# Patient Record
Sex: FEMALE | Race: WHITE | Employment: OTHER | URBAN - METROPOLITAN AREA
[De-identification: names, ages, dates, MRNs, and addresses within clinical notes are randomized per-mention and may not be internally consistent; named-entity substitution may affect disease eponyms.]

---

## 2017-01-04 ENCOUNTER — TELEPHONE (OUTPATIENT)
Dept: INTERNAL MEDICINE CLINIC | Age: 62
End: 2017-01-04

## 2017-01-04 DIAGNOSIS — E78.5 DYSLIPIDEMIA: Primary | ICD-10-CM

## 2017-01-04 DIAGNOSIS — E03.9 HYPOTHYROIDISM, UNSPECIFIED TYPE: ICD-10-CM

## 2017-01-05 NOTE — TELEPHONE ENCOUNTER
Per kenyatta kumar np labs reordered due to being  I have attempted to contact this patient by phone with the following results: left message to return my call on answering machine.

## 2017-02-12 DIAGNOSIS — J30.9 ALLERGIC RHINITIS: ICD-10-CM

## 2017-02-13 RX ORDER — FLUTICASONE PROPIONATE 50 MCG
SPRAY, SUSPENSION (ML) NASAL
Qty: 1 BOTTLE | Refills: 1 | Status: SHIPPED | OUTPATIENT
Start: 2017-02-13 | End: 2017-09-19 | Stop reason: SDUPTHER

## 2017-02-13 NOTE — TELEPHONE ENCOUNTER
2/13/2017  9:25 AM    Chief Complaint   Patient presents with    Medication Refill       Noted refill request for Flonase. Patient of Sharona Steward NP- forwarded at this time.

## 2017-02-17 ENCOUNTER — HOSPITAL ENCOUNTER (OUTPATIENT)
Dept: LAB | Age: 62
Discharge: HOME | End: 2017-02-17
Payer: COMMERCIAL

## 2017-02-17 DIAGNOSIS — E03.9 HYPOTHYROIDISM, UNSPECIFIED TYPE: ICD-10-CM

## 2017-02-17 DIAGNOSIS — E78.5 DYSLIPIDEMIA: ICD-10-CM

## 2017-02-17 LAB
ALBUMIN SERPL BCP-MCNC: 4 G/DL (ref 3.4–5)
ALBUMIN/GLOB SERPL: 1.3 {RATIO} (ref 0.8–1.7)
ALP SERPL-CCNC: 67 U/L (ref 45–117)
ALT SERPL-CCNC: 18 U/L (ref 13–56)
ANION GAP BLD CALC-SCNC: 5 MMOL/L (ref 3–18)
AST SERPL W P-5'-P-CCNC: 6 U/L (ref 15–37)
BASOPHILS # BLD AUTO: 0.1 K/UL (ref 0–0.1)
BASOPHILS # BLD: 1 % (ref 0–2)
BILIRUB SERPL-MCNC: 0.4 MG/DL (ref 0.2–1)
BUN SERPL-MCNC: 15 MG/DL (ref 7–18)
BUN/CREAT SERPL: 21 (ref 12–20)
CALCIUM SERPL-MCNC: 9.5 MG/DL (ref 8.5–10.1)
CHLORIDE SERPL-SCNC: 107 MMOL/L (ref 100–108)
CHOLEST SERPL-MCNC: 287 MG/DL
CO2 SERPL-SCNC: 30 MMOL/L (ref 21–32)
CREAT SERPL-MCNC: 0.71 MG/DL (ref 0.6–1.3)
DIFFERENTIAL METHOD BLD: NORMAL
EOSINOPHIL # BLD: 0.1 K/UL (ref 0–0.4)
EOSINOPHIL NFR BLD: 2 % (ref 0–5)
ERYTHROCYTE [DISTWIDTH] IN BLOOD BY AUTOMATED COUNT: 12.7 % (ref 11.6–14.5)
GLOBULIN SER CALC-MCNC: 3 G/DL (ref 2–4)
GLUCOSE SERPL-MCNC: 98 MG/DL (ref 74–99)
HCT VFR BLD AUTO: 44.7 % (ref 35–45)
HDLC SERPL-MCNC: 54 MG/DL (ref 40–60)
HDLC SERPL: 5.3 {RATIO} (ref 0–5)
HGB BLD-MCNC: 14.9 G/DL (ref 12–16)
LDLC SERPL CALC-MCNC: 197 MG/DL (ref 0–100)
LIPID PROFILE,FLP: ABNORMAL
LYMPHOCYTES # BLD AUTO: 36 % (ref 21–52)
LYMPHOCYTES # BLD: 2.5 K/UL (ref 0.9–3.6)
MCH RBC QN AUTO: 32.2 PG (ref 24–34)
MCHC RBC AUTO-ENTMCNC: 33.3 G/DL (ref 31–37)
MCV RBC AUTO: 96.5 FL (ref 74–97)
MONOCYTES # BLD: 0.4 K/UL (ref 0.05–1.2)
MONOCYTES NFR BLD AUTO: 6 % (ref 3–10)
NEUTS SEG # BLD: 3.7 K/UL (ref 1.8–8)
NEUTS SEG NFR BLD AUTO: 55 % (ref 40–73)
PLATELET # BLD AUTO: 257 K/UL (ref 135–420)
PMV BLD AUTO: 9.2 FL (ref 9.2–11.8)
POTASSIUM SERPL-SCNC: 4.1 MMOL/L (ref 3.5–5.5)
PROT SERPL-MCNC: 7 G/DL (ref 6.4–8.2)
RBC # BLD AUTO: 4.63 M/UL (ref 4.2–5.3)
SODIUM SERPL-SCNC: 142 MMOL/L (ref 136–145)
TRIGL SERPL-MCNC: 180 MG/DL (ref ?–150)
TSH SERPL DL<=0.05 MIU/L-ACNC: 25.2 UIU/ML (ref 0.36–3.74)
VLDLC SERPL CALC-MCNC: 36 MG/DL
WBC # BLD AUTO: 6.8 K/UL (ref 4.6–13.2)

## 2017-02-17 PROCEDURE — 84443 ASSAY THYROID STIM HORMONE: CPT | Performed by: NURSE PRACTITIONER

## 2017-02-17 PROCEDURE — 36415 COLL VENOUS BLD VENIPUNCTURE: CPT | Performed by: NURSE PRACTITIONER

## 2017-02-17 PROCEDURE — 80053 COMPREHEN METABOLIC PANEL: CPT | Performed by: NURSE PRACTITIONER

## 2017-02-17 PROCEDURE — 80061 LIPID PANEL: CPT | Performed by: NURSE PRACTITIONER

## 2017-02-17 PROCEDURE — 85025 COMPLETE CBC W/AUTO DIFF WBC: CPT | Performed by: NURSE PRACTITIONER

## 2017-02-20 DIAGNOSIS — E03.9 HYPOTHYROIDISM, UNSPECIFIED TYPE: Primary | ICD-10-CM

## 2017-02-20 DIAGNOSIS — E78.5 DYSLIPIDEMIA: ICD-10-CM

## 2017-02-20 RX ORDER — ATORVASTATIN CALCIUM 40 MG/1
40 TABLET, FILM COATED ORAL DAILY
Qty: 30 TAB | Refills: 1 | Status: SHIPPED | OUTPATIENT
Start: 2017-02-20 | End: 2017-04-17 | Stop reason: SDUPTHER

## 2017-02-21 ENCOUNTER — HOSPITAL ENCOUNTER (OUTPATIENT)
Dept: LAB | Age: 62
Discharge: HOME | End: 2017-02-21
Payer: COMMERCIAL

## 2017-02-21 DIAGNOSIS — E03.9 HYPOTHYROIDISM, UNSPECIFIED TYPE: ICD-10-CM

## 2017-02-21 DIAGNOSIS — E03.9 HYPOTHYROIDISM, UNSPECIFIED TYPE: Primary | ICD-10-CM

## 2017-02-21 LAB — TSH SERPL DL<=0.05 MIU/L-ACNC: 11.5 UIU/ML (ref 0.36–3.74)

## 2017-02-21 PROCEDURE — 84443 ASSAY THYROID STIM HORMONE: CPT | Performed by: NURSE PRACTITIONER

## 2017-02-21 PROCEDURE — 36415 COLL VENOUS BLD VENIPUNCTURE: CPT | Performed by: NURSE PRACTITIONER

## 2017-02-21 RX ORDER — LEVOTHYROXINE SODIUM 88 UG/1
88 TABLET ORAL
Qty: 30 TAB | Refills: 1 | Status: SHIPPED | OUTPATIENT
Start: 2017-02-21 | End: 2017-09-19 | Stop reason: DRUGHIGH

## 2017-03-22 ENCOUNTER — OFFICE VISIT (OUTPATIENT)
Dept: FAMILY MEDICINE CLINIC | Age: 62
End: 2017-03-22

## 2017-03-22 VITALS
HEIGHT: 65 IN | SYSTOLIC BLOOD PRESSURE: 110 MMHG | OXYGEN SATURATION: 96 % | WEIGHT: 198 LBS | BODY MASS INDEX: 32.99 KG/M2 | TEMPERATURE: 96.9 F | HEART RATE: 79 BPM | DIASTOLIC BLOOD PRESSURE: 53 MMHG | RESPIRATION RATE: 18 BRPM

## 2017-03-22 DIAGNOSIS — B34.9 VIRAL SYNDROME: Primary | ICD-10-CM

## 2017-03-22 NOTE — LETTER
NOTIFICATION RETURN TO WORK / SCHOOL 
 
3/22/2017 1:45 PM 
 
Ms. Brianna Marie 
[address] [facility] [address] To Whom It May Concern: 
 
Brianna Marie is currently under the care of [facility]. She will return to work/school on: 3/24/17 If there are questions or concerns please have the patient contact our office.  
 
 
 
Sincerely, 
 
 
MESSI Mahoney

## 2017-03-22 NOTE — PROGRESS NOTES
Patient: Pollo Gunderson MRN: [id number]  SSN: [id number]    YOB: 1955  Age: 65 y.o. Sex: female      Date of Service: 3/22/2017   Provider: MESSI Vang   Office Location:   [address] [facility], [address] [place] [address], [address]  Office Phone: [phone]  Office Fax: [phone]        REASON FOR VISIT:   Chief Complaint   Patient presents with    Sore Throat     pt states \" that the sore throat started yesterday pt states \" that pt throat feels like it is burning \"     Vomiting     pt states \" that pt had vomiting once today        VITALS:   Visit Vitals    /53    Pulse 79    Temp 96.9 °F (36.1 °C) (Oral)    Resp 18    Ht 5' 5\" (1.651 m)    Wt 198 lb (89.8 kg)    SpO2 96%    BMI 32.95 kg/m2       MEDICATIONS:   Current Outpatient Prescriptions   Medication Sig Dispense Refill    levothyroxine (SYNTHROID) 88 mcg tablet Take 1 Tab by mouth Daily (before breakfast). 30 Tab 1    atorvastatin (LIPITOR) 40 mg tablet Take 1 Tab by mouth daily. Indications: mixed hyperlipidemia 30 Tab 1    fluticasone (FLONASE) 50 mcg/actuation nasal spray USE 2 SPRAYS INTO EACH NOSTRIL EVERY DAY 1 Bottle 1    fluticasone (FLONASE) 50 mcg/actuation nasal spray 2 Sprays by Both Nostrils route daily. 1 Bottle 3    ergocalciferol (ERGOCALCIFEROL) 50,000 unit capsule Take 50,000 Units by mouth.  albuterol (PROVENTIL HFA, VENTOLIN HFA, PROAIR HFA) 90 mcg/actuation inhaler Take 2 Puffs by inhalation every four (4) hours as needed for Wheezing. 1 Inhaler 2    acetaminophen (TYLENOL) 325 mg tablet Take 500 mg by mouth every four (4) hours as needed for Pain.  valACYclovir (VALTREX) 1 gram tablet Take 1 Tab by mouth two (2) times daily as needed.  azithromycin (ZITHROMAX) 250 mg tablet Take 2 tablets today, then take 1 tablet daily 6 Tab 0    Cetirizine (ZYRTEC) 10 mg cap Take  by mouth daily.       guaiFENesin-codeine (ROBITUSSIN AC) 100-10 mg/5 mL solution Take 5 mL by mouth three (3) times daily as needed for Cough. Max Daily Amount: 15 mL. Do not drive if taking this medication 240 mL 0    montelukast (SINGULAIR) 10 mg tablet TAKE 1 TABLET BY MOUTH DAILY 30 Tab 0    ibuprofen (MOTRIN) 800 mg tablet Take 1 Tab by mouth every six (6) hours as needed for Pain. 30 Tab 1        ALLERGIES:   Allergies   Allergen Reactions    Sulfa (Sulfonamide Antibiotics) Hives        ACTIVE MEDICAL PROBLEMS:  Patient Active Problem List   Diagnosis Code    Chest pain R07.9    Dyslipidemia E78.5    Tobacco use disorder F17.200    Hypothyroidism E03.9    Asthma J45.909    Thyroid disease E07.9    Restless leg syndrome G25.81    Endometriosis N80.9    Sigmoid diverticulitis K57.32        MEDICAL/SURGICAL HISTORY:  Past Medical History:   Diagnosis Date    Asthma     childhood diagnosis; occasional inhaler use    Chest pain     Diverticulitis     Dyslipidemia     \"borderline\" per pt.  Endometriosis     GERD (gastroesophageal reflux disease)     Hypothyroidism     Normal nuclear stress test 09/14/2011    No ischemia or infarct. EF 75%. No WMA. Neg max EST w/nondiagnostic EKG changes and atypical chest discomfort at peak exercise.     Restless leg syndrome     Thyroid disease     Tobacco use disorder     ongoing      Past Surgical History:   Procedure Laterality Date    HX HYSTERECTOMY      HX HYSTEROSCOPY      HX OVARIAN CYST REMOVAL      teenager    HX TUBAL LIGATION      30 years ago        FAMILY HISTORY:  Family History   Problem Relation Age of Onset    Lung Disease Mother     Cancer Father     Cancer Sister 52     breast cancer    Asthma Sister     Asthma Brother         SOCIAL HISTORY:  Social History   Substance Use Topics    Smoking status: Current Every Day Smoker     Packs/day: 0.25     Years: 25.00    Smokeless tobacco: Never Used    Alcohol use Yes            HISTORY OF PRESENT ILLNESS:   Brianna Marie is a 65 y.o. female who presents to the office complaining of sore throat x 2 days, and vomiting x 1 day. Patient reports sudden onset fatigue and malaise yesterday, which worsened throughout the course of the day. She also developed progressively worsening sore throat, described as \"burning. \" Today, these symptoms have persisted, and she has also noted some nasal congestion and post-nasal drip. Had one episode of nausea followed by vomiting small amount of mucus earlier today while at work. She has been exposed to numerous co-workers who have been sick with similar symptoms. Patient did have a flu shot earlier this season. REVIEW OF SYSTEMS:  Review of Systems   Constitutional: Positive for chills and malaise/fatigue. Negative for fever. HENT: Positive for congestion and sore throat. Eyes: Positive for redness ( right eye). Negative for discharge. Respiratory: Positive for cough. Negative for sputum production, shortness of breath and wheezing. Cardiovascular: Negative for chest pain and palpitations. Gastrointestinal: Positive for nausea and vomiting. Negative for abdominal pain and diarrhea. Neurological: Negative for headaches. PHYSICAL EXAMINATION:  Physical Exam   Constitutional: She is well-developed, well-nourished, and in no distress. HENT:   Head: Normocephalic and atraumatic. Right Ear: External ear normal.   Left Ear: External ear normal.   Nose: Mucosal edema and rhinorrhea present. Mouth/Throat: Uvula is midline. Posterior oropharyngeal erythema present. No oropharyngeal exudate or posterior oropharyngeal edema. Eyes: Conjunctivae are normal.   Neck: Neck supple. Cardiovascular: Normal rate, regular rhythm and normal heart sounds. Exam reveals no gallop and no friction rub. No murmur heard. Pulmonary/Chest: Effort normal and breath sounds normal. She has no wheezes. She has no rales. Abdominal: Soft. Bowel sounds are normal. There is no tenderness. There is no rebound and no guarding. Lymphadenopathy:     She has cervical adenopathy. RESULTS:  No results found for this visit on 03/22/17. ASSESSMENT/PLAN:  Bharati Stout was seen today for sore throat and vomiting. Diagnoses and all orders for this visit:    Viral syndrome  - Suspect viral illness  - Counseled on supportive care with fluids, rest, OTC meds as needed  - Out of work note provided    Follow up PRN new/worsening symptoms    Patient expresses understanding and is agreeable with the above plan.       PATIENT CARE TEAM:   Patient Care Team:  Roby Vieira NP as PCP - General (Nurse Practitioner)  Mg Bonilla RN as Nurse [place], [place]   March 22, 2017    3:29 PM

## 2017-04-03 ENCOUNTER — OFFICE VISIT (OUTPATIENT)
Dept: ORTHOPEDIC SURGERY | Age: 62
End: 2017-04-03

## 2017-04-03 VITALS
TEMPERATURE: 95.5 F | DIASTOLIC BLOOD PRESSURE: 71 MMHG | SYSTOLIC BLOOD PRESSURE: 135 MMHG | HEART RATE: 76 BPM | BODY MASS INDEX: 32.96 KG/M2 | HEIGHT: 65 IN | WEIGHT: 197.8 LBS

## 2017-04-03 DIAGNOSIS — M79.672 BILATERAL FOOT PAIN: ICD-10-CM

## 2017-04-03 DIAGNOSIS — L60.0 INGROWN TOENAIL: Primary | ICD-10-CM

## 2017-04-03 DIAGNOSIS — M79.671 BILATERAL FOOT PAIN: ICD-10-CM

## 2017-04-03 NOTE — PROGRESS NOTES
AMBULATORY PROGRESS NOTE      Patient: Tiffanie Rivero             MRN: [id number]     SSN: [id number] Body mass index is 32.92 kg/(m^2). YOB: 1955     AGE: 65 y.o. EX: female    PCP: Vannessa Trivedi NP    IMPRESSION/DIAGNOSIS AND TREATMENT PLAN     DIAGNOSES  1. Ingrown toenail    2. Bilateral foot pain        Orders Placed This Encounter    [97576] Foot Min 3V    [79813] Foot Min 3V      Brianna Marie understands her diagnoses and the proposed plan. My plan for her is going to be a right great toenail partial excision, medial and lateral aspects of her right great toe:  Nail plate removal, partial, medial and lateral aspects with nail matrix excision. The goal of surgery is to prevent the nail plate from regrowing on the corners of the right great toe. Time of Surgery:  I anticipate 45 minutes. Lana 1 outpatient. She will call Corinne Burns to get things scheduled. Plan:    1) Medical release forms from prior procedure  2) Referral to Corinne Burns for surgical scheduling    RTO - Pre-op appointment    HPI AND EXAMINATION     Brianna Marie IS A 65 y.o. female who presents to my outpatient office complaining of: bilateral foot and great toe pain. Patient states that her pain is localized on the lateral aspect of her right great toe. Specifically the lateral nail fold. Patient notes a previous procedure where her right great toe ingrown toenail was \"dug out\". She associates her current problems with that procedure. The Zadik procedure was discussed with the patient. She would like to move forward with the procedure bilaterally. 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 Brianna Marie is alert/oriented (name, location, time) and follows commands well. she  is in no acute distress and her affect and mood are appropriate.       Bilateral ANKLE and FOOT       Gait: normal  Tenderness: moderate tenderness to the lateral aspect of the right first toe  Cutaneous: No rashes, skin patches, wounds, or abrasions to the lower legs           Warm and Normal color. No regions of expressible drainage. Medial Border of Tibia Region: absent           Skin color, texture, turgor normal. Normal.    Right foot: Hypertrophic first toe nail plate with fungal infection. Joint Motion: ROM Ankle: Normal , Hindfoot: (ST,TN,CC) Normal, Forefoot toes: Normal  Neurologic Exam: Neuro: Motor: normal 5/5 strength in all tested muscle groups and Sensory: no sensory deficits noted. Contractures: Gastrocnemius or Achilles Contractures absent  Joint / Tendon Stability: No Ankle or Subtalar instability or joint laxity. No peroneal sublux ability or dislocation  Alignment:  Normal Foot Alignment and  Flexible  Vascular: Normal Pulses/ NL Capillary refill, No evidence of DVT seen on physical exam.   No calf swelling, no tenderness to calf muscles. Lymphatic:  No Evidence of Lymphedema. CHART REVIEW     Past Medical History:   Diagnosis Date    Asthma     childhood diagnosis; occasional inhaler use    Chest pain     Diverticulitis     Dyslipidemia     \"borderline\" per pt.  Endometriosis     GERD (gastroesophageal reflux disease)     Hypothyroidism     Normal nuclear stress test 09/14/2011    No ischemia or infarct. EF 75%. No WMA. Neg max EST w/nondiagnostic EKG changes and atypical chest discomfort at peak exercise.  Restless leg syndrome     Thyroid disease     Tobacco use disorder     ongoing     Current Outpatient Prescriptions   Medication Sig    levothyroxine (SYNTHROID) 88 mcg tablet Take 1 Tab by mouth Daily (before breakfast).  atorvastatin (LIPITOR) 40 mg tablet Take 1 Tab by mouth daily. Indications: mixed hyperlipidemia    fluticasone (FLONASE) 50 mcg/actuation nasal spray 2 Sprays by Both Nostrils route daily.  Cetirizine (ZYRTEC) 10 mg cap Take  by mouth daily.     ergocalciferol (ERGOCALCIFEROL) 50,000 unit capsule Take 50,000 Units by mouth.  montelukast (SINGULAIR) 10 mg tablet TAKE 1 TABLET BY MOUTH DAILY    acetaminophen (TYLENOL) 325 mg tablet Take 500 mg by mouth every four (4) hours as needed for Pain.  ibuprofen (MOTRIN) 800 mg tablet Take 1 Tab by mouth every six (6) hours as needed for Pain.  fluticasone (FLONASE) 50 mcg/actuation nasal spray USE 2 SPRAYS INTO EACH NOSTRIL EVERY DAY    valACYclovir (VALTREX) 1 gram tablet Take 1 Tab by mouth two (2) times daily as needed.  azithromycin (ZITHROMAX) 250 mg tablet Take 2 tablets today, then take 1 tablet daily    guaiFENesin-codeine (ROBITUSSIN AC) 100-10 mg/5 mL solution Take 5 mL by mouth three (3) times daily as needed for Cough. Max Daily Amount: 15 mL. Do not drive if taking this medication    albuterol (PROVENTIL HFA, VENTOLIN HFA, PROAIR HFA) 90 mcg/actuation inhaler Take 2 Puffs by inhalation every four (4) hours as needed for Wheezing. No current facility-administered medications for this visit. Allergies   Allergen Reactions    Sulfa (Sulfonamide Antibiotics) Hives     Past Surgical History:   Procedure Laterality Date    HX HYSTERECTOMY      HX HYSTEROSCOPY      HX OVARIAN CYST REMOVAL      teenager    HX TUBAL LIGATION      30 years ago     Social History     Occupational History    Not on file. Social History Main Topics    Smoking status: Current Every Day Smoker     Packs/day: 0.25     Years: 25.00    Smokeless tobacco: Never Used    Alcohol use Yes    Drug use: No    Sexual activity: Yes     Partners: Male     Birth control/ protection: None     Family History   Problem Relation Age of Onset    Lung Disease Mother     Cancer Father     Cancer Sister 52     breast cancer    Asthma Sister     Asthma Brother        REVIEW OF SYSTEMS : 4/3/2017  ALL BELOW ARE Negative except : SEE HPI       Constitutional: Negative for fever, chills and weight loss.  Neg Weigh Loss  Cardiovascular: Negative for chest pain, claudication and leg swelling. SOB, TSAI   Gastrointestinal: Negative for  pain, N/V/D/C, Blood in stool or urine,dysuria, hematuria,        Incontinence, pelvic pain  Musculoskeletal: see HPI. Neurological: Negative for dizziness and weakness. Negative for headaches,Visual Changes, Confusion, Seizures,   Psychiatric/Behavioral: Negative for depression, memory loss and substance abuse. Extremities:  Negative for  hair changes, rash or skin lesion changes. Hematologic: Negative for Bleeding problems, bruising, pallor or swollen lymph nodes. Peripheral Vascular: No calf pain, vascular vein tenderness to calf pain              No calf throbbing, posterior knee throbbing pain    DIAGNOSTIC IMAGING     FOOT X RAYS 3 VIEWS Bilateral   4/3/2017    NON WEIGHTBEARING X-RAYS     X RAYS AT [address] [place] [address]  4/3/2017      Bones: No fractures or dislocations. No focal osteolytic or osteoblastic process     Bone Spurs: No significant bone spurs  Alignment foot: WNL   Joint Condition: No Significant OA  Soft Tissues: Normal, No radiopaque foreign body and No abnormal calcific densities to soft tissues   No ankle joint effusion in lateral projection. Mineralization: Suggests  no Osteopenia    I have personally reviewed the results of the above study. The interpretation of this study is my professional opinion    Written by Kallie Rivas, as dictated by Mary Anderson MD. I, DrLeticia, Mary Anderson MD, confirm that all documentation is accurate.

## 2017-04-03 NOTE — MR AVS SNAPSHOT
Visit Information Date & Time Provider Department Dept. Phone Encounter #  
 4/3/2017  8:30 AM Pete Ramsayr, [address] [facility] [address] [facility] [phone] [id number] Upcoming Health Maintenance Date Due Hepatitis C Screening 1955 Pneumococcal 19-64 Medium Risk (1 of 1 - PPSV23) 2/5/1975 DTaP/Tdap/Td series (1 - Tdap) 2/5/1977 PAP AKA CERVICAL CYTOLOGY 2/5/1977 ZOSTER VACCINE AGE 60> 2/5/2016 BREAST CANCER SCRN MAMMOGRAM 12/28/2017 COLONOSCOPY 11/28/2021 Allergies as of 4/3/2017  Review Complete On: 4/3/2017 By: Bridgett Alston Severity Noted Reaction Type Reactions Sulfa (Sulfonamide Antibiotics)  09/21/2011    Hives Current Immunizations  Reviewed on 10/31/2016 Name Date Influenza Vaccine 9/1/2016 Not reviewed this visit You Were Diagnosed With   
  
 Codes Comments Bilateral foot pain    -  Primary ICD-10-CM: M79.671, S70.798 ICD-9-CM: 729.5 Vitals BP Pulse Temp Height(growth percentile) Weight(growth percentile) BMI  
 135/71 76 95.5 °F (35.3 °C) (Oral) 5' 5\" (1.651 m) 197 lb 12.8 oz (89.7 kg) 32.92 kg/m2 OB Status Smoking Status Hysterectomy Current Every Day Smoker BMI and BSA Data Body Mass Index Body Surface Area  
 32.92 kg/m 2 2.03 m 2 Preferred Pharmacy Pharmacy Name Phone [address], [address] [place] [address] [facility] [phone] Your Updated Medication List  
  
   
This list is accurate as of: 4/3/17  9:20 AM.  Always use your most recent med list.  
  
  
  
  
 albuterol 90 mcg/actuation inhaler Commonly known as:  PROVENTIL HFA, VENTOLIN HFA, PROAIR HFA Take 2 Puffs by inhalation every four (4) hours as needed for Wheezing. atorvastatin 40 mg tablet Commonly known as:  LIPITOR Take 1 Tab by mouth daily. Indications: mixed hyperlipidemia  
  
 azithromycin 250 mg tablet Commonly known as:  Nola Reina Take 2 tablets today, then take 1 tablet daily  
  
 ergocalciferol 50,000 unit capsule Commonly known as:  ERGOCALCIFEROL Take 50,000 Units by mouth. * fluticasone 50 mcg/actuation nasal spray Commonly known as:  Jose Noss 2 Sprays by Both Nostrils route daily. * fluticasone 50 mcg/actuation nasal spray Commonly known as:  FLONASE  
USE 2 SPRAYS INTO EACH NOSTRIL EVERY DAY  
  
 guaiFENesin-codeine 100-10 mg/5 mL solution Commonly known as:  ROBITUSSIN AC Take 5 mL by mouth three (3) times daily as needed for Cough. Max Daily Amount: 15 mL. Do not drive if taking this medication  
  
 ibuprofen 800 mg tablet Commonly known as:  MOTRIN Take 1 Tab by mouth every six (6) hours as needed for Pain.  
  
 levothyroxine 88 mcg tablet Commonly known as:  SYNTHROID Take 1 Tab by mouth Daily (before breakfast). montelukast 10 mg tablet Commonly known as:  SINGULAIR  
TAKE 1 TABLET BY MOUTH DAILY  
  
 TYLENOL 325 mg tablet Generic drug:  acetaminophen Take 500 mg by mouth every four (4) hours as needed for Pain. valACYclovir 1 gram tablet Commonly known as:  VALTREX Take 1 Tab by mouth two (2) times daily as needed. ZyrTEC 10 mg Cap Generic drug:  Cetirizine Take  by mouth daily. * Notice: This list has 2 medication(s) that are the same as other medications prescribed for you. Read the directions carefully, and ask your doctor or other care provider to review them with you. We Performed the Following AMB POC XRAY, FOOT; COMPLETE, 3+ VIEW [28659 CPT(R)] AMB POC XRAY, FOOT; COMPLETE, 3+ VIEW [81721 CPT(R)] Patient Instructions Please follow up with Otoniel Caruso for surgical scheduling. You are advised to contact us if your condition worsens. Ingrown Toenail: Care Instructions Your Care Instructions An ingrown toenail often occurs because a nail is not trimmed correctly or because shoes are too tight. An ingrown nail can cause an infection. If your toe is infected, your doctor may prescribe antibiotics. Most ingrown toenails can be treated at home. You should trim toenails straight across, so the ends of the nail grow over the skin and not into it. Good nail care can prevent ingrown toenails. Follow-up care is a key part of your treatment and safety. Be sure to make and go to all appointments, and call your doctor if you are having problems. It's also a good idea to know your test results and keep a list of the medicines you take. How can you care for yourself at home? · Trim the nails straight across. Leave the corners a little longer so they do not cut into the skin. To do this when you have an ingrown nail: 
¨ Soak your foot in warm water for about 15 minutes to soften the nail. ¨ Wedge a small piece of wet cotton under the corner of the nail to cushion the nail and lift it slightly. This keeps it from cutting the skin. ¨ Repeat daily until the nail has grown out and can be trimmed. · Do not use manicure scissors to dig under the ingrown nail. You might stab your toe, which could get infected. · Do not trim your toenails too short. · Check with your doctor before trimming your own toenails if you have been diagnosed with diabetes or peripheral arterial disease. These conditions increase the risk of an infection, because you may have decreased sensation in your toes and cut yourself without knowing it. · Wear roomy, comfortable shoes. · If your doctor prescribed antibiotics, take them as directed. Do not stop taking them just because you feel better. You need to take the full course of antibiotics. When should you call for help? Call your doctor now or seek immediate medical care if: 
· You have signs of infection, such as: 
¨ Increased pain, swelling, warmth, or redness. ¨ Red streaks leading from the toe. ¨ Pus draining from the toe. ¨ A fever. Watch closely for changes in your health, and be sure to contact your doctor if: 
· You have problems trimming your nails. · You do not get better as expected. Where can you learn more? Go to http://michael-jessi.info/. Enter R135 in the search box to learn more about \"Ingrown Toenail: Care Instructions. \" Current as of: October 13, 2016 Content Version: 11.2 © 2093-0828 [employer]. Care instructions adapted under license by [employer] (which disclaims liability or warranty for this information). If you have questions about a medical condition or this instruction, always ask your healthcare professional. [address] any warranty or liability for your use of this information. Introducing [facility]! Jo Ann Greer introduces [employer] patient portal. Now you can access parts of your medical record, email your doctor's office, and request medication refills online. 1. In your internet browser, go to [email or web address] 2. Click on the First Time User? Click Here link in the Sign In box. You will see the New Member Sign Up page. 3. Enter your [employer] Access Code exactly as it appears below. You will not need to use this code after youve completed the sign-up process. If you do not sign up before the expiration date, you must request a new code. · [employer] Access Code: [email or web address] Expires: 5/18/2017  7:55 AM 
 
4. Enter the last four digits of your Social Security Number (xxxx) and Date of Birth (mm/dd/yyyy) as indicated and click Submit. You will be taken to the next sign-up page. 5. Create a [employer] ID. This will be your [employer] login ID and cannot be changed, so think of one that is secure and easy to remember. 6. Create a [employer] password. You can change your password at any time. 7. Enter your Password Reset Question and Answer. This can be used at a later time if you forget your password. 8. Enter your e-mail address. You will receive e-mail notification when new information is available in [address] 9. Click Sign Up. You can now view and download portions of your medical record. 10. Click the Download Summary menu link to download a portable copy of your medical information. If you have questions, please visit the Frequently Asked Questions section of the [employer] website. Remember, [employer] is NOT to be used for urgent needs. For medical emergencies, dial 911. Now available from your iPhone and Android! Please provide this summary of care documentation to your next provider. Your primary care clinician is listed as ALTHEA HURTADO. If you have any questions after today's visit, please call [phone].

## 2017-04-03 NOTE — PROGRESS NOTES
My surgical plan for her would be a left great toenail plate partial excision, medial and lateral aspects. The goal is to remove the nail plate on the medial and lateral aspects to help prevent regrowing. This will be a Zadik procedure, partial removal of nail plate of the left great toe, medial and lateral aspects with partial matrix excision lateral and medial aspects of the left great toe. We will get things scheduled for her at her convenience. She will call Abigail Moore. Once we get a date, we will start our standard preop on her. I anticipate [facility], outpatient. Time of surgery:  45 minutes.

## 2017-04-03 NOTE — PATIENT INSTRUCTIONS
Please follow up with Lizzette Dumont for surgical scheduling. You are advised to contact us if your condition worsens. Ingrown Toenail: Care Instructions  Your Care Instructions    An ingrown toenail often occurs because a nail is not trimmed correctly or because shoes are too tight. An ingrown nail can cause an infection. If your toe is infected, your doctor may prescribe antibiotics. Most ingrown toenails can be treated at home. You should trim toenails straight across, so the ends of the nail grow over the skin and not into it. Good nail care can prevent ingrown toenails. Follow-up care is a key part of your treatment and safety. Be sure to make and go to all appointments, and call your doctor if you are having problems. It's also a good idea to know your test results and keep a list of the medicines you take. How can you care for yourself at home? · Trim the nails straight across. Leave the corners a little longer so they do not cut into the skin. To do this when you have an ingrown nail:  ¨ Soak your foot in warm water for about 15 minutes to soften the nail. ¨ Wedge a small piece of wet cotton under the corner of the nail to cushion the nail and lift it slightly. This keeps it from cutting the skin. ¨ Repeat daily until the nail has grown out and can be trimmed. · Do not use manicure scissors to dig under the ingrown nail. You might stab your toe, which could get infected. · Do not trim your toenails too short. · Check with your doctor before trimming your own toenails if you have been diagnosed with diabetes or peripheral arterial disease. These conditions increase the risk of an infection, because you may have decreased sensation in your toes and cut yourself without knowing it. · Wear roomy, comfortable shoes. · If your doctor prescribed antibiotics, take them as directed. Do not stop taking them just because you feel better. You need to take the full course of antibiotics.   When should you call for help? Call your doctor now or seek immediate medical care if:  · You have signs of infection, such as:  ¨ Increased pain, swelling, warmth, or redness. ¨ Red streaks leading from the toe. ¨ Pus draining from the toe. ¨ A fever. Watch closely for changes in your health, and be sure to contact your doctor if:  · You have problems trimming your nails. · You do not get better as expected. Where can you learn more? Go to http://michael-jessi.info/. Enter R135 in the search box to learn more about \"Ingrown Toenail: Care Instructions. \"  Current as of: October 13, 2016  Content Version: 11.2  © 2751-2638 [employer]. Care instructions adapted under license by [employer] (which disclaims liability or warranty for this information). If you have questions about a medical condition or this instruction, always ask your healthcare professional. [address] any warranty or liability for your use of this information.

## 2017-04-03 NOTE — PROCEDURES
FOOT X RAYS 3 VIEWS Bilateral   4/3/2017    NON WEIGHTBEARING X-RAYS     X RAYS AT [address] [place] [address]  4/3/2017      Bones: No fractures or dislocations. No focal osteolytic or osteoblastic process     Bone Spurs: No significant bone spurs  Alignment foot: WNL   Joint Condition: No Significant OA  Soft Tissues: Normal, No radiopaque foreign body and No abnormal calcific densities to soft tissues   No ankle joint effusion in lateral projection. Mineralization: Suggests  no Osteopenia    I have personally reviewed the results of the above study.  The interpretation of this study is my professional opinion

## 2017-04-10 ENCOUNTER — HOSPITAL ENCOUNTER (OUTPATIENT)
Dept: LAB | Age: 62
Discharge: HOME | End: 2017-04-10
Payer: COMMERCIAL

## 2017-04-10 LAB
25(OH)D3 SERPL-MCNC: 39.2 NG/ML (ref 30–100)
T4 FREE SERPL-MCNC: 1.3 NG/DL (ref 0.7–1.5)
TSH SERPL DL<=0.05 MIU/L-ACNC: 1.22 UIU/ML (ref 0.36–3.74)

## 2017-04-10 PROCEDURE — 36415 COLL VENOUS BLD VENIPUNCTURE: CPT | Performed by: INTERNAL MEDICINE

## 2017-04-10 PROCEDURE — 84439 ASSAY OF FREE THYROXINE: CPT | Performed by: INTERNAL MEDICINE

## 2017-04-10 PROCEDURE — 82306 VITAMIN D 25 HYDROXY: CPT | Performed by: INTERNAL MEDICINE

## 2017-04-10 PROCEDURE — 84443 ASSAY THYROID STIM HORMONE: CPT | Performed by: INTERNAL MEDICINE

## 2017-04-18 RX ORDER — ATORVASTATIN CALCIUM 40 MG/1
TABLET, FILM COATED ORAL
Qty: 30 TAB | Refills: 0 | Status: SHIPPED | OUTPATIENT
Start: 2017-04-18 | End: 2017-05-16 | Stop reason: SDUPTHER

## 2017-05-17 RX ORDER — ATORVASTATIN CALCIUM 40 MG/1
TABLET, FILM COATED ORAL
Qty: 30 TAB | Refills: 0 | Status: SHIPPED | OUTPATIENT
Start: 2017-05-17 | End: 2017-07-24 | Stop reason: SDUPTHER

## 2017-05-23 ENCOUNTER — OFFICE VISIT (OUTPATIENT)
Dept: ORTHOPEDIC SURGERY | Age: 62
End: 2017-05-23

## 2017-05-23 VITALS
HEART RATE: 73 BPM | WEIGHT: 201 LBS | SYSTOLIC BLOOD PRESSURE: 118 MMHG | BODY MASS INDEX: 33.49 KG/M2 | HEIGHT: 65 IN | DIASTOLIC BLOOD PRESSURE: 75 MMHG | TEMPERATURE: 98.2 F

## 2017-05-23 DIAGNOSIS — Z01.818 PRE-OPERATIVE EXAMINATION: ICD-10-CM

## 2017-05-23 DIAGNOSIS — L60.0 INGROWN TOENAIL: ICD-10-CM

## 2017-05-23 DIAGNOSIS — M79.671 BILATERAL FOOT PAIN: Primary | ICD-10-CM

## 2017-05-23 DIAGNOSIS — M79.672 BILATERAL FOOT PAIN: Primary | ICD-10-CM

## 2017-05-23 RX ORDER — POLYETHYLENE GLYCOL 3350 17 G/17G
17 POWDER, FOR SOLUTION ORAL DAILY
Qty: 10 PACKET | Refills: 1 | Status: SHIPPED | OUTPATIENT
Start: 2017-05-23 | End: 2017-09-19 | Stop reason: ALTCHOICE

## 2017-05-23 RX ORDER — ACETAMINOPHEN AND CODEINE PHOSPHATE 300; 30 MG/1; MG/1
1 TABLET ORAL
Qty: 40 TAB | Refills: 0 | Status: SHIPPED | OUTPATIENT
Start: 2017-05-23 | End: 2017-09-19

## 2017-05-23 RX ORDER — PROMETHAZINE HYDROCHLORIDE 25 MG/1
25 TABLET ORAL
Qty: 30 TAB | Refills: 0 | Status: SHIPPED | OUTPATIENT
Start: 2017-05-23 | End: 2017-09-19 | Stop reason: ALTCHOICE

## 2017-05-23 NOTE — MR AVS SNAPSHOT
Visit Information Date & Time Provider Department Dept. Phone Encounter #  
 5/23/2017  3:30 PM Davis Roca, [address] [facility] [id number] Follow-up Instructions Return in about 8 days (around 5/31/2017) for post surgical evaluation. Your Appointments 6/14/2017  1:30 PM  
POST OP with Davis Roca PA-C  
[address] [facility], [address] [facility] ([facility]) Appt Note: Excision nail plate Rt & Lt great toe [address], [address] [facility]  
[phone] [address] [facility], [address] Upcoming Health Maintenance Date Due Hepatitis C Screening 1955 Pneumococcal 19-64 Medium Risk (1 of 1 - PPSV23) 2/5/1975 DTaP/Tdap/Td series (1 - Tdap) 2/5/1977 PAP AKA CERVICAL CYTOLOGY 2/5/1977 ZOSTER VACCINE AGE 60> 2/5/2016 INFLUENZA AGE 9 TO ADULT 8/1/2017 BREAST CANCER SCRN MAMMOGRAM 12/28/2017 COLONOSCOPY 11/28/2021 Allergies as of 5/23/2017  Review Complete On: 5/23/2017 By: Keenan Engle Severity Noted Reaction Type Reactions Sulfa (Sulfonamide Antibiotics)  09/21/2011    Hives Current Immunizations  Reviewed on 10/31/2016 Name Date Influenza Vaccine 9/1/2016 Not reviewed this visit You Were Diagnosed With   
  
 Codes Comments Bilateral foot pain    -  Primary ICD-10-CM: M79.671, N75.326 ICD-9-CM: 729.5 Ingrown toenail     ICD-10-CM: L60.0 ICD-9-CM: 703.0 Pre-operative examination     ICD-10-CM: S59.226 ICD-9-CM: V72.84 Vitals BP Pulse Temp Height(growth percentile) Weight(growth percentile) BMI  
 118/75 73 98.2 °F (36.8 °C) (Oral) 5' 5\" (1.651 m) 201 lb (91.2 kg) 33.45 kg/m2 OB Status Smoking Status Hysterectomy Current Every Day Smoker BMI and BSA Data  Body Mass Index Body Surface Area  
 33.45 kg/m 2 2.05 m 2  
  
  
 Preferred Pharmacy Pharmacy Name Phone [address], [address] [facility] [phone] Your Updated Medication List  
  
   
This list is accurate as of: 5/23/17  4:24 PM.  Always use your most recent med list.  
  
  
  
  
 acetaminophen-codeine 300-30 mg per tablet Commonly known as:  TYLENOL-CODEINE #3 Take 1 Tab by mouth every four (4) hours as needed for Pain. albuterol 90 mcg/actuation inhaler Commonly known as:  PROVENTIL HFA, VENTOLIN HFA, PROAIR HFA Take 2 Puffs by inhalation every four (4) hours as needed for Wheezing. atorvastatin 40 mg tablet Commonly known as:  LIPITOR  
TAKE 1 TABLET BY MOUTH EVERY DAY  
  
 azithromycin 250 mg tablet Commonly known as:  Nieves Clayton Take 2 tablets today, then take 1 tablet daily  
  
 ergocalciferol 50,000 unit capsule Commonly known as:  ERGOCALCIFEROL Take 50,000 Units by mouth. * fluticasone 50 mcg/actuation nasal spray Commonly known as:  Sherrie Carr 2 Sprays by Both Nostrils route daily. * fluticasone 50 mcg/actuation nasal spray Commonly known as:  FLONASE  
USE 2 SPRAYS INTO EACH NOSTRIL EVERY DAY  
  
 guaiFENesin-codeine 100-10 mg/5 mL solution Commonly known as:  ROBITUSSIN AC Take 5 mL by mouth three (3) times daily as needed for Cough. Max Daily Amount: 15 mL. Do not drive if taking this medication  
  
 ibuprofen 800 mg tablet Commonly known as:  MOTRIN Take 1 Tab by mouth every six (6) hours as needed for Pain.  
  
 levothyroxine 88 mcg tablet Commonly known as:  SYNTHROID Take 1 Tab by mouth Daily (before breakfast). montelukast 10 mg tablet Commonly known as:  SINGULAIR  
TAKE 1 TABLET BY MOUTH DAILY polyethylene glycol 17 gram packet Commonly known as:  Carol Limb Take 1 Packet by mouth daily. promethazine 25 mg tablet Commonly known as:  PHENERGAN  
 Take 1 Tab by mouth every six (6) hours as needed for Nausea. TYLENOL 325 mg tablet Generic drug:  acetaminophen Take 500 mg by mouth every four (4) hours as needed for Pain. valACYclovir 1 gram tablet Commonly known as:  VALTREX Take 1 Tab by mouth two (2) times daily as needed. ZyrTEC 10 mg Cap Generic drug:  Cetirizine Take  by mouth daily. * Notice: This list has 2 medication(s) that are the same as other medications prescribed for you. Read the directions carefully, and ask your doctor or other care provider to review them with you. Prescriptions Printed Refills  
 acetaminophen-codeine (TYLENOL-CODEINE #3) 300-30 mg per tablet 0 Sig: Take 1 Tab by mouth every four (4) hours as needed for Pain. Class: Print Route: Oral  
 promethazine (PHENERGAN) 25 mg tablet 0 Sig: Take 1 Tab by mouth every six (6) hours as needed for Nausea. Class: Print Route: Oral  
 polyethylene glycol (MIRALAX) 17 gram packet 1 Sig: Take 1 Packet by mouth daily. Class: Print Route: Oral  
  
We Performed the Following AMB SUPPLY ORDER [[id number] Custom] Comments:  
 2017 
4:22 PM 
 
Charles Chow Post op shoe Follow-up Instructions Return in about 8 days (around 2017) for post surgical evaluation. Patient Instructions Dr. Peg An Pre-operative Instructions: 
 
 
Patient: Vince Leger :  1955 I understand I am to stop taking all Aspirin, Aspirin containing medications, Non-Steroidal Anti-Inflammatory medications (such as Advil, Aleve, Motrin, Ibuprofen) and or Blood thinner medication such as Coumadin, Plavix, Heparin or others 5 days prior to surgery. I understand I am to STOP taking these Medications 5 days prior to surgery: 
I am to get instructions from my prescribing physician. 1. __As listed above_______________________ 2. _____________________________________ 3. _____________________________________ 4. _____________________________________ I understand that if I am taking daily medications for high blood pressure, I can take them the morning of surgery with a small sip of water. I will consult my prescribing physician or call [facility] with specific questions. I also understand that: ? I am to report important observations or changes that may occur prior to surgery. If I have any changes in my physical condition, such as a rash, a fever, sore throat, abscess, ulcers, nausea, vomiting, or diarrhea. I am to call the office and I am to consult my primary care physician to assess and treat the problem. ? I am not to eat or drink anything after midnight the night before my surgery. ? I am not to drink alcoholic beverages 24 hours prior to surgery. ? I am not to do any illegal drugs prior to surgery. ? I am not to smoke at least 24 hours prior to surgery. ? I am able and will shower or bathe before surgery. I will use the Hibiclens solution on my surgical site only. The hibiclens directions are one packet a day starting two days before surgery. ? I will remove any nail polish, make-up or jewelry prior to arriving for my surgery. ? If I wear glasses, contact lenses or dentures they must be removed prior to going to the operating room. ? All body piercing and artifical eye-lashes must be removed prior to surgery ? I will not wear any aerosol sprays, perfumes or skin creams. ? I am to make arrangements for a family member or friend to accompany me to surgery and take me home after my surgery as I will not be allowed to leave the hospital alone. A cab or bus will not be acceptable. Please make arrange for someone to stay with you for 24 hours after surgery. ? Patient has expressed understanding of the diagnosis, treatment and planned surgery Surgery: What to Expect at [facility] Your Recovery This care sheet gives you a general idea about how long it will take for you to recover from your surgery. But each person recovers at a different pace. How can you care for yourself at home? Activity · Allow your body to heal. Don't move quickly or lift anything heavy until you are feeling better. · Rest when you feel tired. · Your doctor may give you specific instructions on when you can do your normal activities again, such as driving and going back to work. · Be active. Walking is a good choice. Diet · You can eat your normal diet when you feel well. If your stomach is upset, try bland, low-fat foods like plain rice, broiled chicken, toast, and yogurt. · If your bowel movements are not regular right after surgery, try to avoid constipation and straining. Drink plenty of water. Your doctor may suggest fiber, a stool softener, or a mild laxative. Medicines · Your doctor will tell you if and when you can restart your medicines. He or she will also give you instructions about taking any new medicines. · If you take blood thinners, such as warfarin (Coumadin), clopidogrel (Plavix), or aspirin, be sure to talk to your doctor. He or she will tell you if and when to start taking those medicines again. Make sure that you understand exactly what your doctor wants you to do. · Be safe with medicines. Read and follow all instructions on the label. ¨ If the doctor gave you a prescription medicine for pain, take it as prescribed. ¨ If you are not taking a prescription pain medicine, ask your doctor if you can take an over-the-counter medicine. Incision care · You will have a dressing over the cut (incision). A dressing helps the incision heal and protects it. Your doctor will tell you how to take care of this. · If you have strips of tape on the cut the doctor made, leave the tape on for a week or until it falls off. · If you had stitches, your doctor will tell you when to come back to have them removed. · If you have skin adhesive on the cut, leave it on until it falls off. Skin adhesive is also called liquid stitches. · Change the bandage every day. · Wash the area daily with warm, soapy water, and pat it dry. Don't use hydrogen peroxide or alcohol. They can slow healing. · You may cover the area with a gauze bandage if it oozes fluid or rubs against clothing. · You may shower 24 to 48 hours after surgery. Pat the incision dry. Don't swim or take a bath for the first 2 weeks, or until your doctor tells you it is okay. Follow-up care is a key part of your treatment and safety. Be sure to make and go to all appointments, and call your doctor if you are having problems. It's also a good idea to know your test results and keep a list of the medicines you take. When should you call for help? Call 911 anytime you think you may need emergency care. For example, call if: 
· You passed out (lost consciousness). · You have severe trouble breathing. · You have sudden chest pain and shortness of breath, or you cough up blood. Call your doctor now or seek immediate medical care if: 
· You have pain that does not get better after you take pain medicine. · You have loose stitches, or your incision comes open. · You are bleeding through your dressing. A small amount of blood is normal. 
· You have signs of infection, such as: 
¨ Increased pain, swelling, warmth, or redness. ¨ Red streaks leading from the incision. ¨ Pus draining from the incision. ¨ A fever. · You have symptoms of a blood clot in your arm or leg (called a deep vein thrombosis). These may include: 
¨ Pain in your calf, back of the knee, thigh, or groin. ¨ Redness and swelling in the arm, leg, or groin. Watch closely for any changes in your health, and be sure to contact your doctor if: 
· You do not have a bowel movement after taking a laxative. Where can you learn more? Go to http://michael-jessi.info/ Enter W539 in the search box to learn more about \"Surgery: What to Expect at Home. \" 
© 9586-8779 [employer]. Care instructions adapted under license by [employer] (which disclaims liability or warranty for this information). This care instruction is for use with your licensed healthcare professional. If you have questions about a medical condition or this instruction, always ask your healthcare professional. [address] any warranty or liability for your use of this information. Content Version: 77.0.535211; Current as of: November 20, 2015 Introducing [facility]! Priti Stovall introduces [employer] patient portal. Now you can access parts of your medical record, email your doctor's office, and request medication refills online. 1. In your internet browser, go to [email or web address] 2. Click on the First Time User? Click Here link in the Sign In box. You will see the New Member Sign Up page. 3. Enter your [employer] Access Code exactly as it appears below. You will not need to use this code after youve completed the sign-up process. If you do not sign up before the expiration date, you must request a new code. · [employer] Access Code: [email or web address] Expires: 8/21/2017  3:26 PM 
 
4. Enter the last four digits of your Social Security Number (xxxx) and Date of Birth (mm/dd/yyyy) as indicated and click Submit. You will be taken to the next sign-up page. 5. Create a [employer] ID. This will be your [employer] login ID and cannot be changed, so think of one that is secure and easy to remember. 6. Create a [employer] password. You can change your password at any time. 7. Enter your Password Reset Question and Answer. This can be used at a later time if you forget your password. 8. Enter your e-mail address. You will receive e-mail notification when new information is available in [address] 9. Click Sign Up. You can now view and download portions of your medical record. 10. Click the Download Summary menu link to download a portable copy of your medical information. If you have questions, please visit the Frequently Asked Questions section of the [employer] website. Remember, [employer] is NOT to be used for urgent needs. For medical emergencies, dial 911. Now available from your iPhone and Android! Please provide this summary of care documentation to your next provider. Your primary care clinician is listed as ALTHEA HURTADO. If you have any questions after today's visit, please call [phone].

## 2017-05-23 NOTE — H&P
FOOT AND ANKLE HISTORY AND PHYSICAL      Patient: Mamadou Colvin                   MRN: [id number]         SSN: [id number]  YOB: 1955               AGE: 65 y.o. SEX: female    Patient scheduled for:  Right great toenail matrix excision  Date of surgery: 6/8/17  Surgical Time: Anticipate 45 minutes. Location of Surgery: [address] [place] [address] at [facility]  Surgeon: Arely Espitia. MD Jorge Luis  ANESTHESIA TYPE:  General            PRESCRIPTIONS AND/OR ORDERS PROVIDED DURING H&P:    Orders Placed This Encounter    Generic Supply Order - Post op shoe, walker, application for DMV handicap placard    CULTURE, URINE    URINALYSIS W/ RFLX MICROSCOPIC    CBC WITH AUTOMATED DIFF    METABOLIC PANEL, COMPREHENSIVE    PROTHROMBIN TIME + INR    EKG, 12 LEAD, INITIAL    acetaminophen-codeine (TYLENOL-CODEINE #3) 300-30 mg per tablet    promethazine (PHENERGAN) 25 mg tablet    polyethylene glycol (MIRALAX) 17 gram packet           HISTORY:     The patient was seen in the office today for a preoperative history and physical for an upcoming above listed surgery. The patient is a pleasant 65 y.o. female who has a history of bilateral foot and great(R>L) toe pain. Patient states that her pain is localized on the lateral aspect of her right great toe. Specifically the lateral nail fold. Patient notes a previous procedure where her right great toe ingrown toenail was \"dug out\". She associates her current problems with that procedure. The Zadik procedure was discussed with the patient. She would like to move forward with the procedure bilaterally. 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 Due to the current findings, affected activity of daily living and continued pain and discomfort, surgical intervention is indicated.  The alternatives, risks, and complications, including but not limited to infection, blood loss, need for blood transfusion, neurovascular damage, ceasar-incisional numbness, subcutaneous hematoma, bone fracture, anesthetic complications, DVT, PE, death, RSD, postoperative stiffness and pain, possible surgical scar, delayed healing and nonhealing, reflexive sympathetic dystrophy, damage to blood vessels and nerves, need for more surgery, MI, and stroke have been discussed. The patient understands and wishes to proceed with surgery. PAST MEDICAL HISTORY:     Past Medical History:   Diagnosis Date    Asthma     childhood diagnosis; occasional inhaler use    Chest pain     Diverticulitis     Dyslipidemia     \"borderline\" per pt.  Endometriosis     GERD (gastroesophageal reflux disease)     Hypothyroidism     Normal nuclear stress test 09/14/2011    No ischemia or infarct. EF 75%. No WMA. Neg max EST w/nondiagnostic EKG changes and atypical chest discomfort at peak exercise.  Restless leg syndrome     Thyroid disease     Tobacco use disorder     ongoing       CURRENT MEDICATIONS:     Current Outpatient Prescriptions   Medication Sig Dispense Refill    acetaminophen-codeine (TYLENOL-CODEINE #3) 300-30 mg per tablet Take 1 Tab by mouth every four (4) hours as needed for Pain. 40 Tab 0    promethazine (PHENERGAN) 25 mg tablet Take 1 Tab by mouth every six (6) hours as needed for Nausea. 30 Tab 0    polyethylene glycol (MIRALAX) 17 gram packet Take 1 Packet by mouth daily. 10 Packet 1    atorvastatin (LIPITOR) 40 mg tablet TAKE 1 TABLET BY MOUTH EVERY DAY 30 Tab 0    levothyroxine (SYNTHROID) 88 mcg tablet Take 1 Tab by mouth Daily (before breakfast). 30 Tab 1    fluticasone (FLONASE) 50 mcg/actuation nasal spray USE 2 SPRAYS INTO EACH NOSTRIL EVERY DAY 1 Bottle 1    fluticasone (FLONASE) 50 mcg/actuation nasal spray 2 Sprays by Both Nostrils route daily. 1 Bottle 3    Cetirizine (ZYRTEC) 10 mg cap Take  by mouth daily.       albuterol (PROVENTIL HFA, VENTOLIN HFA, PROAIR HFA) 90 mcg/actuation inhaler Take 2 Puffs by inhalation every four (4) hours as needed for Wheezing. 1 Inhaler 2    valACYclovir (VALTREX) 1 gram tablet Take 1 Tab by mouth two (2) times daily as needed.  azithromycin (ZITHROMAX) 250 mg tablet Take 2 tablets today, then take 1 tablet daily 6 Tab 0    guaiFENesin-codeine (ROBITUSSIN AC) 100-10 mg/5 mL solution Take 5 mL by mouth three (3) times daily as needed for Cough. Max Daily Amount: 15 mL. Do not drive if taking this medication 240 mL 0    ergocalciferol (ERGOCALCIFEROL) 50,000 unit capsule Take 50,000 Units by mouth.  montelukast (SINGULAIR) 10 mg tablet TAKE 1 TABLET BY MOUTH DAILY 30 Tab 0    acetaminophen (TYLENOL) 325 mg tablet Take 500 mg by mouth every four (4) hours as needed for Pain.  ibuprofen (MOTRIN) 800 mg tablet Take 1 Tab by mouth every six (6) hours as needed for Pain.  30 Tab 1       ALLERGIES:     Allergies   Allergen Reactions    Sulfa (Sulfonamide Antibiotics) Hives         SURGICAL HISTORY:     Past Surgical History:   Procedure Laterality Date    HX HYSTERECTOMY      HX HYSTEROSCOPY      HX OVARIAN CYST REMOVAL      teenager    HX TUBAL LIGATION      30 years ago       SOCIAL HISTORY:     Social History     Social History    Marital status:      Spouse name: N/A    Number of children: N/A    Years of education: N/A     Social History Main Topics    Smoking status: Current Every Day Smoker     Packs/day: 0.25     Years: 25.00    Smokeless tobacco: Never Used    Alcohol use Yes      Comment: social    Drug use: No    Sexual activity: Yes     Partners: Male     Birth control/ protection: None     Other Topics Concern    None     Social History Narrative       FAMILY HISTORY:     Family History   Problem Relation Age of Onset    Lung Disease Mother     Cancer Father     Cancer Sister 52     breast cancer    Asthma Sister     Stroke Sister     Asthma Brother     Heart Disease Brother     Hypertension Brother        REVIEW OF SYSTEMS:     Negative for fevers, chills, chest pain, shortness of breath, weight loss, recent illness     General: Negative for fever and chills. No unexpected change in weight. Denies fatigue. No change in appetite. Skin: Negative for rash or itching. HEENT: Negative for congestion, sore throat, neck pain and neck stiffness. No change in vision or hearing. Hasn't noted any enlarged lymph nodes in the neck. Cardiovascular:  Negative for chest pain and palpitations. Has not noted pedal edema. Respiratory: Negative for cough, colds, sinus, hemoptysis, shortness of breath and wheezing. Gastrointestinal: Negative for nausea and vomiting, rectal bleeding, coffee ground emesis, abdominal pain, diarrhea and constipation. Genitourinary: Negative for dysuria, frequency urgency, or burning on micturition. No flank pain, no foul smelling urine, no difficulty with initiating urination. Hematological: Negative for bleeding or easy bruising. Musculoskeletal: Negative  for arthralgias, back pain or neck pain. Neurological: Negative for dizziness, seizures or syncopal episodes. Denies headaches. Endocrine: Denies excessive thirst.  No heat/cold intolerance. Psychiatric: Negative for depression or insomnia. PHYSICAL EXAMINATION:     VITALS:   Visit Vitals    /75    Pulse 73    Temp 98.2 °F (36.8 °C) (Oral)    Ht 5' 5\" (1.651 m)    Wt 201 lb (91.2 kg)    BMI 33.45 kg/m2       GEN:  Well developed, well nourished 65 y.o. female in no acute distress. PSYCH: Alert an oriented to person, place and time. Mood, memory, affect, behavior and judgment normal  HEENT: Normocephalic and atraumatic. Eyes: Conjunctivae and EOM are normal.Pupils are equal, round, and reactive to light. External ear normal appearance, external nose normal appearing. Mouth/Throat: Oropharynx is clear and moist, able to handle oral secretions w/out difficulty, airway patent  NECK: Supple. Normal ROM, No lymphadenopathy. Trachea is midline.  No bruising, swelling or deformity  RESP: Clear to auscultation bilaterally. No wheezes, rales, rhonchi. Normal effort and breath sounds. No respiratory distress  CARDIO: Normal rate, regular rhythm and normal heart sounds. No MGR. ABDOMEN: Soft, non-tender, non-distended, normoactive bowel sounds in all four quadrants. There is no tenderness. There is no rebound and no guarding. BACK: No CVA or spinal tenderness  BREAST:  Deferred  PELVIC:    Deferred   RECTAL:  Deferred   :           Deferred  EXTREMITIES: EXAMINATION OF: Bilateral ANKLE and FOOT      Gait: normal  Tenderness: moderate tenderness to the lateral aspect of the right first toe  Cutaneous: No rashes, skin patches, wounds, or abrasions to the lower legs  Warm and Normal color. No regions of expressible drainage. Medial Border of Tibia Region: absent  Skin color, texture, turgor normal. Normal.  Right foot: Hypertrophic first toe nail plate with fungal infection. Joint Motion: ROM Ankle: Normal , Hindfoot: (ST,TN,CC) Normal, Forefoot toes: Normal  Neurologic Exam: Neuro: Motor: normal 5/5 strength in all tested muscle groups and Sensory: no sensory deficits noted. Contractures: Gastrocnemius or Achilles Contractures absent  Joint / Tendon Stability: No Ankle or Subtalar instability or joint laxity. No peroneal sublux ability or dislocation  Alignment:  Normal Foot Alignment and  Flexible  Vascular: Normal Pulses/ NL Capillary refill, No evidence of DVT seen on physical exam.  No calf swelling, no tenderness to calf muscles. Lymphatic: No Evidence of Lymphedema. RADIOGRAPHS & DIAGNOSTIC STUDIES:     FOOT X RAYS 3 VIEWS Bilateral   4/3/2017     NON WEIGHTBEARING X-RAYS      X RAYS AT [address] [place] [address]  4/3/2017        Bones: No fractures or dislocations.  No focal osteolytic or osteoblastic process  Bone Spurs: No significant bone spurs  Alignment foot: WNL   Joint Condition: No Significant OA  Soft Tissues: Normal, No radiopaque foreign body and No abnormal calcific densities to soft tissues  No ankle joint effusion in lateral projection. Mineralization: Suggests no Osteopen      LABS:     PENDING    ASSESSMENT:       Encounter Diagnoses   Name Primary?  Bilateral foot pain Yes    Ingrown toenail     Pre-operative examination        PLAN:     Again, the alternatives, risks, and complications, as well as expected outcome were discussed. The patient understands and agrees to proceed with the above listed surgery pending completion of preoperative labs and diagnostic studies. Patient has been given Hibiclens wash with instructions and prescriptions and or orders listed above.     Para PEDRITO Little  5/23/2017  3:26 PM

## 2017-05-23 NOTE — LETTER
NOTIFICATION RETURN TO WORK / SCHOOL 
 
5/23/2017 4:23 PM 
 
Ms. Brianna Marie 
[address] [facility] [address] To Whom It May Concern: 
 
Brianna Marie is currently under the care of [address] [place] [address] Oren Gooden. She will be out of work for 4-6 weeks after her foot surgery. If there are questions or concerns please have the patient contact our office. Sincerely, Davis Roca PA-C

## 2017-05-23 NOTE — PATIENT INSTRUCTIONS
Dr. Cecil Ma Pre-operative Instructions:      Patient: Ramona Marie   :  1955     I understand I am to stop taking all Aspirin, Aspirin containing medications, Non-Steroidal Anti-Inflammatory medications (such as Advil, Aleve, Motrin, Ibuprofen) and or Blood thinner medication such as Coumadin, Plavix, Heparin or others 5 days prior to surgery. I understand I am to STOP taking these Medications 5 days prior to surgery:  I am to get instructions from my prescribing physician. 1. __As listed above_______________________  2. _____________________________________  3. _____________________________________  4. _____________________________________    I understand that if I am taking daily medications for high blood pressure, I can take them the morning of surgery with a small sip of water. I will consult my prescribing physician or call [facility] with specific questions. I also understand that:     I am to report important observations or changes that may occur prior to surgery. If I have any changes in my physical condition, such as a rash, a fever, sore throat, abscess, ulcers, nausea, vomiting, or diarrhea. I am to call the office and I am to consult my primary care physician to assess and treat the problem.  I am not to eat or drink anything after midnight the night before my surgery.  I am not to drink alcoholic beverages 24 hours prior to surgery.  I am not to do any illegal drugs prior to surgery.  I am not to smoke at least 24 hours prior to surgery.  I am able and will shower or bathe before surgery. I will use the Hibiclens solution on my surgical site only. The hibiclens directions are one packet a day starting two days before surgery.  I will remove any nail polish, make-up or jewelry prior to arriving for my surgery.  If I wear glasses, contact lenses or dentures they must be removed prior to going to the operating room.      All body piercing and artifical eye-lashes must be removed prior to surgery     I will not wear any aerosol sprays, perfumes or skin creams.  I am to make arrangements for a family member or friend to accompany me to surgery and take me home after my surgery as I will not be allowed to leave the hospital alone. A cab or bus will not be acceptable. Please make arrange for someone to stay with you for 24 hours after surgery.  Patient has expressed understanding of the diagnosis, treatment and planned surgery        Surgery: What to Expect at [address] [place] [address]  This care sheet gives you a general idea about how long it will take for you to recover from your surgery. But each person recovers at a different pace. How can you care for yourself at home? Activity  · Allow your body to heal. Don't move quickly or lift anything heavy until you are feeling better. · Rest when you feel tired. · Your doctor may give you specific instructions on when you can do your normal activities again, such as driving and going back to work. · Be active. Walking is a good choice. Diet  · You can eat your normal diet when you feel well. If your stomach is upset, try bland, low-fat foods like plain rice, broiled chicken, toast, and yogurt. · If your bowel movements are not regular right after surgery, try to avoid constipation and straining. Drink plenty of water. Your doctor may suggest fiber, a stool softener, or a mild laxative. Medicines  · Your doctor will tell you if and when you can restart your medicines. He or she will also give you instructions about taking any new medicines. · If you take blood thinners, such as warfarin (Coumadin), clopidogrel (Plavix), or aspirin, be sure to talk to your doctor. He or she will tell you if and when to start taking those medicines again. Make sure that you understand exactly what your doctor wants you to do. · Be safe with medicines. Read and follow all instructions on the label.   ¨ If the doctor gave you a prescription medicine for pain, take it as prescribed. ¨ If you are not taking a prescription pain medicine, ask your doctor if you can take an over-the-counter medicine. Incision care  · You will have a dressing over the cut (incision). A dressing helps the incision heal and protects it. Your doctor will tell you how to take care of this. · If you have strips of tape on the cut the doctor made, leave the tape on for a week or until it falls off. · If you had stitches, your doctor will tell you when to come back to have them removed. · If you have skin adhesive on the cut, leave it on until it falls off. Skin adhesive is also called liquid stitches. · Change the bandage every day. · Wash the area daily with warm, soapy water, and pat it dry. Don't use hydrogen peroxide or alcohol. They can slow healing. · You may cover the area with a gauze bandage if it oozes fluid or rubs against clothing. · You may shower 24 to 48 hours after surgery. Pat the incision dry. Don't swim or take a bath for the first 2 weeks, or until your doctor tells you it is okay. Follow-up care is a key part of your treatment and safety. Be sure to make and go to all appointments, and call your doctor if you are having problems. It's also a good idea to know your test results and keep a list of the medicines you take. When should you call for help? Call 911 anytime you think you may need emergency care. For example, call if:  · You passed out (lost consciousness). · You have severe trouble breathing. · You have sudden chest pain and shortness of breath, or you cough up blood. Call your doctor now or seek immediate medical care if:  · You have pain that does not get better after you take pain medicine. · You have loose stitches, or your incision comes open. · You are bleeding through your dressing.  A small amount of blood is normal.  · You have signs of infection, such as:  ¨ Increased pain, swelling, warmth, or redness. ¨ Red streaks leading from the incision. ¨ Pus draining from the incision. ¨ A fever. · You have symptoms of a blood clot in your arm or leg (called a deep vein thrombosis). These may include:  ¨ Pain in your calf, back of the knee, thigh, or groin. ¨ Redness and swelling in the arm, leg, or groin. Watch closely for any changes in your health, and be sure to contact your doctor if:  · You do not have a bowel movement after taking a laxative. Where can you learn more? Go to http://michael-jessi.info/  Enter N026 in the search box to learn more about \"Surgery: What to Expect at Home. \"  © 2754-4091 [employer]. Care instructions adapted under license by [employer] (which disclaims liability or warranty for this information). This care instruction is for use with your licensed healthcare professional. If you have questions about a medical condition or this instruction, always ask your healthcare professional. [place] [address] any warranty or liability for your use of this information.   Content Version: 91.3.980772; Current as of: November 20, 2015

## 2017-05-23 NOTE — PROGRESS NOTES
FOOT AND ANKLE HISTORY AND PHYSICAL      Patient: Patience Garcia                   MRN: [id number]         SSN: [id number]  YOB: 1955               AGE: 65 y.o. SEX: female    Patient scheduled for:  Right great toenail matrix excision  Date of surgery: 6/8/17  Surgical Time: Anticipate 45 minutes. Location of Surgery: [address] [place] [address] at [facility]  Surgeon: Latanya Kang MD  ANESTHESIA TYPE:  General          PRESCRIPTIONS AND/OR ORDERS PROVIDED DURING H&P:    Orders Placed This Encounter    Generic Supply Order - Post op shoe, walker, application for DMV handicap placard    CULTURE, URINE    URINALYSIS W/ RFLX MICROSCOPIC    CBC WITH AUTOMATED DIFF    METABOLIC PANEL, COMPREHENSIVE    PROTHROMBIN TIME + INR    EKG, 12 LEAD, INITIAL    acetaminophen-codeine (TYLENOL-CODEINE #3) 300-30 mg per tablet    promethazine (PHENERGAN) 25 mg tablet    polyethylene glycol (MIRALAX) 17 gram packet              HISTORY:     The patient was seen in the office today for a preoperative history and physical for an upcoming above listed surgery. The patient is a pleasant 65 y.o. female who has a history of bilateral foot and great(R>L) toe pain. Patient states that her pain is localized on the lateral aspect of her right great toe. Specifically the lateral nail fold. Patient notes a previous procedure where her right great toe ingrown toenail was \"dug out\". She associates her current problems with that procedure. The Zadik procedure was discussed with the patient. She would like to move forward with the procedure bilaterally. 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 Due to the current findings, affected activity of daily living and continued pain and discomfort, surgical intervention is indicated.  The alternatives, risks, and complications, including but not limited to infection, blood loss, need for blood transfusion, neurovascular damage, ceasar-incisional numbness, subcutaneous hematoma, bone fracture, anesthetic complications, DVT, PE, death, RSD, postoperative stiffness and pain, possible surgical scar, delayed healing and nonhealing, reflexive sympathetic dystrophy, damage to blood vessels and nerves, need for more surgery, MI, and stroke have been discussed. The patient understands and wishes to proceed with surgery. PAST MEDICAL HISTORY:     Past Medical History:   Diagnosis Date    Asthma     childhood diagnosis; occasional inhaler use    Chest pain     Diverticulitis     Dyslipidemia     \"borderline\" per pt.  Endometriosis     GERD (gastroesophageal reflux disease)     Hypothyroidism     Normal nuclear stress test 09/14/2011    No ischemia or infarct. EF 75%. No WMA. Neg max EST w/nondiagnostic EKG changes and atypical chest discomfort at peak exercise.  Restless leg syndrome     Thyroid disease     Tobacco use disorder     ongoing       CURRENT MEDICATIONS:     Current Outpatient Prescriptions   Medication Sig Dispense Refill    acetaminophen-codeine (TYLENOL-CODEINE #3) 300-30 mg per tablet Take 1 Tab by mouth every four (4) hours as needed for Pain. 40 Tab 0    promethazine (PHENERGAN) 25 mg tablet Take 1 Tab by mouth every six (6) hours as needed for Nausea. 30 Tab 0    polyethylene glycol (MIRALAX) 17 gram packet Take 1 Packet by mouth daily. 10 Packet 1    atorvastatin (LIPITOR) 40 mg tablet TAKE 1 TABLET BY MOUTH EVERY DAY 30 Tab 0    levothyroxine (SYNTHROID) 88 mcg tablet Take 1 Tab by mouth Daily (before breakfast). 30 Tab 1    fluticasone (FLONASE) 50 mcg/actuation nasal spray USE 2 SPRAYS INTO EACH NOSTRIL EVERY DAY 1 Bottle 1    fluticasone (FLONASE) 50 mcg/actuation nasal spray 2 Sprays by Both Nostrils route daily. 1 Bottle 3    Cetirizine (ZYRTEC) 10 mg cap Take  by mouth daily.       albuterol (PROVENTIL HFA, VENTOLIN HFA, PROAIR HFA) 90 mcg/actuation inhaler Take 2 Puffs by inhalation every four (4) hours as needed for Wheezing. 1 Inhaler 2    valACYclovir (VALTREX) 1 gram tablet Take 1 Tab by mouth two (2) times daily as needed.  azithromycin (ZITHROMAX) 250 mg tablet Take 2 tablets today, then take 1 tablet daily 6 Tab 0    guaiFENesin-codeine (ROBITUSSIN AC) 100-10 mg/5 mL solution Take 5 mL by mouth three (3) times daily as needed for Cough. Max Daily Amount: 15 mL. Do not drive if taking this medication 240 mL 0    ergocalciferol (ERGOCALCIFEROL) 50,000 unit capsule Take 50,000 Units by mouth.  montelukast (SINGULAIR) 10 mg tablet TAKE 1 TABLET BY MOUTH DAILY 30 Tab 0    acetaminophen (TYLENOL) 325 mg tablet Take 500 mg by mouth every four (4) hours as needed for Pain.  ibuprofen (MOTRIN) 800 mg tablet Take 1 Tab by mouth every six (6) hours as needed for Pain.  30 Tab 1       ALLERGIES:     Allergies   Allergen Reactions    Sulfa (Sulfonamide Antibiotics) Hives         SURGICAL HISTORY:     Past Surgical History:   Procedure Laterality Date    HX HYSTERECTOMY      HX HYSTEROSCOPY      HX OVARIAN CYST REMOVAL      teenager    HX TUBAL LIGATION      30 years ago       SOCIAL HISTORY:     Social History     Social History    Marital status:      Spouse name: N/A    Number of children: N/A    Years of education: N/A     Social History Main Topics    Smoking status: Current Every Day Smoker     Packs/day: 0.25     Years: 25.00    Smokeless tobacco: Never Used    Alcohol use Yes      Comment: social    Drug use: No    Sexual activity: Yes     Partners: Male     Birth control/ protection: None     Other Topics Concern    None     Social History Narrative       FAMILY HISTORY:     Family History   Problem Relation Age of Onset    Lung Disease Mother     Cancer Father     Cancer Sister 52     breast cancer    Asthma Sister     Stroke Sister     Asthma Brother     Heart Disease Brother     Hypertension Brother        REVIEW OF SYSTEMS:     Negative for fevers, chills, chest pain, shortness of breath, weight loss, recent illness     General: Negative for fever and chills. No unexpected change in weight. Denies fatigue. No change in appetite. Skin: Negative for rash or itching. HEENT: Negative for congestion, sore throat, neck pain and neck stiffness. No change in vision or hearing. Hasn't noted any enlarged lymph nodes in the neck. Cardiovascular:  Negative for chest pain and palpitations. Has not noted pedal edema. Respiratory: Negative for cough, colds, sinus, hemoptysis, shortness of breath and wheezing. Gastrointestinal: Negative for nausea and vomiting, rectal bleeding, coffee ground emesis, abdominal pain, diarrhea and constipation. Genitourinary: Negative for dysuria, frequency urgency, or burning on micturition. No flank pain, no foul smelling urine, no difficulty with initiating urination. Hematological: Negative for bleeding or easy bruising. Musculoskeletal: Negative  for arthralgias, back pain or neck pain. Neurological: Negative for dizziness, seizures or syncopal episodes. Denies headaches. Endocrine: Denies excessive thirst.  No heat/cold intolerance. Psychiatric: Negative for depression or insomnia. PHYSICAL EXAMINATION:     VITALS:   Visit Vitals    /75    Pulse 73    Temp 98.2 °F (36.8 °C) (Oral)    Ht 5' 5\" (1.651 m)    Wt 201 lb (91.2 kg)    BMI 33.45 kg/m2       GEN:  Well developed, well nourished 65 y.o. female in no acute distress. PSYCH: Alert an oriented to person, place and time. Mood, memory, affect, behavior and judgment normal  HEENT: Normocephalic and atraumatic. Eyes: Conjunctivae and EOM are normal.Pupils are equal, round, and reactive to light. External ear normal appearance, external nose normal appearing. Mouth/Throat: Oropharynx is clear and moist, able to handle oral secretions w/out difficulty, airway patent  NECK: Supple. Normal ROM, No lymphadenopathy. Trachea is midline.  No bruising, swelling or deformity  RESP: Clear to auscultation bilaterally. No wheezes, rales, rhonchi. Normal effort and breath sounds. No respiratory distress  CARDIO: Normal rate, regular rhythm and normal heart sounds. No MGR. ABDOMEN: Soft, non-tender, non-distended, normoactive bowel sounds in all four quadrants. There is no tenderness. There is no rebound and no guarding. BACK: No CVA or spinal tenderness  BREAST:  Deferred  PELVIC:    Deferred   RECTAL:  Deferred   :           Deferred  EXTREMITIES: EXAMINATION OF: Bilateral ANKLE and FOOT      Gait: normal  Tenderness: moderate tenderness to the lateral aspect of the right first toe  Cutaneous: No rashes, skin patches, wounds, or abrasions to the lower legs  Warm and Normal color. No regions of expressible drainage. Medial Border of Tibia Region: absent  Skin color, texture, turgor normal. Normal.  Right foot: Hypertrophic first toe nail plate with fungal infection. Joint Motion: ROM Ankle: Normal , Hindfoot: (ST,TN,CC) Normal, Forefoot toes: Normal  Neurologic Exam: Neuro: Motor: normal 5/5 strength in all tested muscle groups and Sensory: no sensory deficits noted. Contractures: Gastrocnemius or Achilles Contractures absent  Joint / Tendon Stability: No Ankle or Subtalar instability or joint laxity. No peroneal sublux ability or dislocation  Alignment:  Normal Foot Alignment and  Flexible  Vascular: Normal Pulses/ NL Capillary refill, No evidence of DVT seen on physical exam.  No calf swelling, no tenderness to calf muscles. Lymphatic: No Evidence of Lymphedema. RADIOGRAPHS & DIAGNOSTIC STUDIES:     FOOT X RAYS 3 VIEWS Bilateral   4/3/2017     NON WEIGHTBEARING X-RAYS      X RAYS AT [address] [place] [address]  4/3/2017        Bones: No fractures or dislocations.  No focal osteolytic or osteoblastic process  Bone Spurs: No significant bone spurs  Alignment foot: WNL   Joint Condition: No Significant OA  Soft Tissues: Normal, No radiopaque foreign body and No abnormal calcific densities to soft tissues  No ankle joint effusion in lateral projection. Mineralization: Suggests no Osteopen      LABS:     PENDING    ASSESSMENT:       Encounter Diagnoses   Name Primary?  Bilateral foot pain Yes    Ingrown toenail     Pre-operative examination        PLAN:     Again, the alternatives, risks, and complications, as well as expected outcome were discussed. The patient understands and agrees to proceed with the above listed surgery pending completion of preoperative labs and diagnostic studies. Patient has been given Hibiclens wash with instructions and prescriptions and or orders listed above.     Leigh Nick PA-C  5/23/2017  3:26 PM

## 2017-06-01 ENCOUNTER — HOSPITAL ENCOUNTER (OUTPATIENT)
Dept: PREADMISSION TESTING | Age: 62
Discharge: HOME | End: 2017-06-01
Payer: COMMERCIAL

## 2017-06-01 DIAGNOSIS — Z01.818 PRE-OPERATIVE EXAMINATION: ICD-10-CM

## 2017-06-01 LAB
ALBUMIN SERPL BCP-MCNC: 4.1 G/DL (ref 3.4–5)
ALBUMIN/GLOB SERPL: 1.3 {RATIO} (ref 0.8–1.7)
ALP SERPL-CCNC: 75 U/L (ref 45–117)
ALT SERPL-CCNC: 21 U/L (ref 13–56)
ANION GAP BLD CALC-SCNC: 7 MMOL/L (ref 3–18)
APPEARANCE UR: CLEAR
AST SERPL W P-5'-P-CCNC: 9 U/L (ref 15–37)
ATRIAL RATE: 77 BPM
BACTERIA URNS QL MICRO: NEGATIVE /HPF
BASOPHILS # BLD AUTO: 0.1 K/UL (ref 0–0.1)
BASOPHILS # BLD: 1 % (ref 0–2)
BILIRUB SERPL-MCNC: 0.4 MG/DL (ref 0.2–1)
BILIRUB UR QL: NEGATIVE
BUN SERPL-MCNC: 15 MG/DL (ref 7–18)
BUN/CREAT SERPL: 21 (ref 12–20)
CALCIUM SERPL-MCNC: 9.3 MG/DL (ref 8.5–10.1)
CALCULATED P AXIS, ECG09: 70 DEGREES
CALCULATED T AXIS, ECG11: 67 DEGREES
CHLORIDE SERPL-SCNC: 107 MMOL/L (ref 100–108)
CO2 SERPL-SCNC: 25 MMOL/L (ref 21–32)
COLOR UR: YELLOW
CREAT SERPL-MCNC: 0.71 MG/DL (ref 0.6–1.3)
DIAGNOSIS, 93000: NORMAL
DIFFERENTIAL METHOD BLD: NORMAL
EOSINOPHIL # BLD: 0.1 K/UL (ref 0–0.4)
EOSINOPHIL NFR BLD: 2 % (ref 0–5)
EPITH CASTS URNS QL MICRO: NORMAL /LPF (ref 0–5)
ERYTHROCYTE [DISTWIDTH] IN BLOOD BY AUTOMATED COUNT: 12 % (ref 11.6–14.5)
GLOBULIN SER CALC-MCNC: 3.1 G/DL (ref 2–4)
GLUCOSE SERPL-MCNC: 89 MG/DL (ref 74–99)
GLUCOSE UR STRIP.AUTO-MCNC: NEGATIVE MG/DL
HCT VFR BLD AUTO: 43 % (ref 35–45)
HGB BLD-MCNC: 14.7 G/DL (ref 12–16)
HGB UR QL STRIP: ABNORMAL
INR PPP: 1 (ref 0.8–1.2)
KETONES UR QL STRIP.AUTO: NEGATIVE MG/DL
LEUKOCYTE ESTERASE UR QL STRIP.AUTO: NEGATIVE
LYMPHOCYTES # BLD AUTO: 37 % (ref 21–52)
LYMPHOCYTES # BLD: 2.9 K/UL (ref 0.9–3.6)
MCH RBC QN AUTO: 32 PG (ref 24–34)
MCHC RBC AUTO-ENTMCNC: 34.2 G/DL (ref 31–37)
MCV RBC AUTO: 93.7 FL (ref 74–97)
MONOCYTES # BLD: 0.3 K/UL (ref 0.05–1.2)
MONOCYTES NFR BLD AUTO: 4 % (ref 3–10)
NEUTS SEG # BLD: 4.4 K/UL (ref 1.8–8)
NEUTS SEG NFR BLD AUTO: 56 % (ref 40–73)
NITRITE UR QL STRIP.AUTO: NEGATIVE
P-R INTERVAL, ECG05: 166 MS
PH UR STRIP: 5 [PH] (ref 5–8)
PLATELET # BLD AUTO: 270 K/UL (ref 135–420)
PMV BLD AUTO: 9.4 FL (ref 9.2–11.8)
POTASSIUM SERPL-SCNC: 4.2 MMOL/L (ref 3.5–5.5)
PROT SERPL-MCNC: 7.2 G/DL (ref 6.4–8.2)
PROT UR STRIP-MCNC: NEGATIVE MG/DL
PROTHROMBIN TIME: 13 SEC (ref 11.5–15.2)
Q-T INTERVAL, ECG07: 388 MS
QRS DURATION, ECG06: 100 MS
QTC CALCULATION (BEZET), ECG08: 439 MS
RBC # BLD AUTO: 4.59 M/UL (ref 4.2–5.3)
RBC #/AREA URNS HPF: NORMAL /HPF (ref 0–5)
SODIUM SERPL-SCNC: 139 MMOL/L (ref 136–145)
SP GR UR REFRACTOMETRY: 1.02 (ref 1–1.03)
UROBILINOGEN UR QL STRIP.AUTO: 1 EU/DL (ref 0.2–1)
VENTRICULAR RATE, ECG03: 77 BPM
WBC # BLD AUTO: 7.8 K/UL (ref 4.6–13.2)
WBC URNS QL MICRO: NEGATIVE /HPF (ref 0–4)

## 2017-06-01 PROCEDURE — 87086 URINE CULTURE/COLONY COUNT: CPT | Performed by: PHYSICIAN ASSISTANT

## 2017-06-01 PROCEDURE — 80053 COMPREHEN METABOLIC PANEL: CPT | Performed by: PHYSICIAN ASSISTANT

## 2017-06-01 PROCEDURE — 81001 URINALYSIS AUTO W/SCOPE: CPT | Performed by: PHYSICIAN ASSISTANT

## 2017-06-01 PROCEDURE — 85025 COMPLETE CBC W/AUTO DIFF WBC: CPT | Performed by: PHYSICIAN ASSISTANT

## 2017-06-01 PROCEDURE — 36415 COLL VENOUS BLD VENIPUNCTURE: CPT | Performed by: PHYSICIAN ASSISTANT

## 2017-06-01 PROCEDURE — 93005 ELECTROCARDIOGRAM TRACING: CPT

## 2017-06-01 PROCEDURE — 85610 PROTHROMBIN TIME: CPT | Performed by: PHYSICIAN ASSISTANT

## 2017-06-02 LAB
BACTERIA SPEC CULT: NORMAL
SERVICE CMNT-IMP: NORMAL

## 2017-06-14 ENCOUNTER — OFFICE VISIT (OUTPATIENT)
Dept: ORTHOPEDIC SURGERY | Age: 62
End: 2017-06-14

## 2017-06-14 VITALS — SYSTOLIC BLOOD PRESSURE: 123 MMHG | TEMPERATURE: 98 F | HEART RATE: 70 BPM | DIASTOLIC BLOOD PRESSURE: 73 MMHG

## 2017-06-14 DIAGNOSIS — M79.671 BILATERAL FOOT PAIN: Primary | ICD-10-CM

## 2017-06-14 DIAGNOSIS — L60.0 INGROWN TOENAIL: ICD-10-CM

## 2017-06-14 DIAGNOSIS — M79.672 BILATERAL FOOT PAIN: Primary | ICD-10-CM

## 2017-06-14 DIAGNOSIS — Z98.890 POST-OPERATIVE STATE: ICD-10-CM

## 2017-06-14 NOTE — PATIENT INSTRUCTIONS
· Dressing: changed in the office today. Patient placed in post op shoe    · Keep the current dressings on and in place. You need to keep this incision clean and dry. If you have a splint or cast on please keep this clean and dry as well. · You should expect swelling and bruising in the area over the next several days. You may elevate the right extremity on 1 pillow. Place the pillow horizontal so that no pressure is on the back of your heel. You may apply an icepack on top of the dressing to help minimize the swelling. · Keep all pets away from  any wound present in order to prevent infection. · Continue Activity modification    · Weight bearing status:  partial weight bearing to the right lower extremity    · No lifting, twisting, squatting, deep bending, driving or strenuous activity. PLEASE SEEK IMMEDIATE ASSESSMENT BY ER PHYSICIAN IF ANY OF THE FOLLOWING EXIST:     Excessive pain, swelling, redness or odor of or around the surgical area   Temperature over 100.5   Nausea and vomiting lasting longer than 4 hours or if unable to take medications   Any signs of decreased circulation or nerve impairment to extremity: change in color, persistent numbness, tingling, coldness or increase pain   If any calf pain, calf tightness, shortness of breath, chest pain   Any difficulty breathing at rest or with ambulation, any chest tightness/soreness  Severe intractable pain, persistent swelling or drainage, development of a wound, incisional redness, finger/toe swelling or color changes, or CALF PAIN    · Perform ankle pumps with non-surgical/non-injured extremity to help reduce the risk of blood clots    · Please follow up in 2 weeks for suture removal           Ingrown Toenail: Care Instructions  Your Care Instructions    An ingrown toenail often occurs because a nail is not trimmed correctly or because shoes are too tight. An ingrown nail can cause an infection.   If your toe is infected, your doctor may prescribe antibiotics. Most ingrown toenails can be treated at home. You should trim toenails straight across, so the ends of the nail grow over the skin and not into it. Good nail care can prevent ingrown toenails. Follow-up care is a key part of your treatment and safety. Be sure to make and go to all appointments, and call your doctor if you are having problems. It's also a good idea to know your test results and keep a list of the medicines you take. How can you care for yourself at home? · Trim the nails straight across. Leave the corners a little longer so they do not cut into the skin. To do this when you have an ingrown nail:  ¨ Soak your foot in warm water for about 15 minutes to soften the nail. ¨ Wedge a small piece of wet cotton under the corner of the nail to cushion the nail and lift it slightly. This keeps it from cutting the skin. ¨ Repeat daily until the nail has grown out and can be trimmed. · Do not use manicure scissors to dig under the ingrown nail. You might stab your toe, which could get infected. · Do not trim your toenails too short. · Check with your doctor before trimming your own toenails if you have been diagnosed with diabetes or peripheral arterial disease. These conditions increase the risk of an infection, because you may have decreased sensation in your toes and cut yourself without knowing it. · Wear roomy, comfortable shoes. · If your doctor prescribed antibiotics, take them as directed. Do not stop taking them just because you feel better. You need to take the full course of antibiotics. When should you call for help? Call your doctor now or seek immediate medical care if:  · You have signs of infection, such as:  ¨ Increased pain, swelling, warmth, or redness. ¨ Red streaks leading from the toe. ¨ Pus draining from the toe. ¨ A fever.   Watch closely for changes in your health, and be sure to contact your doctor if:  · You have problems trimming your nails.  · You do not get better as expected. Where can you learn more? Go to http://michael-jessi.info/. Enter R135 in the search box to learn more about \"Ingrown Toenail: Care Instructions. \"  Current as of: October 13, 2016  Content Version: 11.2  © 8163-9189 [employer]. Care instructions adapted under license by [employer] (which disclaims liability or warranty for this information). If you have questions about a medical condition or this instruction, always ask your healthcare professional. [address] any warranty or liability for your use of this information.

## 2017-06-14 NOTE — MR AVS SNAPSHOT
Visit Information Date & Time Provider Department Dept. Phone Encounter #  
 6/14/2017  1:30  Joel Terrazas, Jaspreet S Chemo [facility] [phone] [id number] Follow-up Instructions Return in about 2 weeks (around 6/28/2017) for follow up evaluation, suture removal. Upcoming Health Maintenance Date Due Hepatitis C Screening 1955 Pneumococcal 19-64 Medium Risk (1 of 1 - PPSV23) 2/5/1975 DTaP/Tdap/Td series (1 - Tdap) 2/5/1977 PAP AKA CERVICAL CYTOLOGY 2/5/1977 ZOSTER VACCINE AGE 60> 2/5/2016 INFLUENZA AGE 9 TO ADULT 8/1/2017 BREAST CANCER SCRN MAMMOGRAM 12/28/2017 COLONOSCOPY 11/28/2021 Allergies as of 6/14/2017  Review Complete On: 6/14/2017 By: Joaquina Terrazas PA-C Severity Noted Reaction Type Reactions Sulfa (Sulfonamide Antibiotics)  09/21/2011    Hives Current Immunizations  Reviewed on 10/31/2016 Name Date Influenza Vaccine 9/1/2016 Not reviewed this visit You Were Diagnosed With   
  
 Codes Comments Bilateral foot pain    -  Primary ICD-10-CM: M79.671, A31.284 ICD-9-CM: 729.5 Ingrown toenail     ICD-10-CM: L60.0 ICD-9-CM: 703.0 Post-operative state     ICD-10-CM: S65.619 ICD-9-CM: V45.89 Vitals BP Pulse Temp OB Status Smoking Status 123/73 (BP 1 Location: Left arm, BP Patient Position: Sitting) 70 98 °F (36.7 °C) (Oral) Hysterectomy Current Every Day Smoker Preferred Pharmacy Pharmacy Name Phone [address], [address] [facility] [address] [facility] [phone] Your Updated Medication List  
  
   
This list is accurate as of: 6/14/17  1:47 PM.  Always use your most recent med list.  
  
  
  
  
 acetaminophen-codeine 300-30 mg per tablet Commonly known as:  TYLENOL-CODEINE #3 Take 1 Tab by mouth every four (4) hours as needed for Pain. albuterol 90 mcg/actuation inhaler Commonly known as:  PROVENTIL HFA, VENTOLIN HFA, PROAIR HFA Take 2 Puffs by inhalation every four (4) hours as needed for Wheezing. atorvastatin 40 mg tablet Commonly known as:  LIPITOR  
TAKE 1 TABLET BY MOUTH EVERY DAY  
  
 azithromycin 250 mg tablet Commonly known as:  Graemesindi  Take 2 tablets today, then take 1 tablet daily  
  
 ergocalciferol 50,000 unit capsule Commonly known as:  ERGOCALCIFEROL Take 50,000 Units by mouth. * fluticasone 50 mcg/actuation nasal spray Commonly known as:  Tania Brooke 2 Sprays by Both Nostrils route daily. * fluticasone 50 mcg/actuation nasal spray Commonly known as:  FLONASE  
USE 2 SPRAYS INTO EACH NOSTRIL EVERY DAY  
  
 guaiFENesin-codeine 100-10 mg/5 mL solution Commonly known as:  ROBITUSSIN AC Take 5 mL by mouth three (3) times daily as needed for Cough. Max Daily Amount: 15 mL. Do not drive if taking this medication  
  
 ibuprofen 800 mg tablet Commonly known as:  MOTRIN Take 1 Tab by mouth every six (6) hours as needed for Pain.  
  
 levothyroxine 88 mcg tablet Commonly known as:  SYNTHROID Take 1 Tab by mouth Daily (before breakfast). montelukast 10 mg tablet Commonly known as:  SINGULAIR  
TAKE 1 TABLET BY MOUTH DAILY polyethylene glycol 17 gram packet Commonly known as:  Angeli Edmond Take 1 Packet by mouth daily. promethazine 25 mg tablet Commonly known as:  PHENERGAN Take 1 Tab by mouth every six (6) hours as needed for Nausea. TYLENOL 325 mg tablet Generic drug:  acetaminophen Take 500 mg by mouth every four (4) hours as needed for Pain. valACYclovir 1 gram tablet Commonly known as:  VALTREX Take 1 Tab by mouth two (2) times daily as needed. ZyrTEC 10 mg Cap Generic drug:  Cetirizine Take  by mouth daily. * Notice: This list has 2 medication(s) that are the same as other medications prescribed for you.  Read the directions carefully, and ask your doctor or other care provider to review them with you. Follow-up Instructions Return in about 2 weeks (around 6/28/2017) for follow up evaluation, suture removal.  
  
  
Patient Instructions · Dressing: changed in the office today. Patient placed in post op shoe · Keep the current dressings on and in place. You need to keep this incision clean and dry. If you have a splint or cast on please keep this clean and dry as well. · You should expect swelling and bruising in the area over the next several days. You may elevate the right extremity on 1 pillow. Place the pillow horizontal so that no pressure is on the back of your heel. You may apply an icepack on top of the dressing to help minimize the swelling. · Keep all pets away from  any wound present in order to prevent infection. · Continue Activity modification · Weight bearing status:  partial weight bearing to the right lower extremity · No lifting, twisting, squatting, deep bending, driving or strenuous activity. PLEASE SEEK IMMEDIATE ASSESSMENT BY ER PHYSICIAN IF ANY OF THE FOLLOWING EXIST:  
 
Excessive pain, swelling, redness or odor of or around the surgical area Temperature over 100.5 Nausea and vomiting lasting longer than 4 hours or if unable to take medications Any signs of decreased circulation or nerve impairment to extremity: change in color, persistent numbness, tingling, coldness or increase pain If any calf pain, calf tightness, shortness of breath, chest pain Any difficulty breathing at rest or with ambulation, any chest tightness/soreness Severe intractable pain, persistent swelling or drainage, development of a wound, incisional redness, finger/toe swelling or color changes, or CALF PAIN 
 
· Perform ankle pumps with non-surgical/non-injured extremity to help reduce the risk of blood clots · Please follow up in 2 weeks for suture removal 
 
 
 
  
 Ingrown Toenail: Care Instructions Your Care Instructions An ingrown toenail often occurs because a nail is not trimmed correctly or because shoes are too tight. An ingrown nail can cause an infection. If your toe is infected, your doctor may prescribe antibiotics. Most ingrown toenails can be treated at home. You should trim toenails straight across, so the ends of the nail grow over the skin and not into it. Good nail care can prevent ingrown toenails. Follow-up care is a key part of your treatment and safety. Be sure to make and go to all appointments, and call your doctor if you are having problems. It's also a good idea to know your test results and keep a list of the medicines you take. How can you care for yourself at home? · Trim the nails straight across. Leave the corners a little longer so they do not cut into the skin. To do this when you have an ingrown nail: 
¨ Soak your foot in warm water for about 15 minutes to soften the nail. ¨ Wedge a small piece of wet cotton under the corner of the nail to cushion the nail and lift it slightly. This keeps it from cutting the skin. ¨ Repeat daily until the nail has grown out and can be trimmed. · Do not use manicure scissors to dig under the ingrown nail. You might stab your toe, which could get infected. · Do not trim your toenails too short. · Check with your doctor before trimming your own toenails if you have been diagnosed with diabetes or peripheral arterial disease. These conditions increase the risk of an infection, because you may have decreased sensation in your toes and cut yourself without knowing it. · Wear roomy, comfortable shoes. · If your doctor prescribed antibiotics, take them as directed. Do not stop taking them just because you feel better. You need to take the full course of antibiotics. When should you call for help? Call your doctor now or seek immediate medical care if: 
· You have signs of infection, such as: ¨ Increased pain, swelling, warmth, or redness. ¨ Red streaks leading from the toe. ¨ Pus draining from the toe. ¨ A fever. Watch closely for changes in your health, and be sure to contact your doctor if: 
· You have problems trimming your nails. · You do not get better as expected. Where can you learn more? Go to http://michael-jessi.info/. Enter R135 in the search box to learn more about \"Ingrown Toenail: Care Instructions. \" Current as of: October 13, 2016 Content Version: 11.2 © 7290-7799 [employer]. Care instructions adapted under license by [employer] (which disclaims liability or warranty for this information). If you have questions about a medical condition or this instruction, always ask your healthcare professional. [address] any warranty or liability for your use of this information. Introducing [facility]! Jenny Fried introduces Jivox patient portal. Now you can access parts of your medical record, email your doctor's office, and request medication refills online. 1. In your internet browser, go to [email or web address] 2. Click on the First Time User? Click Here link in the Sign In box. You will see the New Member Sign Up page. 3. Enter your Jivox Access Code exactly as it appears below. You will not need to use this code after youve completed the sign-up process. If you do not sign up before the expiration date, you must request a new code. · Jivox Access Code: [email or web address] Expires: 8/21/2017  3:26 PM 
 
4. Enter the last four digits of your Social Security Number (xxxx) and Date of Birth (mm/dd/yyyy) as indicated and click Submit. You will be taken to the next sign-up page. 5. Create a Jivox ID. This will be your Jivox login ID and cannot be changed, so think of one that is secure and easy to remember. 6. Create a WorkFlowy password. You can change your password at any time. 7. Enter your Password Reset Question and Answer. This can be used at a later time if you forget your password. 8. Enter your e-mail address. You will receive e-mail notification when new information is available in [address] 9. Click Sign Up. You can now view and download portions of your medical record. 10. Click the Download Summary menu link to download a portable copy of your medical information. If you have questions, please visit the Frequently Asked Questions section of the [employer] website. Remember, WorkFlowy is NOT to be used for urgent needs. For medical emergencies, dial 911. Now available from your iPhone and Android! Please provide this summary of care documentation to your next provider. Your primary care clinician is listed as ALTHEA HURTADO. If you have any questions after today's visit, please call [phone].

## 2017-06-14 NOTE — PROGRESS NOTES
Patient: Joel Thomas                MRN: [id number]       SSN: [id number]  YOB: 1955           AGE: 65 y.o. SEX: female    PCP:Lea Mckeon NP    POST OP OFFICE NOTE  DOS: 6/8/17    Chief Complaint:   Chief Complaint   Patient presents with    Foot Pain     Left       HPI:     The patient is a 65 y.o. female who presents today for follow up 6 days s/p Right great toenail matrix excision. Patient has been PWB to the right lower extremity. Patient reports doing well other than post op pain that is controlled. Patient denies any fever, chills, chest pain, shortness of breath or calf pain. There are no new illness or injuries to report since last seen in the office. PHYSICAL EXAM:     Visit Vitals    /73 (BP 1 Location: Left arm, BP Patient Position: Sitting)    Pulse 70    Temp 98 °F (36.7 °C) (Oral)       GEN:  Alert, well developed, well nourished, well appearing 65 y.o. female in no acute distress. PSYCH:  Normal affect, mood, and conduct. alert, oriented x 3 alert, oriented x 3, no dementia  M/S EXAMINATION OF: right foot  DRESSINGS: CDI  DRAINAGE: none  INCISION: Incision looks good, skin well approximated, no dehiscence, nylon sutures in place without disruption. SKIN: mild edema , no erythema, no  ecchymosis, no warmth    TENDERNESS:  mild tenderness to palpation (as expected after surgery)  NEUROVASCULAR:  grossly intact. Positive distal pulses and capillary refill. DVT ASSESSMENT:  The calf is not tender to palpation. No evidence of DVT seen on physical exam.  ROM: not tested       RADIOGRAPHS & DIAGNOSTIC STUDIES     No results found for any visits on 06/14/17. IMPRESSION:     Encounter Diagnoses     ICD-10-CM ICD-9-CM   1. Bilateral foot pain M79.671 729.5    M79.672    2. Ingrown toenail L60.0 703.0   3. Post-operative state Z98.890 V45.89       PLAN:         No orders of the defined types were placed in this encounter.             · Dressing: changed in the office today. Patient placed in post op shoe    · Keep the current dressings on and in place. You need to keep this incision clean and dry. If you have a splint or cast on please keep this clean and dry as well. · You should expect swelling and bruising in the area over the next several days. You may elevate the right extremity on 1 pillow. Place the pillow horizontal so that no pressure is on the back of your heel. You may apply an icepack on top of the dressing to help minimize the swelling. · Keep all pets away from  any wound present in order to prevent infection. · Continue Activity modification    · Weight bearing status:  partial weight bearing to the right lower extremity    · No lifting, twisting, squatting, deep bending, driving or strenuous activity. PLEASE SEEK IMMEDIATE ASSESSMENT BY ER PHYSICIAN IF ANY OF THE FOLLOWING EXIST:     Excessive pain, swelling, redness or odor of or around the surgical area   Temperature over 100.5   Nausea and vomiting lasting longer than 4 hours or if unable to take medications   Any signs of decreased circulation or nerve impairment to extremity: change in color, persistent numbness, tingling, coldness or increase pain   If any calf pain, calf tightness, shortness of breath, chest pain   Any difficulty breathing at rest or with ambulation, any chest tightness/soreness  Severe intractable pain, persistent swelling or drainage, development of a wound, incisional redness, finger/toe swelling or color changes, or CALF PAIN    · Perform ankle pumps with non-surgical/non-injured extremity to help reduce the risk of blood clots    · Please follow up in 2 weeks for suture removal              · DO NOT DRIVE WHILE TAKING NARCOTIC PAIN MEDICINE    Patient expresses understanding of the plan. Patient education provided on post surgical care.       REVIEW OF SYSTEMS:     Otherwise as noted in HPI     REVIEW OF SYSTEMS: All Below are Negative except: See HPI     PAST MEDICAL HISTORY:     Past Medical History:   Diagnosis Date    Asthma     childhood diagnosis; occasional inhaler use    Chest pain     Diverticulitis     Dyslipidemia     \"borderline\" per pt.  Endometriosis     GERD (gastroesophageal reflux disease)     Hypothyroidism     Normal nuclear stress test 09/14/2011    No ischemia or infarct. EF 75%. No WMA. Neg max EST w/nondiagnostic EKG changes and atypical chest discomfort at peak exercise.  Restless leg syndrome     Thyroid disease     Tobacco use disorder     ongoing       MEDICATIONS:     Current Outpatient Prescriptions   Medication Sig    acetaminophen-codeine (TYLENOL-CODEINE #3) 300-30 mg per tablet Take 1 Tab by mouth every four (4) hours as needed for Pain.  polyethylene glycol (MIRALAX) 17 gram packet Take 1 Packet by mouth daily.  atorvastatin (LIPITOR) 40 mg tablet TAKE 1 TABLET BY MOUTH EVERY DAY    levothyroxine (SYNTHROID) 88 mcg tablet Take 1 Tab by mouth Daily (before breakfast).  valACYclovir (VALTREX) 1 gram tablet Take 1 Tab by mouth two (2) times daily as needed.  fluticasone (FLONASE) 50 mcg/actuation nasal spray 2 Sprays by Both Nostrils route daily.  Cetirizine (ZYRTEC) 10 mg cap Take  by mouth daily.  guaiFENesin-codeine (ROBITUSSIN AC) 100-10 mg/5 mL solution Take 5 mL by mouth three (3) times daily as needed for Cough. Max Daily Amount: 15 mL. Do not drive if taking this medication    ergocalciferol (ERGOCALCIFEROL) 50,000 unit capsule Take 50,000 Units by mouth.  montelukast (SINGULAIR) 10 mg tablet TAKE 1 TABLET BY MOUTH DAILY    albuterol (PROVENTIL HFA, VENTOLIN HFA, PROAIR HFA) 90 mcg/actuation inhaler Take 2 Puffs by inhalation every four (4) hours as needed for Wheezing.  acetaminophen (TYLENOL) 325 mg tablet Take 500 mg by mouth every four (4) hours as needed for Pain.     ibuprofen (MOTRIN) 800 mg tablet Take 1 Tab by mouth every six (6) hours as needed for Pain.  promethazine (PHENERGAN) 25 mg tablet Take 1 Tab by mouth every six (6) hours as needed for Nausea.  fluticasone (FLONASE) 50 mcg/actuation nasal spray USE 2 SPRAYS INTO EACH NOSTRIL EVERY DAY    azithromycin (ZITHROMAX) 250 mg tablet Take 2 tablets today, then take 1 tablet daily     No current facility-administered medications for this visit. ALLERGIES:     Allergies   Allergen Reactions    Sulfa (Sulfonamide Antibiotics) Hives         PAST SURGICAL HISTORY:     Past Surgical History:   Procedure Laterality Date    HX HYSTERECTOMY      HX HYSTEROSCOPY      HX OVARIAN CYST REMOVAL      teenager    HX TUBAL LIGATION      30 years ago       SOCIAL HISTORY:     Social History     Social History    Marital status:      Spouse name: N/A    Number of children: N/A    Years of education: N/A     Occupational History    Not on file.      Social History Main Topics    Smoking status: Current Every Day Smoker     Packs/day: 0.25     Years: 25.00    Smokeless tobacco: Never Used    Alcohol use Yes      Comment: social    Drug use: No    Sexual activity: Yes     Partners: Male     Birth control/ protection: None     Other Topics Concern    Not on file     Social History Narrative       FAMILY HISTORY:     Family History   Problem Relation Age of Onset    Lung Disease Mother     Cancer Father     Cancer Sister 52     breast cancer    Asthma Sister     Stroke Sister     Asthma Brother     Heart Disease Brother     Hypertension Brother            Harvinder Pope PA-C  6/14/2017

## 2017-06-28 ENCOUNTER — OFFICE VISIT (OUTPATIENT)
Dept: ORTHOPEDIC SURGERY | Age: 62
End: 2017-06-28

## 2017-06-28 VITALS
BODY MASS INDEX: 33.49 KG/M2 | HEIGHT: 65 IN | TEMPERATURE: 98.1 F | SYSTOLIC BLOOD PRESSURE: 140 MMHG | DIASTOLIC BLOOD PRESSURE: 69 MMHG | WEIGHT: 201 LBS | HEART RATE: 72 BPM

## 2017-06-28 DIAGNOSIS — Z98.890 POST-OPERATIVE STATE: ICD-10-CM

## 2017-06-28 DIAGNOSIS — L60.0 INGROWN TOENAIL: Primary | ICD-10-CM

## 2017-06-28 NOTE — PROGRESS NOTES
Patient: Edita Hylton                MRN: [id number]       SSN: [id number]  YOB: 1955           AGE: 65 y.o. SEX: female    PCP:Lea Gilliland NP    POST OP OFFICE NOTE  DOS: 6/8/17    Chief Complaint:   Chief Complaint   Patient presents with    Toe Pain     Right Great       HPI:     The patient is a 65 y.o. female who presents today for follow up 20 days s/p Right great toenail matrix excision. Patient has been PWB to the right lower extremity. Patient reports doing well other than post op pain that is controlled. Patient denies any fever, chills, chest pain, shortness of breath or calf pain. There are no new illness or injuries to report since last seen in the office. PHYSICAL EXAM:     Visit Vitals    /69    Pulse 72    Temp 98.1 °F (36.7 °C) (Oral)    Ht 5' 5\" (1.651 m)    Wt 201 lb (91.2 kg)    BMI 33.45 kg/m2       GEN:  Alert, well developed, well nourished, well appearing 65 y.o. female in no acute distress. PSYCH:  Normal affect, mood, and conduct. alert, oriented x 3 alert, oriented x 3, no dementia  M/S EXAMINATION OF: right foot  DRESSINGS: CDI  DRAINAGE: none  INCISION: Incision looks good, skin well approximated, no dehiscence, nylon sutures in place without disruption. SKIN: mild edema , no erythema, no  ecchymosis, no warmth    TENDERNESS:  mild tenderness to palpation (as expected after surgery)  NEUROVASCULAR:  grossly intact. Positive distal pulses and capillary refill. DVT ASSESSMENT:  The calf is not tender to palpation. No evidence of DVT seen on physical exam.  ROM: not tested       RADIOGRAPHS & DIAGNOSTIC STUDIES     No results found for any visits on 06/28/17. IMPRESSION:     Encounter Diagnoses     ICD-10-CM ICD-9-CM   1. Ingrown toenail L60.0 703.0   2.  Post-operative state Z98.890 V45.89       PLAN:         No orders of the defined types were placed in this encounter.                 · Dressing: sutures removed in the office today. Patient placed in post op shoe    · You may shower in 2 days, no submerging in any water, apply a gentle moisturizer daily    · Keep the current dressings on and in place. You need to keep this incision clean and dry. If you have a splint or cast on please keep this clean and dry as well. · You should expect swelling and bruising in the area over the next several days. You may elevate the right extremity on 1 pillow. Place the pillow horizontal so that no pressure is on the back of your heel. You may apply an icepack on top of the dressing to help minimize the swelling. · Keep all pets away from  any wound present in order to prevent infection. · Continue Activity modification    · Weight bearing status:  partial weight bearing to the right lower extremity    · No lifting, twisting, squatting, deep bending, driving or strenuous activity. · Perform ankle pumps with non-surgical/non-injured extremity to help reduce the risk of blood clots    · Please follow up on 7/10/17 - anticipate patient returning to work no 7/11/17              Patient expresses understanding of the plan. Patient education provided on post surgical care. REVIEW OF SYSTEMS:     Otherwise as noted in HPI     REVIEW OF SYSTEMS: All Below are Negative except: See HPI     PAST MEDICAL HISTORY:     Past Medical History:   Diagnosis Date    Asthma     childhood diagnosis; occasional inhaler use    Chest pain     Diverticulitis     Dyslipidemia     \"borderline\" per pt.  Endometriosis     GERD (gastroesophageal reflux disease)     Hypothyroidism     Normal nuclear stress test 09/14/2011    No ischemia or infarct. EF 75%. No WMA. Neg max EST w/nondiagnostic EKG changes and atypical chest discomfort at peak exercise.     Restless leg syndrome     Thyroid disease     Tobacco use disorder     ongoing       MEDICATIONS:     Current Outpatient Prescriptions   Medication Sig    acetaminophen-codeine (TYLENOL-CODEINE #3) 300-30 mg per tablet Take 1 Tab by mouth every four (4) hours as needed for Pain.  promethazine (PHENERGAN) 25 mg tablet Take 1 Tab by mouth every six (6) hours as needed for Nausea.  polyethylene glycol (MIRALAX) 17 gram packet Take 1 Packet by mouth daily.  atorvastatin (LIPITOR) 40 mg tablet TAKE 1 TABLET BY MOUTH EVERY DAY    levothyroxine (SYNTHROID) 88 mcg tablet Take 1 Tab by mouth Daily (before breakfast).  fluticasone (FLONASE) 50 mcg/actuation nasal spray USE 2 SPRAYS INTO EACH NOSTRIL EVERY DAY    valACYclovir (VALTREX) 1 gram tablet Take 1 Tab by mouth two (2) times daily as needed.  fluticasone (FLONASE) 50 mcg/actuation nasal spray 2 Sprays by Both Nostrils route daily.  azithromycin (ZITHROMAX) 250 mg tablet Take 2 tablets today, then take 1 tablet daily    Cetirizine (ZYRTEC) 10 mg cap Take  by mouth daily.  guaiFENesin-codeine (ROBITUSSIN AC) 100-10 mg/5 mL solution Take 5 mL by mouth three (3) times daily as needed for Cough. Max Daily Amount: 15 mL. Do not drive if taking this medication    ergocalciferol (ERGOCALCIFEROL) 50,000 unit capsule Take 50,000 Units by mouth.  montelukast (SINGULAIR) 10 mg tablet TAKE 1 TABLET BY MOUTH DAILY    albuterol (PROVENTIL HFA, VENTOLIN HFA, PROAIR HFA) 90 mcg/actuation inhaler Take 2 Puffs by inhalation every four (4) hours as needed for Wheezing.  acetaminophen (TYLENOL) 325 mg tablet Take 500 mg by mouth every four (4) hours as needed for Pain.  ibuprofen (MOTRIN) 800 mg tablet Take 1 Tab by mouth every six (6) hours as needed for Pain. No current facility-administered medications for this visit.         ALLERGIES:     Allergies   Allergen Reactions    Sulfa (Sulfonamide Antibiotics) Hives         PAST SURGICAL HISTORY:     Past Surgical History:   Procedure Laterality Date    FOOT/TOES SURGERY PROC UNLISTED      HX HYSTERECTOMY      HX HYSTEROSCOPY      HX OVARIAN CYST REMOVAL      teenager    HX TUBAL LIGATION      30 years ago       SOCIAL HISTORY:     Social History     Social History    Marital status:      Spouse name: N/A    Number of children: N/A    Years of education: N/A     Occupational History    Not on file.      Social History Main Topics    Smoking status: Current Every Day Smoker     Packs/day: 0.25     Years: 25.00    Smokeless tobacco: Never Used    Alcohol use Yes      Comment: social    Drug use: No    Sexual activity: Yes     Partners: Male     Birth control/ protection: None     Other Topics Concern    Not on file     Social History Narrative       FAMILY HISTORY:     Family History   Problem Relation Age of Onset    Lung Disease Mother     Cancer Father     Cancer Sister 52     breast cancer    Asthma Sister     Stroke Sister     Asthma Brother     Heart Disease Brother     Hypertension Brother            Travis [place], [place]  6/28/2017

## 2017-07-07 DIAGNOSIS — L60.0 INGROWING NAIL: Primary | ICD-10-CM

## 2017-07-10 ENCOUNTER — OFFICE VISIT (OUTPATIENT)
Dept: ORTHOPEDIC SURGERY | Age: 62
End: 2017-07-10

## 2017-07-10 VITALS
HEART RATE: 78 BPM | TEMPERATURE: 98.7 F | DIASTOLIC BLOOD PRESSURE: 58 MMHG | HEIGHT: 65 IN | BODY MASS INDEX: 33.45 KG/M2 | WEIGHT: 200.8 LBS | SYSTOLIC BLOOD PRESSURE: 120 MMHG

## 2017-07-10 DIAGNOSIS — Z98.890 POST-OPERATIVE STATE: ICD-10-CM

## 2017-07-10 DIAGNOSIS — L60.0 INGROWN TOENAIL: Primary | ICD-10-CM

## 2017-07-10 NOTE — LETTER
NOTIFICATION RETURN TO WORK / SCHOOL 
 
7/10/2017 3:11 PM 
 
Ms. Brianna Marie 
[address] [id number] To Whom It May Concern: 
 
Brianna Marie is currently under the care of [facility] Oren Gooden. She will return to work/school on: 7-11-17 with no restrictions. If there are questions or concerns please have the patient contact our office. Sincerely, Poly Solis PA-C

## 2017-07-10 NOTE — PATIENT INSTRUCTIONS
· Cover incision to prevent irritation from shoe    · Continue Activity modification    · Weight bearing status:  weight bearing to the right lower extremity    · Progress activity as tolerated    · Work note to return to work no 7/11/17    · Follow up in the office in 3 weeks or sooner as needed

## 2017-07-10 NOTE — MR AVS SNAPSHOT
Visit Information Date & Time Provider Department Dept. Phone Encounter #  
 7/10/2017  2:45  Joel Terrazas, [address] [facility] [phone] Follow-up Instructions Return in about 3 weeks (around 7/31/2017) for follow up evaluation. Upcoming Health Maintenance Date Due Hepatitis C Screening 1955 Pneumococcal 19-64 Medium Risk (1 of 1 - PPSV23) 2/5/1975 DTaP/Tdap/Td series (1 - Tdap) 2/5/1977 PAP AKA CERVICAL CYTOLOGY 2/5/1977 ZOSTER VACCINE AGE 60> 2/5/2016 INFLUENZA AGE 9 TO ADULT 8/1/2017 BREAST CANCER SCRN MAMMOGRAM 12/28/2017 COLONOSCOPY 11/28/2021 Allergies as of 7/10/2017  Review Complete On: 7/10/2017 By: Joaquina Terrazas PA-C Severity Noted Reaction Type Reactions Sulfa (Sulfonamide Antibiotics)  09/21/2011    Hives Current Immunizations  Reviewed on 10/31/2016 Name Date Influenza Vaccine 9/1/2016 Not reviewed this visit You Were Diagnosed With   
  
 Codes Comments Ingrown toenail    -  Primary ICD-10-CM: L60.0 ICD-9-CM: 703.0 Post-operative state     ICD-10-CM: A93.626 ICD-9-CM: V45.89 Vitals BP Pulse Temp Height(growth percentile) Weight(growth percentile) BMI  
 120/58 78 98.7 °F (37.1 °C) (Oral) 5' 5\" (1.651 m) 200 lb 12.8 oz (91.1 kg) 33.41 kg/m2 OB Status Smoking Status Hysterectomy Current Every Day Smoker BMI and BSA Data Body Mass Index Body Surface Area  
 33.41 kg/m 2 2.04 m 2 Preferred Pharmacy Pharmacy Name Phone [address], [place] [address] [address] [facility] [phone] Your Updated Medication List  
  
   
This list is accurate as of: 7/10/17  3:11 PM.  Always use your most recent med list.  
  
  
  
  
 acetaminophen-codeine 300-30 mg per tablet Commonly known as:  TYLENOL-CODEINE #3  
 Take 1 Tab by mouth every four (4) hours as needed for Pain. albuterol 90 mcg/actuation inhaler Commonly known as:  PROVENTIL HFA, VENTOLIN HFA, PROAIR HFA Take 2 Puffs by inhalation every four (4) hours as needed for Wheezing. atorvastatin 40 mg tablet Commonly known as:  LIPITOR  
TAKE 1 TABLET BY MOUTH EVERY DAY  
  
 azithromycin 250 mg tablet Commonly known as:  Carlito Hire Take 2 tablets today, then take 1 tablet daily  
  
 ergocalciferol 50,000 unit capsule Commonly known as:  ERGOCALCIFEROL Take 50,000 Units by mouth. * fluticasone 50 mcg/actuation nasal spray Commonly known as:  Star Serve 2 Sprays by Both Nostrils route daily. * fluticasone 50 mcg/actuation nasal spray Commonly known as:  FLONASE  
USE 2 SPRAYS INTO EACH NOSTRIL EVERY DAY  
  
 guaiFENesin-codeine 100-10 mg/5 mL solution Commonly known as:  ROBITUSSIN AC Take 5 mL by mouth three (3) times daily as needed for Cough. Max Daily Amount: 15 mL. Do not drive if taking this medication  
  
 ibuprofen 800 mg tablet Commonly known as:  MOTRIN Take 1 Tab by mouth every six (6) hours as needed for Pain.  
  
 levothyroxine 88 mcg tablet Commonly known as:  SYNTHROID Take 1 Tab by mouth Daily (before breakfast). montelukast 10 mg tablet Commonly known as:  SINGULAIR  
TAKE 1 TABLET BY MOUTH DAILY polyethylene glycol 17 gram packet Commonly known as:  Sherrie Plane Take 1 Packet by mouth daily. promethazine 25 mg tablet Commonly known as:  PHENERGAN Take 1 Tab by mouth every six (6) hours as needed for Nausea. TYLENOL 325 mg tablet Generic drug:  acetaminophen Take 500 mg by mouth every four (4) hours as needed for Pain. valACYclovir 1 gram tablet Commonly known as:  VALTREX Take 1 Tab by mouth two (2) times daily as needed. ZyrTEC 10 mg Cap Generic drug:  Cetirizine Take  by mouth daily. * Notice: This list has 2 medication(s) that are the same as other medications prescribed for you. Read the directions carefully, and ask your doctor or other care provider to review them with you. Follow-up Instructions Return in about 3 weeks (around 7/31/2017) for follow up evaluation. Patient Instructions · Cover incision to prevent irritation from shoe · Continue Activity modification · Weight bearing status:  weight bearing to the right lower extremity · Progress activity as tolerated · Work note to return to work no 7/11/17 · Follow up in the office in 3 weeks or sooner as needed 
 
  
  
 
 
 
  
Introducing [facility] SERVICES! Natali Sanchez introduces [employer] patient portal. Now you can access parts of your medical record, email your doctor's office, and request medication refills online. 1. In your internet browser, go to [email or web address] 2. Click on the First Time User? Click Here link in the Sign In box. You will see the New Member Sign Up page. 3. Enter your [employer] Access Code exactly as it appears below. You will not need to use this code after youve completed the sign-up process. If you do not sign up before the expiration date, you must request a new code. · [employer] Access Code: [email or web address] Expires: 8/21/2017  3:26 PM 
 
4. Enter the last four digits of your Social Security Number (xxxx) and Date of Birth (mm/dd/yyyy) as indicated and click Submit. You will be taken to the next sign-up page. 5. Create a [employer] ID. This will be your [employer] login ID and cannot be changed, so think of one that is secure and easy to remember. 6. Create a [employer] password. You can change your password at any time. 7. Enter your Password Reset Question and Answer. This can be used at a later time if you forget your password. 8. Enter your e-mail address.  You will receive e-mail notification when new information is available in [employer]. 9. Click Sign Up. You can now view and download portions of your medical record. 10. Click the Download Summary menu link to download a portable copy of your medical information. If you have questions, please visit the Frequently Asked Questions section of the [employer] website. Remember, [employer] is NOT to be used for urgent needs. For medical emergencies, dial 911. Now available from your iPhone and Android! Please provide this summary of care documentation to your next provider. Your primary care clinician is listed as ALTHEA HURTADO. If you have any questions after today's visit, please call [phone].

## 2017-07-10 NOTE — PROGRESS NOTES
Patient: Denver Creed                MRN: [id number]       SSN: [id number]  YOB: 1955           AGE: 65 y.o. SEX: female    PCP:Lea Pan NP    POST OP OFFICE NOTE  DOS: 6/8/17    Chief Complaint:   Chief Complaint   Patient presents with    Foot Pain     Right great toe       HPI:     The patient is a 65 y.o. female who presents today for follow up 32 days s/p Right great toenail matrix excision. Patient has been PWB to the right lower extremity. Patient reports doing well with no pain and she is ready to return to work on tomorrow. Patient denies any fever, chills, chest pain, shortness of breath or calf pain. There are no new illness or injuries to report since last seen in the office. PHYSICAL EXAM:     Visit Vitals    /58    Pulse 78    Temp 98.7 °F (37.1 °C) (Oral)    Ht 5' 5\" (1.651 m)    Wt 200 lb 12.8 oz (91.1 kg)    BMI 33.41 kg/m2       GEN:  Alert, well developed, well nourished, well appearing 65 y.o. female in no acute distress. PSYCH:  Normal affect, mood, and conduct. alert, oriented x 3 alert, oriented x 3, no dementia  M/S EXAMINATION OF: right foot  DRESSINGS: CDI  DRAINAGE: none  INCISION: Incision looks good, skin well healed. SKIN: mild edema , no erythema, no  ecchymosis, no warmth, some dryness   TENDERNESS:  mild tenderness to palpation (as expected after surgery)  NEUROVASCULAR:  grossly intact. Positive distal pulses and capillary refill. DVT ASSESSMENT:  The calf is not tender to palpation. No evidence of DVT seen on physical exam.  ROM: not tested       RADIOGRAPHS & DIAGNOSTIC STUDIES     No results found for any visits on 07/10/17. IMPRESSION:     Encounter Diagnoses     ICD-10-CM ICD-9-CM   1. Ingrown toenail L60.0 703.0   2.  Post-operative state Z98.890 V45.89       PLAN:         No orders of the defined types were placed in this encounter.                 · Cover incision to prevent irritation from shoe    · Apply a gentle moisturizer twice daily    · Weight bearing status:  weight bearing to the right lower extremity    · Progress activity as tolerated    · Work note to return to work no 7/11/17    · Follow up in the office in 3 weeks or sooner as needed                Patient expresses understanding of the plan. Patient education provided on post surgical care. REVIEW OF SYSTEMS:     Otherwise as noted in HPI     REVIEW OF SYSTEMS: All Below are Negative except: See HPI     PAST MEDICAL HISTORY:     Past Medical History:   Diagnosis Date    Asthma     childhood diagnosis; occasional inhaler use    Chest pain     Diverticulitis     Dyslipidemia     \"borderline\" per pt.  Endometriosis     GERD (gastroesophageal reflux disease)     Hypothyroidism     Normal nuclear stress test 09/14/2011    No ischemia or infarct. EF 75%. No WMA. Neg max EST w/nondiagnostic EKG changes and atypical chest discomfort at peak exercise.  Restless leg syndrome     Thyroid disease     Tobacco use disorder     ongoing       MEDICATIONS:     Current Outpatient Prescriptions   Medication Sig    acetaminophen-codeine (TYLENOL-CODEINE #3) 300-30 mg per tablet Take 1 Tab by mouth every four (4) hours as needed for Pain.  atorvastatin (LIPITOR) 40 mg tablet TAKE 1 TABLET BY MOUTH EVERY DAY    levothyroxine (SYNTHROID) 88 mcg tablet Take 1 Tab by mouth Daily (before breakfast).  fluticasone (FLONASE) 50 mcg/actuation nasal spray 2 Sprays by Both Nostrils route daily.  Cetirizine (ZYRTEC) 10 mg cap Take  by mouth daily.  albuterol (PROVENTIL HFA, VENTOLIN HFA, PROAIR HFA) 90 mcg/actuation inhaler Take 2 Puffs by inhalation every four (4) hours as needed for Wheezing.  acetaminophen (TYLENOL) 325 mg tablet Take 500 mg by mouth every four (4) hours as needed for Pain.  promethazine (PHENERGAN) 25 mg tablet Take 1 Tab by mouth every six (6) hours as needed for Nausea.     polyethylene glycol (MIRALAX) 17 gram packet Take 1 Packet by mouth daily.  fluticasone (FLONASE) 50 mcg/actuation nasal spray USE 2 SPRAYS INTO EACH NOSTRIL EVERY DAY    valACYclovir (VALTREX) 1 gram tablet Take 1 Tab by mouth two (2) times daily as needed.  azithromycin (ZITHROMAX) 250 mg tablet Take 2 tablets today, then take 1 tablet daily    guaiFENesin-codeine (ROBITUSSIN AC) 100-10 mg/5 mL solution Take 5 mL by mouth three (3) times daily as needed for Cough. Max Daily Amount: 15 mL. Do not drive if taking this medication    ergocalciferol (ERGOCALCIFEROL) 50,000 unit capsule Take 50,000 Units by mouth.  montelukast (SINGULAIR) 10 mg tablet TAKE 1 TABLET BY MOUTH DAILY    ibuprofen (MOTRIN) 800 mg tablet Take 1 Tab by mouth every six (6) hours as needed for Pain. No current facility-administered medications for this visit. ALLERGIES:     Allergies   Allergen Reactions    Sulfa (Sulfonamide Antibiotics) Hives         PAST SURGICAL HISTORY:     Past Surgical History:   Procedure Laterality Date    FOOT/TOES SURGERY PROC UNLISTED      HX HYSTERECTOMY      HX HYSTEROSCOPY      HX OVARIAN CYST REMOVAL      teenager    HX TUBAL LIGATION      30 years ago       SOCIAL HISTORY:     Social History     Social History    Marital status:      Spouse name: N/A    Number of children: N/A    Years of education: N/A     Occupational History    Not on file.      Social History Main Topics    Smoking status: Current Every Day Smoker     Packs/day: 0.25     Years: 25.00    Smokeless tobacco: Never Used    Alcohol use Yes      Comment: social    Drug use: No    Sexual activity: Yes     Partners: Male     Birth control/ protection: None     Other Topics Concern    Not on file     Social History Narrative       FAMILY HISTORY:     Family History   Problem Relation Age of Onset    Lung Disease Mother     Cancer Father     Cancer Sister 52     breast cancer    Asthma Sister     Stroke Sister     Asthma Brother     Heart Disease Brother     Hypertension Brother            Marylen Perla, PA-C  7/10/2017

## 2017-07-24 RX ORDER — ATORVASTATIN CALCIUM 40 MG/1
TABLET, FILM COATED ORAL
Qty: 90 TAB | Refills: 0 | Status: SHIPPED | OUTPATIENT
Start: 2017-07-24 | End: 2017-09-19 | Stop reason: SINTOL

## 2017-07-27 ENCOUNTER — HOSPITAL ENCOUNTER (OUTPATIENT)
Dept: LAB | Age: 62
Discharge: HOME | End: 2017-07-27
Payer: COMMERCIAL

## 2017-07-27 LAB
25(OH)D3 SERPL-MCNC: 31.6 NG/ML (ref 30–100)
T4 FREE SERPL-MCNC: 1.2 NG/DL (ref 0.7–1.5)
TSH SERPL DL<=0.05 MIU/L-ACNC: 3.21 UIU/ML (ref 0.36–3.74)

## 2017-07-27 PROCEDURE — 84439 ASSAY OF FREE THYROXINE: CPT | Performed by: INTERNAL MEDICINE

## 2017-07-27 PROCEDURE — 82306 VITAMIN D 25 HYDROXY: CPT | Performed by: INTERNAL MEDICINE

## 2017-07-27 PROCEDURE — 36415 COLL VENOUS BLD VENIPUNCTURE: CPT | Performed by: INTERNAL MEDICINE

## 2017-07-27 PROCEDURE — 84443 ASSAY THYROID STIM HORMONE: CPT | Performed by: INTERNAL MEDICINE

## 2017-09-18 RX ORDER — MONTELUKAST SODIUM 10 MG/1
TABLET ORAL
Qty: 30 TAB | Refills: 0 | Status: CANCELLED | OUTPATIENT
Start: 2017-09-18

## 2017-09-18 NOTE — TELEPHONE ENCOUNTER
Requested Prescriptions     Pending Prescriptions Disp Refills    montelukast (SINGULAIR) 10 mg tablet 30 Tab 0     [facility] on [address]

## 2017-09-19 ENCOUNTER — OFFICE VISIT (OUTPATIENT)
Dept: FAMILY MEDICINE CLINIC | Age: 62
End: 2017-09-19

## 2017-09-19 VITALS
SYSTOLIC BLOOD PRESSURE: 117 MMHG | BODY MASS INDEX: 32.99 KG/M2 | TEMPERATURE: 98.3 F | DIASTOLIC BLOOD PRESSURE: 58 MMHG | RESPIRATION RATE: 18 BRPM | HEART RATE: 78 BPM | WEIGHT: 198 LBS | OXYGEN SATURATION: 94 % | HEIGHT: 65 IN

## 2017-09-19 DIAGNOSIS — J01.00 ACUTE NON-RECURRENT MAXILLARY SINUSITIS: Primary | ICD-10-CM

## 2017-09-19 DIAGNOSIS — J45.901 ASTHMA EXACERBATION: ICD-10-CM

## 2017-09-19 RX ORDER — MONTELUKAST SODIUM 10 MG/1
10 TABLET ORAL DAILY
Qty: 30 TAB | Refills: 1 | Status: SHIPPED | OUTPATIENT
Start: 2017-09-19 | End: 2017-09-19 | Stop reason: SDUPTHER

## 2017-09-19 RX ORDER — PREDNISONE 20 MG/1
TABLET ORAL
Qty: 10 TAB | Refills: 0 | Status: SHIPPED | OUTPATIENT
Start: 2017-09-19 | End: 2017-10-23

## 2017-09-19 RX ORDER — AZITHROMYCIN 250 MG/1
TABLET, FILM COATED ORAL
Qty: 6 TAB | Refills: 0 | Status: SHIPPED | OUTPATIENT
Start: 2017-09-19 | End: 2017-09-24

## 2017-09-19 RX ORDER — CHOLECALCIFEROL TAB 125 MCG (5000 UNIT) 125 MCG
5000 TAB ORAL DAILY
COMMUNITY
End: 2022-03-10

## 2017-09-19 RX ORDER — LEVOTHYROXINE SODIUM 100 UG/1
TABLET ORAL
Refills: 3 | COMMUNITY
Start: 2017-09-13 | End: 2019-01-08 | Stop reason: SDUPTHER

## 2017-10-04 RX ORDER — VALACYCLOVIR HYDROCHLORIDE 1 G/1
1000 TABLET, FILM COATED ORAL
Qty: 20 TAB | Refills: 0 | Status: SHIPPED | OUTPATIENT
Start: 2017-10-04 | End: 2018-03-05 | Stop reason: SDUPTHER

## 2017-10-04 NOTE — TELEPHONE ENCOUNTER
Requested Prescriptions     Pending Prescriptions Disp Refills    valACYclovir (VALTREX) 1 gram tablet       Sig: Take 1 Tab by mouth two (2) times daily as needed.

## 2017-10-23 ENCOUNTER — OFFICE VISIT (OUTPATIENT)
Dept: INTERNAL MEDICINE CLINIC | Age: 62
End: 2017-10-23

## 2017-10-23 VITALS
BODY MASS INDEX: 32.99 KG/M2 | HEIGHT: 65 IN | RESPIRATION RATE: 16 BRPM | HEART RATE: 71 BPM | OXYGEN SATURATION: 96 % | SYSTOLIC BLOOD PRESSURE: 112 MMHG | DIASTOLIC BLOOD PRESSURE: 60 MMHG | WEIGHT: 198 LBS | TEMPERATURE: 98.4 F

## 2017-10-23 DIAGNOSIS — J45.909 ASTHMA, UNSPECIFIED ASTHMA SEVERITY, UNSPECIFIED WHETHER COMPLICATED, UNSPECIFIED WHETHER PERSISTENT: Primary | ICD-10-CM

## 2017-10-23 DIAGNOSIS — Z91.09 MULTIPLE ENVIRONMENTAL ALLERGIES: ICD-10-CM

## 2017-10-23 DIAGNOSIS — F17.200 TOBACCO USE DISORDER: ICD-10-CM

## 2017-10-23 DIAGNOSIS — R10.31 RIGHT LOWER QUADRANT ABDOMINAL PAIN: ICD-10-CM

## 2017-10-24 ENCOUNTER — HOSPITAL ENCOUNTER (EMERGENCY)
Age: 62
Discharge: HOME | End: 2017-10-25
Attending: EMERGENCY MEDICINE | Admitting: EMERGENCY MEDICINE
Payer: COMMERCIAL

## 2017-10-24 VITALS
SYSTOLIC BLOOD PRESSURE: 120 MMHG | TEMPERATURE: 97.6 F | OXYGEN SATURATION: 98 % | HEART RATE: 71 BPM | WEIGHT: 197.97 LBS | DIASTOLIC BLOOD PRESSURE: 59 MMHG | HEIGHT: 65 IN | BODY MASS INDEX: 32.98 KG/M2 | RESPIRATION RATE: 18 BRPM

## 2017-10-24 DIAGNOSIS — S61.412A LACERATION OF LEFT HAND WITHOUT FOREIGN BODY, INITIAL ENCOUNTER: Primary | ICD-10-CM

## 2017-10-24 PROCEDURE — 77030018836 HC SOL IRR NACL ICUM -A

## 2017-10-24 PROCEDURE — 77030002986 HC SUT PROL J&J -A

## 2017-10-24 PROCEDURE — 99283 EMERGENCY DEPT VISIT LOW MDM: CPT

## 2017-10-24 PROCEDURE — 75810000293 HC SIMP/SUPERF WND  RPR

## 2017-10-24 NOTE — LETTER
[address] [place] [address] [facility] EMERGENCY DEPT 
[address] [facility] [id number] [phone] Work/School Note Date: 10/24/2017 To Whom It May concern: 
 
Brianna Marie was seen and treated today in the emergency room by the following provider(s): 
Attending Provider: Cristofer Ly MD 
Physician Assistant: Bella Ruelas PA-C. Brianna Marie may return to work on 10/26/17. Sincerely, Bella Ruelas PA-C

## 2017-10-24 NOTE — PROGRESS NOTES
Jeff Alfaro is a 65 y.o. female presenting today for Well Woman (6 month follow up)  . HPI:  Kiera Gross Self presents to the office today for well woman check. Patient noted she is feeling fine. She is negative for chest pain, dyspnea, palpitations, polyuria or polydipsia. Review of Systems   Constitutional: Negative for malaise/fatigue. Respiratory: Negative for cough. Cardiovascular: Negative for chest pain and palpitations. Gastrointestinal: Negative for abdominal pain, diarrhea, nausea and vomiting. Genitourinary: Negative for dysuria. Musculoskeletal: Negative for myalgias. Neurological: Negative for dizziness and headaches. Allergies   Allergen Reactions    Atorvastatin Other (comments)     Leg cramps and swelling    Sulfa (Sulfonamide Antibiotics) Hives       Current Outpatient Prescriptions   Medication Sig Dispense Refill    valACYclovir (VALTREX) 1 gram tablet Take 1 Tab by mouth two (2) times daily as needed. 20 Tab 0    cholecalciferol, VITAMIN D3, (VITAMIN D3) 5,000 unit tab tablet Take  by mouth daily.  levothyroxine (SYNTHROID) 100 mcg tablet TK 1 T PO QD  3    montelukast (SINGULAIR) 10 mg tablet TAKE 1 TABLET BY MOUTH DAILY 90 Tab 0    fluticasone (FLONASE) 50 mcg/actuation nasal spray 2 Sprays by Both Nostrils route daily. 1 Bottle 3    albuterol (PROVENTIL HFA, VENTOLIN HFA, PROAIR HFA) 90 mcg/actuation inhaler Take 2 Puffs by inhalation every four (4) hours as needed for Wheezing. 1 Inhaler 2       Past Medical History:   Diagnosis Date    Asthma     childhood diagnosis; occasional inhaler use    Chest pain     Diverticulitis     Dyslipidemia     \"borderline\" per pt.  Endometriosis     GERD (gastroesophageal reflux disease)     Hypothyroidism     Normal nuclear stress test 09/14/2011    No ischemia or infarct. EF 75%. No WMA. Neg max EST w/nondiagnostic EKG changes and atypical chest discomfort at peak exercise.     Restless leg syndrome     Thyroid disease     Tobacco use disorder     ongoing       Past Surgical History:   Procedure Laterality Date    FOOT/TOES SURGERY PROC UNLISTED      HX HYSTERECTOMY      HX HYSTEROSCOPY      HX OVARIAN CYST REMOVAL      teenager    HX TUBAL LIGATION      30 years ago       Social History     Social History    Marital status:      Spouse name: N/A    Number of children: N/A    Years of education: N/A     Occupational History    Not on file. Social History Main Topics    Smoking status: Current Every Day Smoker     Packs/day: 0.25     Years: 25.00    Smokeless tobacco: Never Used    Alcohol use Yes      Comment: social    Drug use: No    Sexual activity: Yes     Partners: Male     Birth control/ protection: None     Other Topics Concern    Not on file     Social History Narrative       Patient does not have an advanced directive on file    Vitals:    10/23/17 1613   BP: 112/60   Pulse: 71   Resp: 16   Temp: 98.4 °F (36.9 °C)   TempSrc: Tympanic   SpO2: 96%   Weight: 198 lb (89.8 kg)   Height: 5' 5\" (1.651 m)   PainSc:   0 - No pain       Physical Exam   Constitutional: No distress. HENT:   Head: Normocephalic. Neck: Normal range of motion. Neck supple. Cardiovascular: Normal rate, regular rhythm and normal heart sounds. Pulmonary/Chest: Effort normal and breath sounds normal. She exhibits no tenderness. Abdominal: Soft. Bowel sounds are normal. There is tenderness in the right lower quadrant. Musculoskeletal: Normal range of motion. Lymphadenopathy:     She has no cervical adenopathy. Neurological: She is alert. Skin: Skin is warm and dry. Nursing note and vitals reviewed.       Hospital Outpatient Visit on 07/27/2017   Component Date Value Ref Range Status    T4, Free 07/27/2017 1.2  0.7 - 1.5 NG/DL Final    TSH 07/27/2017 3.21  0.36 - 3.74 uIU/mL Final    Vitamin D 25-Hydroxy 07/27/2017 31.6  30 - 100 ng/mL Final    Comment: (NOTE)  Deficiency               <20 ng/mL  Insufficiency          20-30 ng/mL  Sufficient             ng/mL  Possible toxicity       >100 ng/mL    The Method used is Siemens Advia Centaur currently standardized to a   Center of Disease Control and Prevention (CDC) certified reference   [facility]. Samples containing fluorescein dye can produce falsely   elevated values when tested with the ADVIA Centaur Vitamin D Assay. It is recommended that results in the toxic range, >100 ng/mL, be   retested 72 hours post fluorescein exposure. .No results found for any visits on 10/23/17. Assessment / Plan:      ICD-10-CM ICD-9-CM    1. Asthma, unspecified asthma severity, unspecified whether complicated, unspecified whether persistent J45.909 493.90    2. Tobacco use disorder H17.388 220.1 METABOLIC PANEL, COMPREHENSIVE      CBC WITH AUTOMATED DIFF      LIPID PANEL      URINALYSIS W/ RFLX MICROSCOPIC   3. Right lower quadrant abdominal pain R10.31 789.03 CT ABD PELV W WO CONT   4. Multiple environmental allergies Z91.09 V15.09 REFERRAL TO ENT-OTOLARYNGOLOGY     Asthma/allergies- referral to ENT  Patient noted she smoke 1-4 ciggs/day  CT ABD/Pelvis- RLQ pain  Fasting labs ordered  Hyperlipidemia- patient declined treatement  F/u in 6 months      Follow-up Disposition:  Return in about 6 months (around 4/23/2018). I asked the patient if she  had any questions and answered her  questions.   The patient stated that she understands the treatment plan and agrees with the treatment plan

## 2017-10-25 NOTE — ED PROVIDER NOTES
HPI Comments: 66 yo F c/o laceration to palm of left hand. States she was trying to take brownies out of a pan with a knife and it slipped and cut her hand. Admits to numbness to palmar aspect of fingers 2-4. Bleeding controlled PTA. Tetanus up to date. No other complaints. Patient is a 65 y.o. female presenting with skin laceration. Laceration    Associated symptoms include numbness. Past Medical History:   Diagnosis Date    Asthma     childhood diagnosis; occasional inhaler use    Chest pain     Diverticulitis     Dyslipidemia     \"borderline\" per pt.  Endometriosis     GERD (gastroesophageal reflux disease)     Hypothyroidism     Normal nuclear stress test 09/14/2011    No ischemia or infarct. EF 75%. No WMA. Neg max EST w/nondiagnostic EKG changes and atypical chest discomfort at peak exercise.  Restless leg syndrome     Thyroid disease     Tobacco use disorder     ongoing       Past Surgical History:   Procedure Laterality Date    FOOT/TOES SURGERY PROC UNLISTED      HX HYSTERECTOMY      HX HYSTEROSCOPY      HX OVARIAN CYST REMOVAL      teenager    HX TUBAL LIGATION      30 years ago         Family History:   Problem Relation Age of Onset    Lung Disease Mother     Cancer Father     Cancer Sister 52     breast cancer    Asthma Sister     Stroke Sister     Asthma Brother     Heart Disease Brother     Hypertension Brother        Social History     Social History    Marital status:      Spouse name: N/A    Number of children: N/A    Years of education: N/A     Occupational History    Not on file.      Social History Main Topics    Smoking status: Current Every Day Smoker     Packs/day: 0.25     Years: 25.00    Smokeless tobacco: Never Used    Alcohol use Yes      Comment: social    Drug use: No    Sexual activity: Yes     Partners: Male     Birth control/ protection: None     Other Topics Concern    Not on file     Social History Narrative ALLERGIES: Atorvastatin and Sulfa (sulfonamide antibiotics)    Review of Systems   Skin: Positive for wound. Neurological: Positive for numbness. All other systems reviewed and are negative. Vitals:    10/24/17 2204 10/24/17 2300   BP: 111/62 120/59   Pulse: 71 71   Resp: 19 18   Temp: 97.6 °F (36.4 °C)    SpO2: 99% 98%   Weight: 89.8 kg (197 lb 15.6 oz)    Height: 5' 5\" (1.651 m)             Physical Exam   Constitutional: She is oriented to person, place, and time. She appears well-developed and well-nourished. No distress. HENT:   Head: Normocephalic and atraumatic. Eyes: Conjunctivae are normal.   Neck: Normal range of motion. Neck supple. Cardiovascular: Normal rate, regular rhythm and normal heart sounds. Pulmonary/Chest: Effort normal and breath sounds normal. No respiratory distress. She has no wheezes. She has no rales. Musculoskeletal: Normal range of motion. FROM of fingers of left hand. Neurological: She is alert and oriented to person, place, and time. Decreased sensation to palmar aspect of fingers 2-5. Skin: Skin is warm and dry. 1.5 cm laceration to center of palm of left hand. No active bleeding. Psychiatric: She has a normal mood and affect. Her behavior is normal. Judgment and thought content normal.   Nursing note and vitals reviewed. MDM  Number of Diagnoses or Management Options  Laceration of left hand without foreign body, initial encounter:     ED Course       Wound Repair  Date/Time: 10/24/2017 11:59 PM  Performed by: [id number] provider: christa  Preparation: skin prepped with Shur-Clens and sterile field established  Pre-procedure re-eval: Immediately prior to the procedure, the patient was reevaluated and found suitable for the planned procedure and any planned medications. Time out: Immediately prior to the procedure a time out was called to verify the correct patient, procedure, equipment, staff and marking as appropriate. .  Location details: left hand  Wound length:2.5 cm or less  Anesthesia: local infiltration    Anesthesia:  Local Anesthetic: lidocaine 1% without epinephrine  Anesthetic total: 3 mL  Foreign bodies: no foreign bodies  Irrigation solution: saline  Irrigation method: syringe  Debridement: none  Skin closure: Prolene (5-0)  Number of sutures: 2  Technique: interrupted  Approximation: close  Dressing: antibiotic ointment  Patient tolerance: Patient tolerated the procedure well with no immediate complications  My total time at bedside, performing this procedure was 16-30 minutes. -------------------------------------------------------------------------------------------------------------------     EKG INTERPRETATIONS:      RADIOLOGY RESULTS:   No orders to display       LABORATORY RESULTS:  No results found for this or any previous visit (from the past 12 hour(s)). CONSULTATIONS:        PROGRESS NOTES:    11:59 PM Uneventful laceration repair. Numbness resolving while in ED. Will refer to ortho for further eval. Existing pt of Dr. Austin Coles so may f/u with him. Return precautions given. Lengthy D/W pt regarding possible worsening of pt's condition, need for follow up and strict ED return instructions for any worsening symptoms. DISPOSITION:  ED DIAGNOSIS & DISPOSITION INFORMATION  Diagnosis:   1.  Laceration of left hand without foreign body, initial encounter          Disposition: home    Follow-up Information     Follow up With Details Comments Contact Info    Ron Art MD Call To make a follow up appointment [facility].      [facility] EMERGENCY DEPT  Immediately if symptoms worsen [address] [place] [id number]  [phone]          Patient's Medications   Start Taking    No medications on file   Continue Taking    ALBUTEROL (PROVENTIL HFA, VENTOLIN HFA, PROAIR HFA) 90 MCG/ACTUATION INHALER    Take 2 Puffs by inhalation every four (4) hours as needed for Wheezing. CHOLECALCIFEROL, VITAMIN D3, (VITAMIN D3) 5,000 UNIT TAB TABLET    Take  by mouth daily. FLUTICASONE (FLONASE) 50 MCG/ACTUATION NASAL SPRAY    2 Sprays by Both Nostrils route daily. LEVOTHYROXINE (SYNTHROID) 100 MCG TABLET    TK 1 T PO QD    MONTELUKAST (SINGULAIR) 10 MG TABLET    TAKE 1 TABLET BY MOUTH DAILY    VALACYCLOVIR (VALTREX) 1 GRAM TABLET    Take 1 Tab by mouth two (2) times daily as needed.    These Medications have changed    No medications on file   Stop Taking    No medications on file

## 2017-10-25 NOTE — ED TRIAGE NOTES
Pt c/o left hand laceration, pt claimed that she was cooking tonight when she accidentally sliced her left hand, bleeding controlled at the moment

## 2017-10-25 NOTE — DISCHARGE INSTRUCTIONS

## 2017-10-27 ENCOUNTER — OFFICE VISIT (OUTPATIENT)
Dept: ORTHOPEDIC SURGERY | Facility: CLINIC | Age: 62
End: 2017-10-27

## 2017-10-27 VITALS
BODY MASS INDEX: 32.72 KG/M2 | OXYGEN SATURATION: 96 % | TEMPERATURE: 96.4 F | RESPIRATION RATE: 18 BRPM | SYSTOLIC BLOOD PRESSURE: 141 MMHG | DIASTOLIC BLOOD PRESSURE: 61 MMHG | HEIGHT: 65 IN | HEART RATE: 77 BPM | WEIGHT: 196.4 LBS

## 2017-10-27 DIAGNOSIS — S61.412A LACERATION OF LEFT HAND WITHOUT FOREIGN BODY, INITIAL ENCOUNTER: Primary | ICD-10-CM

## 2017-10-27 NOTE — PATIENT INSTRUCTIONS
Cuts on the Hand Closed With Stitches: Care Instructions  Your Care Instructions    A cut on your hand can be on your fingers, your thumb, or the front or back of your hand. Sometimes a cut can injure the tendons, blood vessels, or nerves of your hand. The doctor used stitches to close the cut. Using stitches also helps the cut heal and reduces scarring. The doctor may have given you a splint to help prevent you from moving your hand, fingers, or thumb. If the cut went deep and through the skin, the doctor put in two layers of stitches. The deeper layer brings the deep part of the cut together. These stitches will dissolve and don't need to be removed. The stitches in the upper layer are the ones you see on the cut. You will probably have a bandage. You will need to have the stitches removed, usually in 7 to 14 days. The doctor may suggest that you see a hand specialist if the cut is very deep or if you have trouble moving your fingers or have less feeling in your hand. The doctor has checked you carefully, but problems can develop later. If you notice any problems or new symptoms, get medical treatment right away. Follow-up care is a key part of your treatment and safety. Be sure to make and go to all appointments, and call your doctor if you are having problems. It's also a good idea to know your test results and keep a list of the medicines you take. How can you care for yourself at home? · Keep the cut dry for the first 24 to 48 hours. After this, you can shower if your doctor okays it. Pat the cut dry. · Don't soak the cut, such as in a bathtub. Your doctor will tell you when it's safe to get the cut wet. · If your doctor told you how to care for your cut, follow your doctor's instructions. If you did not get instructions, follow this general advice:  ¨ After the first 24 to 48 hours, wash around the cut with clean water 2 times a day.  Don't use hydrogen peroxide or alcohol, which can slow healing. ¨ You may cover the cut with a thin layer of petroleum jelly, such as Vaseline, and a nonstick bandage. ¨ Apply more petroleum jelly and replace the bandage as needed. · Prop up the sore hand on a pillow anytime you sit or lie down during the next 3 days. Try to keep it above the level of your heart. This will help reduce swelling. · Avoid any activity that could cause your cut to reopen. · Do not remove the stitches on your own. Your doctor will tell you when to come back to have the stitches removed. · Be safe with medicines. Take pain medicines exactly as directed. ¨ If the doctor gave you a prescription medicine for pain, take it as prescribed. ¨ If you are not taking a prescription pain medicine, ask your doctor if you can take an over-the-counter medicine. When should you call for help? Call your doctor now or seek immediate medical care if:  ? · You have new pain, or your pain gets worse. ? · The skin near the cut is cold or pale or changes color. ? · You have tingling, weakness, or numbness near the cut.   ? · The cut starts to bleed, and blood soaks through the bandage. Oozing small amounts of blood is normal.   ? · You have trouble moving the area of the hand near the cut.   ? · You have symptoms of infection, such as:  ¨ Increased pain, swelling, warmth, or redness around the cut. ¨ Red streaks leading from the cut. ¨ Pus draining from the cut. ¨ A fever. ? Watch closely for changes in your health, and be sure to contact your doctor if:  ? · You do not get better as expected. Where can you learn more? Go to http://michael-jessi.info/. Enter T250 in the search box to learn more about \"Cuts on the Hand Closed With Stitches: Care Instructions. \"  Current as of: March 20, 2017  Content Version: 11.4  © 1013-0535 [employer].  Care instructions adapted under license by [employer] (which disclaims liability or warranty for this information). If you have questions about a medical condition or this instruction, always ask your healthcare professional. [place] [address] any warranty or liability for your use of this information.

## 2017-10-27 NOTE — MR AVS SNAPSHOT
Visit Information Date & Time Provider Department Dept. Phone Encounter #  
 10/27/2017  8:30 AM Michael Reaves MD [facility] [id number] Follow-up Instructions Return in about 1 week (around 11/3/2017). Follow-up and Disposition History Upcoming Health Maintenance Date Due Hepatitis C Screening 1955 Pneumococcal 19-64 Medium Risk (1 of 1 - PPSV23) 2/5/1975 PAP AKA CERVICAL CYTOLOGY 2/5/1977 ZOSTER VACCINE AGE 60> 12/5/2015 BREAST CANCER SCRN MAMMOGRAM 12/28/2017 COLONOSCOPY 11/28/2021 DTaP/Tdap/Td series (2 - Td) 6/4/2022 Allergies as of 10/27/2017  Review Complete On: 10/27/2017 By: Michael Reaves MD  
  
 Severity Noted Reaction Type Reactions Atorvastatin Medium 10/23/2017    Other (comments) Leg cramps and swelling Sulfa (Sulfonamide Antibiotics)  09/21/2011    Hives Current Immunizations  Reviewed on 10/23/2017 Name Date Influenza Vaccine 9/1/2017, 9/1/2016 Tdap 6/4/2012 Not reviewed this visit You Were Diagnosed With   
  
 Codes Comments Laceration of left hand without foreign body, initial encounter    -  Primary ICD-10-CM: O34.142H ICD-9-CM: 147. 0 Vitals BP Pulse Temp Resp Height(growth percentile) Weight(growth percentile) 141/61 77 96.4 °F (35.8 °C) (Oral) 18 5' 5\" (1.651 m) 196 lb 6.4 oz (89.1 kg) SpO2 BMI OB Status Smoking Status 96% 32.68 kg/m2 Hysterectomy Current Every Day Smoker BMI and BSA Data Body Mass Index Body Surface Area  
 32.68 kg/m 2 2.02 m 2 Preferred Pharmacy Pharmacy Name Phone [address], [place] [address] [place] [address] [phone] [facility] [phone] Your Updated Medication List  
  
   
This list is accurate as of: 10/27/17  9:06 AM.  Always use your most recent med list.  
  
  
  
  
 albuterol 90 mcg/actuation inhaler Commonly known as:  PROVENTIL HFA, VENTOLIN HFA, PROAIR HFA Take 2 Puffs by inhalation every four (4) hours as needed for Wheezing. cholecalciferol (VITAMIN D3) 5,000 unit Tab tablet Commonly known as:  VITAMIN D3 Take  by mouth daily. fluticasone 50 mcg/actuation nasal spray Commonly known as:  Alric Eaves 2 Sprays by Both Nostrils route daily. levothyroxine 100 mcg tablet Commonly known as:  SYNTHROID TK 1 T PO QD  
  
 montelukast 10 mg tablet Commonly known as:  SINGULAIR  
TAKE 1 TABLET BY MOUTH DAILY  
  
 valACYclovir 1 gram tablet Commonly known as:  VALTREX Take 1 Tab by mouth two (2) times daily as needed. Follow-up Instructions Return in about 1 week (around 11/3/2017). Patient Instructions Cuts on the Hand Closed With Stitches: Care Instructions Your Care Instructions A cut on your hand can be on your fingers, your thumb, or the front or back of your hand. Sometimes a cut can injure the tendons, blood vessels, or nerves of your hand. The doctor used stitches to close the cut. Using stitches also helps the cut heal and reduces scarring. The doctor may have given you a splint to help prevent you from moving your hand, fingers, or thumb. If the cut went deep and through the skin, the doctor put in two layers of stitches. The deeper layer brings the deep part of the cut together. These stitches will dissolve and don't need to be removed. The stitches in the upper layer are the ones you see on the cut. You will probably have a bandage. You will need to have the stitches removed, usually in 7 to 14 days. The doctor may suggest that you see a hand specialist if the cut is very deep or if you have trouble moving your fingers or have less feeling in your hand. The doctor has checked you carefully, but problems can develop later. If you notice any problems or new symptoms, get medical treatment right away. Follow-up care is a key part of your treatment and safety. Be sure to make and go to all appointments, and call your doctor if you are having problems. It's also a good idea to know your test results and keep a list of the medicines you take. How can you care for yourself at home? · Keep the cut dry for the first 24 to 48 hours. After this, you can shower if your doctor okays it. Pat the cut dry. · Don't soak the cut, such as in a bathtub. Your doctor will tell you when it's safe to get the cut wet. · If your doctor told you how to care for your cut, follow your doctor's instructions. If you did not get instructions, follow this general advice: ¨ After the first 24 to 48 hours, wash around the cut with clean water 2 times a day. Don't use hydrogen peroxide or alcohol, which can slow healing. ¨ You may cover the cut with a thin layer of petroleum jelly, such as Vaseline, and a nonstick bandage. ¨ Apply more petroleum jelly and replace the bandage as needed. · Prop up the sore hand on a pillow anytime you sit or lie down during the next 3 days. Try to keep it above the level of your heart. This will help reduce swelling. · Avoid any activity that could cause your cut to reopen. · Do not remove the stitches on your own. Your doctor will tell you when to come back to have the stitches removed. · Be safe with medicines. Take pain medicines exactly as directed. ¨ If the doctor gave you a prescription medicine for pain, take it as prescribed. ¨ If you are not taking a prescription pain medicine, ask your doctor if you can take an over-the-counter medicine. When should you call for help? Call your doctor now or seek immediate medical care if: 
? · You have new pain, or your pain gets worse. ? · The skin near the cut is cold or pale or changes color. ? · You have tingling, weakness, or numbness near the cut.  
? · The cut starts to bleed, and blood soaks through the bandage.  Oozing small amounts of blood is normal.  
? · You have trouble moving the area of the hand near the cut.  
? · You have symptoms of infection, such as: 
¨ Increased pain, swelling, warmth, or redness around the cut. ¨ Red streaks leading from the cut. ¨ Pus draining from the cut. ¨ A fever. ? Watch closely for changes in your health, and be sure to contact your doctor if: 
? · You do not get better as expected. Where can you learn more? Go to http://michael-jessi.info/. Enter T250 in the search box to learn more about \"Cuts on the Hand Closed With Stitches: Care Instructions. \" Current as of: March 20, 2017 Content Version: 11.4 © 5633-4988 [employer]. Care instructions adapted under license by [employer] (which disclaims liability or warranty for this information). If you have questions about a medical condition or this instruction, always ask your healthcare professional. [place] [address] any warranty or liability for your use of this information. Patient Instructions History Introducing [facility]! Jaskaran Hernandez introduces [employer] patient portal. Now you can access parts of your medical record, email your doctor's office, and request medication refills online. 1. In your internet browser, go to [email or web address] 2. Click on the First Time User? Click Here link in the Sign In box. You will see the New Member Sign Up page. 3. Enter your [employer] Access Code exactly as it appears below. You will not need to use this code after youve completed the sign-up process. If you do not sign up before the expiration date, you must request a new code. · [employer] Access Code: [email or web address] Expires: 1/22/2018  1:46 PM 
 
4. Enter the last four digits of your Social Security Number (xxxx) and Date of Birth (mm/dd/yyyy) as indicated and click Submit. You will be taken to the next sign-up page. 5. Create a [employer] ID. This will be your [employer] login ID and cannot be changed, so think of one that is secure and easy to remember. 6. Create a [employer] password. You can change your password at any time. 7. Enter your Password Reset Question and Answer. This can be used at a later time if you forget your password. 8. Enter your e-mail address. You will receive e-mail notification when new information is available in [address] 9. Click Sign Up. You can now view and download portions of your medical record. 10. Click the Download Summary menu link to download a portable copy of your medical information. If you have questions, please visit the Frequently Asked Questions section of the [employer] website. Remember, [employer] is NOT to be used for urgent needs. For medical emergencies, dial 911. Now available from your iPhone and Android! Please provide this summary of care documentation to your next provider. Your primary care clinician is listed as ALTHEA HURTADO. If you have any questions after today's visit, please call [phone].

## 2017-10-27 NOTE — LETTER
NOTIFICATION RETURN TO WORK  
 
10/27/2017 8:57 AM 
 
Ms. Brianna Marie 
[address] [facility] [address] To Whom It May Concern: 
 
Brianna Marie is currently under the care of [address] [place] [address]. She will return to work on: Wed. 11-1-17.--no restrictions. She is to remain out of work until then. If there are questions or concerns please have the patient contact our office.  
 
 
 
Sincerely, 
 
 
Nicholas Booker MD
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
## 2017-11-27 ENCOUNTER — HOSPITAL ENCOUNTER (OUTPATIENT)
Dept: CT IMAGING | Age: 62
Discharge: HOME | End: 2017-11-27
Attending: NURSE PRACTITIONER

## 2017-11-27 DIAGNOSIS — R10.31 RIGHT LOWER QUADRANT ABDOMINAL PAIN: ICD-10-CM

## 2017-11-28 ENCOUNTER — HOSPITAL ENCOUNTER (OUTPATIENT)
Dept: CT IMAGING | Age: 62
Discharge: HOME | End: 2017-11-28
Attending: NURSE PRACTITIONER
Payer: COMMERCIAL

## 2017-11-28 DIAGNOSIS — R10.31 RIGHT LOWER QUADRANT ABDOMINAL PAIN: ICD-10-CM

## 2017-11-28 PROCEDURE — 74011636320 HC RX REV CODE- 636/320: Performed by: NURSE PRACTITIONER

## 2017-11-28 PROCEDURE — 74177 CT ABD & PELVIS W/CONTRAST: CPT

## 2017-11-28 RX ADMIN — IOPAMIDOL 100 ML: 612 INJECTION, SOLUTION INTRAVENOUS at 09:24

## 2017-11-29 ENCOUNTER — TELEPHONE (OUTPATIENT)
Dept: INTERNAL MEDICINE CLINIC | Age: 62
End: 2017-11-29

## 2017-12-01 ENCOUNTER — TELEPHONE (OUTPATIENT)
Dept: INTERNAL MEDICINE CLINIC | Age: 62
End: 2017-12-01

## 2017-12-01 NOTE — TELEPHONE ENCOUNTER
Contacted Brianna Marie and informed Her of lab results per Erica Jackson NP's request. Darlyn Qureshi Self expressed understanding. She did request that Lawguerline Saunders Re assess liver portion of imaging just for reassurance that the cysts do not need to be addressed. Erica Jackson NP made aware.

## 2017-12-18 RX ORDER — MONTELUKAST SODIUM 10 MG/1
TABLET ORAL
Qty: 90 TAB | Refills: 0 | Status: SHIPPED | OUTPATIENT
Start: 2017-12-18 | End: 2018-08-05 | Stop reason: SDUPTHER

## 2017-12-27 ENCOUNTER — HOSPITAL ENCOUNTER (OUTPATIENT)
Dept: MAMMOGRAPHY | Age: 62
Discharge: HOME | End: 2017-12-27
Attending: INTERNAL MEDICINE
Payer: COMMERCIAL

## 2017-12-27 DIAGNOSIS — Z12.31 ENCOUNTER FOR SCREENING MAMMOGRAM FOR BREAST CANCER: ICD-10-CM

## 2017-12-27 PROCEDURE — 77063 BREAST TOMOSYNTHESIS BI: CPT

## 2018-02-16 ENCOUNTER — OFFICE VISIT (OUTPATIENT)
Dept: INTERNAL MEDICINE CLINIC | Age: 63
End: 2018-02-16

## 2018-02-16 VITALS
DIASTOLIC BLOOD PRESSURE: 70 MMHG | HEART RATE: 95 BPM | RESPIRATION RATE: 16 BRPM | TEMPERATURE: 98.8 F | OXYGEN SATURATION: 97 % | HEIGHT: 65 IN | SYSTOLIC BLOOD PRESSURE: 118 MMHG

## 2018-02-16 DIAGNOSIS — F17.200 TOBACCO USE DISORDER: ICD-10-CM

## 2018-02-16 DIAGNOSIS — R68.89 FLU-LIKE SYMPTOMS: ICD-10-CM

## 2018-02-16 DIAGNOSIS — J06.9 UPPER RESPIRATORY TRACT INFECTION, UNSPECIFIED TYPE: Primary | ICD-10-CM

## 2018-02-16 LAB
QUICKVUE INFLUENZA TEST: NEGATIVE
QUICKVUE INFLUENZA TEST: NEGATIVE
VALID INTERNAL CONTROL?: YES
VALID INTERNAL CONTROL?: YES

## 2018-02-16 RX ORDER — AZITHROMYCIN 250 MG/1
TABLET, FILM COATED ORAL
Qty: 6 TAB | Refills: 0 | Status: SHIPPED | OUTPATIENT
Start: 2018-02-16 | End: 2018-03-05 | Stop reason: ALTCHOICE

## 2018-02-16 RX ORDER — CODEINE PHOSPHATE AND GUAIFENESIN 10; 100 MG/5ML; MG/5ML
5 SOLUTION ORAL
Qty: 118 ML | Refills: 0 | Status: SHIPPED | OUTPATIENT
Start: 2018-02-16 | End: 2018-12-03

## 2018-02-16 NOTE — PROGRESS NOTES
Brianna Marie is a 59 y.o. female presenting today for Nasal Congestion and Generalized Body Aches  . HPI:  Matheus Marie presents to the office today for nasal congestion, body aches, non-productive, ear pain and headache. Review of Systems   Constitutional: Negative for fever. HENT: Positive for congestion, ear pain and sinus pain. Eyes: Negative for blurred vision and double vision. Respiratory: Positive for cough. Cardiovascular: Negative for chest pain and palpitations. Allergies   Allergen Reactions    Atorvastatin Other (comments)     Leg cramps and swelling    Sulfa (Sulfonamide Antibiotics) Hives       Current Outpatient Prescriptions   Medication Sig Dispense Refill    azithromycin (ZITHROMAX) 250 mg tablet Take 2 tablets today, then take 1 tablet daily 6 Tab 0    guaiFENesin-codeine (ROBITUSSIN AC) 100-10 mg/5 mL solution Take 5 mL by mouth three (3) times daily as needed for Cough. Max Daily Amount: 15 mL. Do not drive if taking this medication 118 mL 0    montelukast (SINGULAIR) 10 mg tablet TAKE 1 TABLET BY MOUTH DAILY 90 Tab 0    valACYclovir (VALTREX) 1 gram tablet Take 1 Tab by mouth two (2) times daily as needed. 20 Tab 0    cholecalciferol, VITAMIN D3, (VITAMIN D3) 5,000 unit tab tablet Take  by mouth daily.  levothyroxine (SYNTHROID) 100 mcg tablet TK 1 T PO QD  3    fluticasone (FLONASE) 50 mcg/actuation nasal spray 2 Sprays by Both Nostrils route daily. 1 Bottle 3    albuterol (PROVENTIL HFA, VENTOLIN HFA, PROAIR HFA) 90 mcg/actuation inhaler Take 2 Puffs by inhalation every four (4) hours as needed for Wheezing. 1 Inhaler 2       Past Medical History:   Diagnosis Date    Asthma     childhood diagnosis; occasional inhaler use    Chest pain     Diverticulitis     Dyslipidemia     \"borderline\" per pt.  Endometriosis     GERD (gastroesophageal reflux disease)     Hypothyroidism     Normal nuclear stress test 09/14/2011    No ischemia or infarct.   EF 75%.  No WMA. Neg max EST w/nondiagnostic EKG changes and atypical chest discomfort at peak exercise.  Restless leg syndrome     Thyroid disease     Tobacco use disorder     ongoing       Past Surgical History:   Procedure Laterality Date    FOOT/TOES SURGERY PROC UNLISTED      HX HYSTERECTOMY      HX HYSTEROSCOPY      HX OVARIAN CYST REMOVAL      teenager    HX TUBAL LIGATION      30 years ago       Social History     Social History    Marital status:      Spouse name: N/A    Number of children: N/A    Years of education: N/A     Occupational History    Not on file. Social History Main Topics    Smoking status: Current Every Day Smoker     Packs/day: 0.25     Years: 25.00    Smokeless tobacco: Never Used    Alcohol use Yes      Comment: social    Drug use: No    Sexual activity: Yes     Partners: Male     Birth control/ protection: None     Other Topics Concern    Not on file     Social History Narrative       Patient does not have an advanced directive on file    Vitals:    02/16/18 1529   BP: 118/70   Pulse: 95   Resp: 16   Temp: 98.8 °F (37.1 °C)   TempSrc: Tympanic   SpO2: 97%   Height: 5' 5\" (1.651 m)   PainSc:   5   PainLoc: Back       Physical Exam   Constitutional: No distress. HENT:   Right Ear: External ear normal.   Left Ear: External ear normal.   Mouth/Throat: No oropharyngeal exudate. Cardiovascular: Normal rate, regular rhythm and normal heart sounds. Pulmonary/Chest: Effort normal and breath sounds normal.   Lymphadenopathy:     She has no cervical adenopathy. Nursing note and vitals reviewed. No visits with results within 3 Month(s) from this visit.   Latest known visit with results is:    Hospital Outpatient Visit on 07/27/2017   Component Date Value Ref Range Status    T4, Free 07/27/2017 1.2  0.7 - 1.5 NG/DL Final    TSH 07/27/2017 3.21  0.36 - 3.74 uIU/mL Final    Vitamin D 25-Hydroxy 07/27/2017 31.6  30 - 100 ng/mL Final    Comment: (NOTE)  Deficiency               <20 ng/mL  Insufficiency          20-30 ng/mL  Sufficient             ng/mL  Possible toxicity       >100 ng/mL    The Method used is Siemens Advia Centaur currently standardized to a   Center of Disease Control and Prevention (CDC) certified reference   [facility]. Samples containing fluorescein dye can produce falsely   elevated values when tested with the ADVIA Centaur Vitamin D Assay. It is recommended that results in the toxic range, >100 ng/mL, be   retested 72 hours post fluorescein exposure. .No results found for any visits on 02/16/18. Assessment / Plan:      ICD-10-CM ICD-9-CM    1. Upper respiratory tract infection, unspecified type J06.9 465.9 azithromycin (ZITHROMAX) 250 mg tablet      guaiFENesin-codeine (ROBITUSSIN AC) 100-10 mg/5 mL solution   2. Flu-like symptoms R68.89 780.99 AMB POC RAPID INFLUENZA TEST      AMB POC RAPID INFLUENZA TEST   3. Tobacco use disorder F17.200 305.1        Zpak rx  Cheratuusin rx  Influenza negative    The patient was counseled on the dangers of tobacco use, and was advised to quit. Reviewed strategies to maximize success, including removing cigarettes and smoking materials from environment. Follow-up Disposition:  Return if symptoms worsen or fail to improve. I asked the patient if she  had any questions and answered her  questions.   The patient stated that she understands the treatment plan and agrees with the treatment plan

## 2018-03-05 ENCOUNTER — HOSPITAL ENCOUNTER (OUTPATIENT)
Dept: LAB | Age: 63
Discharge: HOME | End: 2018-03-05
Payer: COMMERCIAL

## 2018-03-05 ENCOUNTER — OFFICE VISIT (OUTPATIENT)
Dept: INTERNAL MEDICINE CLINIC | Age: 63
End: 2018-03-05

## 2018-03-05 VITALS
WEIGHT: 196 LBS | OXYGEN SATURATION: 98 % | BODY MASS INDEX: 32.65 KG/M2 | SYSTOLIC BLOOD PRESSURE: 130 MMHG | HEART RATE: 75 BPM | DIASTOLIC BLOOD PRESSURE: 70 MMHG | HEIGHT: 65 IN

## 2018-03-05 DIAGNOSIS — B00.9 HSV-1 INFECTION: ICD-10-CM

## 2018-03-05 DIAGNOSIS — N60.81 SEBACEOUS CYST OF BREAST, RIGHT: ICD-10-CM

## 2018-03-05 PROCEDURE — 87070 CULTURE OTHR SPECIMN AEROBIC: CPT | Performed by: NURSE PRACTITIONER

## 2018-03-05 RX ORDER — VALACYCLOVIR HYDROCHLORIDE 1 G/1
1000 TABLET, FILM COATED ORAL
Qty: 20 TAB | Refills: 0 | Status: SHIPPED | OUTPATIENT
Start: 2018-03-05 | End: 2020-11-24 | Stop reason: SDUPTHER

## 2018-03-05 RX ORDER — FLUCONAZOLE 150 MG/1
150 TABLET ORAL DAILY
Qty: 1 TAB | Refills: 0 | Status: SHIPPED | OUTPATIENT
Start: 2018-03-05 | End: 2018-03-06

## 2018-03-05 RX ORDER — CEPHALEXIN 500 MG/1
500 CAPSULE ORAL 4 TIMES DAILY
Qty: 40 CAP | Refills: 0 | Status: SHIPPED | OUTPATIENT
Start: 2018-03-05 | End: 2018-03-15

## 2018-03-05 NOTE — LETTER
NOTIFICATION RETURN TO WORK / SCHOOL 
 
3/5/2018 12:06 PM 
 
Ms. Brianna Marie 
[address] [address] To Whom It May Concern: 
 
Brianna Marie is currently under the care of Carmel[facility] Garza. She will return to work/school on: 03/07/2018 If there are questions or concerns please have the patient contact our office.  
 
 
 
Sincerely, 
 
 
 
 
 
 
 
Hood Mcdaniels NP

## 2018-03-05 NOTE — PROGRESS NOTES
Polo Gomez is a 59 y.o. female presenting today for Breast Cyst  .       HPI:  Brianna Marie presents to the office today for right mid sternum chest cyst x 1 week. Patient noted blood-tinged draining some night. The cyst is painful to touch but patient denies any fever. Review of Systems   Constitutional: Negative for fever. Respiratory: Negative for cough. Cardiovascular: Negative for chest pain and palpitations. Skin: Negative for itching. Allergies   Allergen Reactions    Atorvastatin Other (comments)     Leg cramps and swelling    Sulfa (Sulfonamide Antibiotics) Hives       Current Outpatient Prescriptions   Medication Sig Dispense Refill    cephALEXin (KEFLEX) 500 mg capsule Take 1 Cap by mouth four (4) times daily for 10 days. 40 Cap 0    fluconazole (DIFLUCAN) 150 mg tablet Take 1 Tab by mouth daily for 1 day. FDA advises cautious prescribing of oral fluconazole in pregnancy. 1 Tab 0    valACYclovir (VALTREX) 1 gram tablet Take 1 Tab by mouth two (2) times daily as needed. 20 Tab 0    guaiFENesin-codeine (ROBITUSSIN AC) 100-10 mg/5 mL solution Take 5 mL by mouth three (3) times daily as needed for Cough. Max Daily Amount: 15 mL. Do not drive if taking this medication 118 mL 0    montelukast (SINGULAIR) 10 mg tablet TAKE 1 TABLET BY MOUTH DAILY 90 Tab 0    cholecalciferol, VITAMIN D3, (VITAMIN D3) 5,000 unit tab tablet Take  by mouth daily.  levothyroxine (SYNTHROID) 100 mcg tablet TK 1 T PO QD  3    fluticasone (FLONASE) 50 mcg/actuation nasal spray 2 Sprays by Both Nostrils route daily. 1 Bottle 3    albuterol (PROVENTIL HFA, VENTOLIN HFA, PROAIR HFA) 90 mcg/actuation inhaler Take 2 Puffs by inhalation every four (4) hours as needed for Wheezing. 1 Inhaler 2       Past Medical History:   Diagnosis Date    Asthma     childhood diagnosis; occasional inhaler use    Chest pain     Diverticulitis     Dyslipidemia     \"borderline\" per pt.     Endometriosis     GERD (gastroesophageal reflux disease)     Hypothyroidism     Normal nuclear stress test 09/14/2011    No ischemia or infarct. EF 75%. No WMA. Neg max EST w/nondiagnostic EKG changes and atypical chest discomfort at peak exercise.  Restless leg syndrome     Thyroid disease     Tobacco use disorder     ongoing       Past Surgical History:   Procedure Laterality Date    FOOT/TOES SURGERY PROC UNLISTED      HX HYSTERECTOMY      HX HYSTEROSCOPY      HX OVARIAN CYST REMOVAL      teenager    HX TUBAL LIGATION      30 years ago       Social History     Social History    Marital status:      Spouse name: N/A    Number of children: N/A    Years of education: N/A     Occupational History    Not on file. Social History Main Topics    Smoking status: Current Every Day Smoker     Packs/day: 0.25     Years: 25.00    Smokeless tobacco: Never Used    Alcohol use Yes      Comment: social    Drug use: No    Sexual activity: Yes     Partners: Male     Birth control/ protection: None     Other Topics Concern    Not on file     Social History Narrative       Patient does not have an advanced directive on file    Vitals:    03/05/18 1146   BP: 130/70   Pulse: 75   SpO2: 98%   Weight: 196 lb (88.9 kg)   Height: 5' 5\" (1.651 m)       Physical Exam   Cardiovascular: Normal rate, regular rhythm and normal heart sounds. Pulmonary/Chest: Effort normal and breath sounds normal.       Neurological: She is alert. Skin: There is erythema. Nursing note and vitals reviewed. Office Visit on 02/16/2018   Component Date Value Ref Range Status    VALID INTERNAL CONTROL POC 02/16/2018 Yes   Final    QuickVue Influenza test 02/16/2018 Negative  Negative Final    influenza A    VALID INTERNAL CONTROL POC 02/16/2018 Yes   Final    QuickVue Influenza test 02/16/2018 Negative  Negative Final       .No results found for any visits on 03/05/18.     Assessment / Plan:      ICD-10-CM ICD-9-CM    1. Sebaceous cyst of breast, right N60.81 610.8 cephALEXin (KEFLEX) 500 mg capsule      fluconazole (DIFLUCAN) 150 mg tablet      CULTURE, WOUND W GRAM STAIN   2. HSV-1 infection B00.9 054.9 valACYclovir (VALTREX) 1 gram tablet     Wound culture  Keflex prescription  Refilled Valtrex  Diflucan rx  F/u in 2 days    Follow-up Disposition:  Return in about 2 days (around 3/7/2018), or if symptoms worsen or fail to improve. I asked the patient if she  had any questions and answered her  questions.   The patient stated that she understands the treatment plan and agrees with the treatment plan

## 2018-03-05 NOTE — LETTER
NOTIFICATION RETURN TO WORK / SCHOOL 
 
3/5/2018 12:03 PM 
 
Ms. Brianna Marie 
[address] [place] [facility] [id number] To Whom It May Concern: 
 
Brianna Marie is currently under the care of Carmelita Garza. She will return to work/school on: 03/08/2018 If there are questions or concerns please have the patient contact our office.  
 
 
 
Sincerely, 
 
 
 
 
 
 
Parul Brown NP

## 2018-03-07 ENCOUNTER — CLINICAL SUPPORT (OUTPATIENT)
Dept: INTERNAL MEDICINE CLINIC | Age: 63
End: 2018-03-07

## 2018-03-07 ENCOUNTER — HOSPITAL ENCOUNTER (OUTPATIENT)
Dept: LAB | Age: 63
Discharge: HOME | End: 2018-03-07
Payer: COMMERCIAL

## 2018-03-07 PROCEDURE — 87077 CULTURE AEROBIC IDENTIFY: CPT | Performed by: NURSE PRACTITIONER

## 2018-03-07 PROCEDURE — 87205 SMEAR GRAM STAIN: CPT | Performed by: NURSE PRACTITIONER

## 2018-03-07 PROCEDURE — 87186 SC STD MICRODIL/AGAR DIL: CPT | Performed by: NURSE PRACTITIONER

## 2018-03-08 ENCOUNTER — HOSPITAL ENCOUNTER (OUTPATIENT)
Dept: LAB | Age: 63
Discharge: HOME | End: 2018-03-08
Payer: COMMERCIAL

## 2018-03-08 ENCOUNTER — TELEPHONE (OUTPATIENT)
Dept: INTERNAL MEDICINE CLINIC | Age: 63
End: 2018-03-08

## 2018-03-08 DIAGNOSIS — N60.01 BREAST CYST, RIGHT: Primary | ICD-10-CM

## 2018-03-08 DIAGNOSIS — N60.01 BREAST CYST, RIGHT: ICD-10-CM

## 2018-03-08 LAB
BACTERIA SPEC CULT: ABNORMAL
GRAM STN SPEC: ABNORMAL
GRAM STN SPEC: ABNORMAL
SERVICE CMNT-IMP: ABNORMAL

## 2018-03-08 PROCEDURE — 87070 CULTURE OTHR SPECIMN AEROBIC: CPT | Performed by: NURSE PRACTITIONER

## 2018-03-08 PROCEDURE — 87077 CULTURE AEROBIC IDENTIFY: CPT | Performed by: NURSE PRACTITIONER

## 2018-03-08 PROCEDURE — 87186 SC STD MICRODIL/AGAR DIL: CPT | Performed by: NURSE PRACTITIONER

## 2018-03-08 NOTE — TELEPHONE ENCOUNTER
Patient called to let Argyle Dakin know that her cyst popped and is oozing. She wanted to know if that was okay and if she needed to do anything. Please call her at [phone].

## 2018-03-08 NOTE — TELEPHONE ENCOUNTER
Message was printed and given to Imelda Stairs for review. Patient advised to come in for Devaughn Fisher to look at area. Patient acknowledged understanding.

## 2018-03-12 LAB
BACTERIA SPEC CULT: ABNORMAL
GRAM STN SPEC: ABNORMAL
SERVICE CMNT-IMP: ABNORMAL
SERVICE CMNT-IMP: ABNORMAL

## 2018-03-23 ENCOUNTER — OFFICE VISIT (OUTPATIENT)
Dept: INTERNAL MEDICINE CLINIC | Age: 63
End: 2018-03-23

## 2018-03-23 VITALS
TEMPERATURE: 97.5 F | HEART RATE: 73 BPM | BODY MASS INDEX: 32.65 KG/M2 | RESPIRATION RATE: 18 BRPM | OXYGEN SATURATION: 98 % | HEIGHT: 65 IN | DIASTOLIC BLOOD PRESSURE: 72 MMHG | SYSTOLIC BLOOD PRESSURE: 128 MMHG | WEIGHT: 196 LBS

## 2018-03-23 DIAGNOSIS — L72.3 SEBACEOUS CYST: ICD-10-CM

## 2018-03-23 DIAGNOSIS — J30.9 ALLERGIC RHINITIS, UNSPECIFIED CHRONICITY, UNSPECIFIED SEASONALITY, UNSPECIFIED TRIGGER: Primary | ICD-10-CM

## 2018-03-23 NOTE — PROGRESS NOTES
Jeffery Arriaza is a 59 y.o. female presenting today for Sinus Pain (started 03/22/2018); Head Pain; and Ear Pain  . HPI:  Jona Baez Self presents to the office today for sinus, head and ear pain x 1 days. She is negative for nasal congestion or fever. Patient also has a sebaceous cyst in the mid chest region. Patient has been treated with antibiotics and the area continues to with small amount of drainage. Review of Systems   Constitutional: Negative for fever. HENT: Positive for ear pain and sinus pain. Respiratory: Negative for cough and shortness of breath. Cardiovascular: Negative for chest pain and palpitations. Neurological: Positive for headaches. Allergies   Allergen Reactions    Atorvastatin Other (comments)     Leg cramps and swelling    Sulfa (Sulfonamide Antibiotics) Hives       Current Outpatient Prescriptions   Medication Sig Dispense Refill    valACYclovir (VALTREX) 1 gram tablet Take 1 Tab by mouth two (2) times daily as needed. 20 Tab 0    montelukast (SINGULAIR) 10 mg tablet TAKE 1 TABLET BY MOUTH DAILY 90 Tab 0    cholecalciferol, VITAMIN D3, (VITAMIN D3) 5,000 unit tab tablet Take  by mouth daily.  levothyroxine (SYNTHROID) 100 mcg tablet TK 1 T PO QD  3    fluticasone (FLONASE) 50 mcg/actuation nasal spray 2 Sprays by Both Nostrils route daily. 1 Bottle 3    albuterol (PROVENTIL HFA, VENTOLIN HFA, PROAIR HFA) 90 mcg/actuation inhaler Take 2 Puffs by inhalation every four (4) hours as needed for Wheezing. 1 Inhaler 2    guaiFENesin-codeine (ROBITUSSIN AC) 100-10 mg/5 mL solution Take 5 mL by mouth three (3) times daily as needed for Cough. Max Daily Amount: 15 mL. Do not drive if taking this medication 118 mL 0       Past Medical History:   Diagnosis Date    Asthma     childhood diagnosis; occasional inhaler use    Chest pain     Diverticulitis     Dyslipidemia     \"borderline\" per pt.     Endometriosis     GERD (gastroesophageal reflux disease)     Hypothyroidism     Normal nuclear stress test 09/14/2011    No ischemia or infarct. EF 75%. No WMA. Neg max EST w/nondiagnostic EKG changes and atypical chest discomfort at peak exercise.  Restless leg syndrome     Thyroid disease     Tobacco use disorder     ongoing       Past Surgical History:   Procedure Laterality Date    FOOT/TOES SURGERY PROC UNLISTED      HX HYSTERECTOMY      HX HYSTEROSCOPY      HX OVARIAN CYST REMOVAL      teenager    HX TUBAL LIGATION      30 years ago       Social History     Social History    Marital status:      Spouse name: N/A    Number of children: N/A    Years of education: N/A     Occupational History    Not on file. Social History Main Topics    Smoking status: Current Every Day Smoker     Packs/day: 0.25     Years: 25.00    Smokeless tobacco: Never Used    Alcohol use Yes      Comment: social    Drug use: No    Sexual activity: Yes     Partners: Male     Birth control/ protection: None     Other Topics Concern    Not on file     Social History Narrative       Patient does not have an advanced directive on file    Vitals:    03/23/18 1236   BP: 128/72   Pulse: 73   Resp: 18   Temp: 97.5 °F (36.4 °C)   TempSrc: Tympanic   SpO2: 98%   Weight: 196 lb (88.9 kg)   Height: 5' 5\" (1.651 m)       Physical Exam   HENT:   Right Ear: External ear normal.   Left Ear: External ear normal.   Mouth/Throat: No oropharyngeal exudate. Cardiovascular: Normal rate, regular rhythm and normal heart sounds. Pulmonary/Chest: Effort normal and breath sounds normal.   Nursing note and vitals reviewed.       Hospital Outpatient Visit on 03/08/2018   Component Date Value Ref Range Status    Special Requests: 03/08/2018 NO SPECIAL REQUESTS    Final    GRAM STAIN 03/08/2018 FEW WBC'S    Final    GRAM STAIN 03/08/2018 NO ORGANISMS SEEN    Final    Culture result: 03/08/2018 FEW STAPHYLOCOCCUS LUGDUNENSIS*   Final    Culture result: 03/08/2018 FEW DIPHTHEROIDS (TWO MORPHOTYPES)*   Final   Hospital Outpatient Visit on 03/07/2018   Component Date Value Ref Range Status    Special Requests: 03/07/2018 NO SPECIAL REQUESTS    Final    GRAM STAIN 03/07/2018 RARE WBC'S    Final    GRAM STAIN 03/07/2018 NO ORGANISMS SEEN    Final    Culture result: 03/07/2018 RARE DIPHTHEROIDS*   Final    Culture result: 03/07/2018 STAPHYLOCOCCUS LUGDUNENSIS ISOLATED FROM BROTH ONLY*   Final    Culture result: 03/07/2018 STAPHYLOCOCCUS EPIDERMIDIS ISOLATED FROM BROTH ONLY*   Final    Culture result: 03/07/2018 STREPTOCOCCUS VIRIDANS ISOLATED FROM BROTH ONLY*   Final   Hospital Outpatient Visit on 03/05/2018   Component Date Value Ref Range Status    Special Requests: 03/05/2018 NO SPECIAL REQUESTS    Final    GRAM STAIN 03/05/2018 NO WBC'S SEEN    Final    GRAM STAIN 03/05/2018 NO ORGANISMS SEEN    Final    Culture result: 03/05/2018 STAPHYLOCOCCUS SPECIES, COAGULASE NEGATIVE ISOLATED FROM BROTH ONLY*   Final   Office Visit on 02/16/2018   Component Date Value Ref Range Status    VALID INTERNAL CONTROL POC 02/16/2018 Yes   Final    QuickVue Influenza test 02/16/2018 Negative  Negative Final    influenza A    VALID INTERNAL CONTROL POC 02/16/2018 Yes   Final    QuickVue Influenza test 02/16/2018 Negative  Negative Final       .No results found for any visits on 03/23/18. Assessment / Plan:      ICD-10-CM ICD-9-CM    1. Allergic rhinitis, unspecified chronicity, unspecified seasonality, unspecified trigger J30.9 477.9    2. Sebaceous cyst L72.3 706.2 REFERRAL TO SURGERY     OTC Claritin  Continue Singulair and Flonase  Referral to surgeon- cyst    Follow-up Disposition:  Return if symptoms worsen or fail to improve. I asked the patient if she  had any questions and answered her  questions.   The patient stated that she understands the treatment plan and agrees with the treatment plan

## 2018-04-23 ENCOUNTER — OFFICE VISIT (OUTPATIENT)
Dept: INTERNAL MEDICINE CLINIC | Age: 63
End: 2018-04-23

## 2018-04-23 VITALS
TEMPERATURE: 98.6 F | BODY MASS INDEX: 32.32 KG/M2 | WEIGHT: 194 LBS | DIASTOLIC BLOOD PRESSURE: 78 MMHG | SYSTOLIC BLOOD PRESSURE: 138 MMHG | HEART RATE: 90 BPM | OXYGEN SATURATION: 97 % | HEIGHT: 65 IN | RESPIRATION RATE: 16 BRPM

## 2018-04-23 DIAGNOSIS — R19.7 DIARRHEA, UNSPECIFIED TYPE: Primary | ICD-10-CM

## 2018-04-23 NOTE — PATIENT INSTRUCTIONS
Diarrhea: Care Instructions  Your Care Instructions    Diarrhea is loose, watery stools (bowel movements). The exact cause is often hard to find. Sometimes diarrhea is your body's way of getting rid of what caused an upset stomach. Viruses, food poisoning, and many medicines can cause diarrhea. Some people get diarrhea in response to emotional stress, anxiety, or certain foods. Almost everyone has diarrhea now and then. It usually isn't serious, and your stools will return to normal soon. The important thing to do is replace the fluids you have lost, so you can prevent dehydration. The doctor has checked you carefully, but problems can develop later. If you notice any problems or new symptoms, get medical treatment right away. Follow-up care is a key part of your treatment and safety. Be sure to make and go to all appointments, and call your doctor if you are having problems. It's also a good idea to know your test results and keep a list of the medicines you take. How can you care for yourself at home? · Watch for signs of dehydration, which means your body has lost too much water. Dehydration is a serious condition and should be treated right away. Signs of dehydration are:  ¨ Increasing thirst and dry eyes and mouth. ¨ Feeling faint or lightheaded. ¨ Darker urine, and a smaller amount of urine than normal.  · To prevent dehydration, drink plenty of fluids, enough so that your urine is light yellow or clear like water. Choose water and other caffeine-free clear liquids until you feel better. If you have kidney, heart, or liver disease and have to limit fluids, talk with your doctor before you increase the amount of fluids you drink. · Begin eating small amounts of mild foods the next day, if you feel like it. ¨ Try yogurt that has live cultures of Lactobacillus. (Check the label.)  ¨ Avoid spicy foods, fruits, alcohol, and caffeine until 48 hours after all symptoms are gone.   ¨ Avoid chewing gum that contains sorbitol. ¨ Avoid dairy products (except for yogurt with Lactobacillus) while you have diarrhea and for 3 days after symptoms are gone. · The doctor may recommend that you take over-the-counter medicine, such as loperamide (Imodium), if you still have diarrhea after 6 hours. Read and follow all instructions on the label. Do not use this medicine if you have bloody diarrhea, a high fever, or other signs of serious illness. Call your doctor if you think you are having a problem with your medicine. When should you call for help? Call 911 anytime you think you may need emergency care. For example, call if:  ? · You passed out (lost consciousness). ? · Your stools are maroon or very bloody. ?Call your doctor now or seek immediate medical care if:  ? · You are dizzy or lightheaded, or you feel like you may faint. ? · Your stools are black and look like tar, or they have streaks of blood. ? · You have new or worse belly pain. ? · You have symptoms of dehydration, such as:  ¨ Dry eyes and a dry mouth. ¨ Passing only a little dark urine. ¨ Feeling thirstier than usual.   ? · You have a new or higher fever. ? Watch closely for changes in your health, and be sure to contact your doctor if:  ? · Your diarrhea is getting worse. ? · You see pus in the diarrhea. ? · You are not getting better after 2 days (48 hours). Where can you learn more? Go to http://michael-jessi.info/. Enter Y014 in the search box to learn more about \"Diarrhea: Care Instructions. \"  Current as of: March 20, 2017  Content Version: 11.4  © 1737-1236 [employer]. Care instructions adapted under license by [employer] (which disclaims liability or warranty for this information). If you have questions about a medical condition or this instruction, always ask your healthcare professional. [address] any warranty or liability for your use of this information.

## 2018-04-23 NOTE — PROGRESS NOTES
HPI:   36 hrs of loose stools w/o nausea, unusual travel/ingestions, GI bleeding; took keflex 5 weeks ago; no abd pain, fever  Needs work note    ROS is otherwise negative. Past Medical History:   Diagnosis Date    Asthma     childhood diagnosis; occasional inhaler use    Chest pain     Diverticulitis     Dyslipidemia     \"borderline\" per pt.  Endometriosis     GERD (gastroesophageal reflux disease)     Hypothyroidism     Normal nuclear stress test 09/14/2011    No ischemia or infarct. EF 75%. No WMA. Neg max EST w/nondiagnostic EKG changes and atypical chest discomfort at peak exercise.  Restless leg syndrome     Thyroid disease     Tobacco use disorder     ongoing       Past Surgical History:   Procedure Laterality Date    FOOT/TOES SURGERY PROC UNLISTED      HX HYSTERECTOMY      HX HYSTEROSCOPY      HX OVARIAN CYST REMOVAL      teenager    HX TUBAL LIGATION      30 years ago       Social History     Social History    Marital status:      Spouse name: N/A    Number of children: N/A    Years of education: N/A     Occupational History    Not on file.      Social History Main Topics    Smoking status: Current Every Day Smoker     Packs/day: 0.25     Years: 25.00    Smokeless tobacco: Never Used    Alcohol use Yes      Comment: social    Drug use: No    Sexual activity: Yes     Partners: Male     Birth control/ protection: None     Other Topics Concern    Not on file     Social History Narrative       Allergies   Allergen Reactions    Atorvastatin Other (comments)     Leg cramps and swelling    Sulfa (Sulfonamide Antibiotics) Hives       Family History   Problem Relation Age of Onset    Lung Disease Mother     Cancer Father     Cancer Sister 52     breast cancer    Asthma Sister     Stroke Sister     Asthma Brother     Heart Disease Brother     Hypertension Brother        Current Outpatient Prescriptions   Medication Sig Dispense Refill    valACYclovir (VALTREX) 1 gram tablet Take 1 Tab by mouth two (2) times daily as needed. 20 Tab 0    guaiFENesin-codeine (ROBITUSSIN AC) 100-10 mg/5 mL solution Take 5 mL by mouth three (3) times daily as needed for Cough. Max Daily Amount: 15 mL. Do not drive if taking this medication 118 mL 0    montelukast (SINGULAIR) 10 mg tablet TAKE 1 TABLET BY MOUTH DAILY 90 Tab 0    cholecalciferol, VITAMIN D3, (VITAMIN D3) 5,000 unit tab tablet Take  by mouth daily.  levothyroxine (SYNTHROID) 100 mcg tablet TK 1 T PO QD  3    fluticasone (FLONASE) 50 mcg/actuation nasal spray 2 Sprays by Both Nostrils route daily. 1 Bottle 3    albuterol (PROVENTIL HFA, VENTOLIN HFA, PROAIR HFA) 90 mcg/actuation inhaler Take 2 Puffs by inhalation every four (4) hours as needed for Wheezing. 1 Inhaler 2           Visit Vitals    /78 (BP 1 Location: Left arm, BP Patient Position: Sitting)    Pulse 90    Temp 98.6 °F (37 °C) (Tympanic)    Resp 16    Ht 5' 5\" (1.651 m)    Wt 194 lb (88 kg)    SpO2 97%    BMI 32.28 kg/m2       PE  Well nourished in NAD  HEENT:   OP: clear. Neck: supple w/o mass   Chest: clear. CV: RRR w/o m,r,g  Abd: +BS, soft, NT, w/o HSM or mass. Ext: w/o edema. Neuro: NF. Assessment and Plan    Encounter Diagnoses   Name Primary?     Diarrhea, unspecified type Yes     Probable viral gastroenteritis  [place] diet, increase fluids  F/U worsening or unimproved 7 days  OV 4 mos or prn  I have explained plan to patient and the patient verbalizes understanding Toan Whipple MD

## 2018-04-23 NOTE — PROGRESS NOTES
1. Have you been to the ER, urgent care clinic since your last visit? Hospitalized since your last visit? No    2. Have you seen or consulted any other health care providers outside of the [address] [place] [address] since your last visit? Include any pap smears or colon screening.  No

## 2018-04-23 NOTE — LETTER
4/23/2018 1:34 PM 
 
Ms. Brianna Marie 
[address] [facility] [place] [id number] Ms Marie was seen today for a medical exam. 
 
 
 
 
Sincerely, 
 
 
Tracie Sexton MD

## 2018-05-18 ENCOUNTER — OFFICE VISIT (OUTPATIENT)
Dept: SURGERY | Age: 63
End: 2018-05-18

## 2018-05-18 VITALS
DIASTOLIC BLOOD PRESSURE: 78 MMHG | OXYGEN SATURATION: 98 % | BODY MASS INDEX: 32.32 KG/M2 | TEMPERATURE: 97.6 F | RESPIRATION RATE: 18 BRPM | HEIGHT: 65 IN | WEIGHT: 194 LBS | HEART RATE: 82 BPM | SYSTOLIC BLOOD PRESSURE: 130 MMHG

## 2018-05-18 DIAGNOSIS — N60.81 SEBACEOUS CYST OF BREAST, RIGHT: Primary | ICD-10-CM

## 2018-05-18 PROBLEM — L72.3 SEBACEOUS CYST: Status: ACTIVE | Noted: 2018-05-18

## 2018-06-18 ENCOUNTER — OFFICE VISIT (OUTPATIENT)
Dept: INTERNAL MEDICINE CLINIC | Age: 63
End: 2018-06-18

## 2018-06-18 VITALS
HEIGHT: 65 IN | DIASTOLIC BLOOD PRESSURE: 62 MMHG | WEIGHT: 195 LBS | RESPIRATION RATE: 16 BRPM | OXYGEN SATURATION: 96 % | HEART RATE: 73 BPM | BODY MASS INDEX: 32.49 KG/M2 | SYSTOLIC BLOOD PRESSURE: 112 MMHG | TEMPERATURE: 98.5 F

## 2018-06-18 DIAGNOSIS — Z87.09 HISTORY OF ASTHMA: ICD-10-CM

## 2018-06-18 DIAGNOSIS — R42 VERTIGO: Primary | ICD-10-CM

## 2018-06-18 RX ORDER — ALBUTEROL SULFATE 90 UG/1
2 AEROSOL, METERED RESPIRATORY (INHALATION)
Qty: 1 INHALER | Refills: 2 | Status: SHIPPED | OUTPATIENT
Start: 2018-06-18 | End: 2020-04-02 | Stop reason: SDUPTHER

## 2018-06-18 RX ORDER — FLUTICASONE PROPIONATE 50 MCG
2 SPRAY, SUSPENSION (ML) NASAL DAILY
Qty: 1 BOTTLE | Refills: 3 | Status: SHIPPED | OUTPATIENT
Start: 2018-06-18 | End: 2018-06-18 | Stop reason: SDUPTHER

## 2018-06-18 RX ORDER — MECLIZINE HYDROCHLORIDE 25 MG/1
25 TABLET ORAL
Qty: 30 TAB | Refills: 0 | Status: SHIPPED | OUTPATIENT
Start: 2018-06-18 | End: 2018-06-28

## 2018-06-18 NOTE — PROGRESS NOTES
Martín Maravilla is a 59 y.o. female presenting today for Dizziness and Nausea  . HPI:  Hunter Larkin Self presents to the office today for dizziness and nausea x 2 days. Patient complaints of positional dizziness and nasuea. Review of Systems   Respiratory: Negative for cough. Cardiovascular: Negative for chest pain and palpitations. Gastrointestinal: Positive for nausea. Neurological: Positive for dizziness. Allergies   Allergen Reactions    Atorvastatin Other (comments)     Leg cramps and swelling    Sulfa (Sulfonamide Antibiotics) Hives       Current Outpatient Prescriptions   Medication Sig Dispense Refill    fluticasone (FLONASE) 50 mcg/actuation nasal spray 2 Sprays by Both Nostrils route daily. 1 Bottle 3    albuterol (PROVENTIL HFA, VENTOLIN HFA, PROAIR HFA) 90 mcg/actuation inhaler Take 2 Puffs by inhalation every four (4) hours as needed for Wheezing. 1 Inhaler 2    meclizine (ANTIVERT) 25 mg tablet Take 1 Tab by mouth three (3) times daily as needed for up to 10 days. 30 Tab 0    valACYclovir (VALTREX) 1 gram tablet Take 1 Tab by mouth two (2) times daily as needed. 20 Tab 0    montelukast (SINGULAIR) 10 mg tablet TAKE 1 TABLET BY MOUTH DAILY 90 Tab 0    cholecalciferol, VITAMIN D3, (VITAMIN D3) 5,000 unit tab tablet Take  by mouth daily.  levothyroxine (SYNTHROID) 100 mcg tablet TK 1 T PO QD  3    guaiFENesin-codeine (ROBITUSSIN AC) 100-10 mg/5 mL solution Take 5 mL by mouth three (3) times daily as needed for Cough. Max Daily Amount: 15 mL. Do not drive if taking this medication 118 mL 0       Past Medical History:   Diagnosis Date    Asthma     childhood diagnosis; occasional inhaler use    Chest pain     Diverticulitis     Dyslipidemia     \"borderline\" per pt.  Endometriosis     GERD (gastroesophageal reflux disease)     Hypothyroidism     Normal nuclear stress test 09/14/2011    No ischemia or infarct. EF 75%. No WMA.   Neg max EST w/nondiagnostic EKG changes and atypical chest discomfort at peak exercise.  Restless leg syndrome     Thyroid disease     Tobacco use disorder     ongoing       Past Surgical History:   Procedure Laterality Date    FOOT/TOES SURGERY PROC UNLISTED      HX HYSTERECTOMY      HX HYSTEROSCOPY      HX OVARIAN CYST REMOVAL      teenager    HX TUBAL LIGATION      30 years ago       Social History     Social History    Marital status:      Spouse name: N/A    Number of children: N/A    Years of education: N/A     Occupational History    Not on file. Social History Main Topics    Smoking status: Current Every Day Smoker     Packs/day: 0.25     Years: 25.00    Smokeless tobacco: Never Used    Alcohol use Yes      Comment: social    Drug use: No    Sexual activity: Yes     Partners: Male     Birth control/ protection: None     Other Topics Concern    Not on file     Social History Narrative       Patient does not have an advanced directive on file    Vitals:    06/18/18 1520   BP: 112/62   Pulse: 73   Resp: 16   Temp: 98.5 °F (36.9 °C)   TempSrc: Tympanic   SpO2: 96%   Weight: 195 lb (88.5 kg)   Height: 5' 5\" (1.651 m)   PainSc:   0 - No pain       Physical Exam   Constitutional: No distress. Cardiovascular: Normal rate and regular rhythm. Pulmonary/Chest: Effort normal and breath sounds normal.   Musculoskeletal: Normal range of motion. Neurological: She is alert. No cranial nerve deficit. Gait normal.   Nursing note and vitals reviewed. No visits with results within 3 Month(s) from this visit.   Latest known visit with results is:    Hospital Outpatient Visit on 03/08/2018   Component Date Value Ref Range Status    Special Requests: 03/08/2018 NO SPECIAL REQUESTS    Final    GRAM STAIN 03/08/2018 FEW WBC'S    Final    GRAM STAIN 03/08/2018 NO ORGANISMS SEEN    Final    Culture result: 03/08/2018 FEW STAPHYLOCOCCUS LUGDUNENSIS*   Final    Culture result: 03/08/2018 FEW DIPHTHEROIDS (TWO MORPHOTYPES)* Final       .No results found for any visits on 06/18/18. Assessment / Plan:      ICD-10-CM ICD-9-CM    1. Vertigo R42 780.4 meclizine (ANTIVERT) 25 mg tablet   2. History of asthma Z87.09 V12.69 fluticasone (FLONASE) 50 mcg/actuation nasal spray      albuterol (PROVENTIL HFA, VENTOLIN HFA, PROAIR HFA) 90 mcg/actuation inhaler     Probable vertigo  Meclizine rx  Fluids  Rest  Work note given  F/u prn      Follow-up Disposition:  Return if symptoms worsen or fail to improve. I asked the patient if she  had any questions and answered her  questions.   The patient stated that she understands the treatment plan and agrees with the treatment plan

## 2018-06-18 NOTE — LETTER
NOTIFICATION RETURN TO WORK / SCHOOL 
 
6/18/2018 4:08 PM 
 
Ms. Brianna Marie 
[address] [facility] [facility] [place] [id number] To Whom It May Concern: 
 
Brianna Marie is currently under the care of Carmelita Garza. She will return to work/school on: 06/20/2018 If there are questions or concerns please have the patient contact our office.  
 
 
 
Sincerely, 
 
 
 
 
 
Francy Espinoza NP

## 2018-06-19 RX ORDER — FLUTICASONE PROPIONATE 50 MCG
SPRAY, SUSPENSION (ML) NASAL
Qty: 1 BOTTLE | Refills: 3 | Status: SHIPPED | OUTPATIENT
Start: 2018-06-19 | End: 2018-12-03 | Stop reason: SDUPTHER

## 2018-08-07 RX ORDER — MONTELUKAST SODIUM 10 MG/1
TABLET ORAL
Qty: 90 TAB | Refills: 0 | Status: SHIPPED | OUTPATIENT
Start: 2018-08-07 | End: 2018-08-23 | Stop reason: SDUPTHER

## 2018-08-24 RX ORDER — MONTELUKAST SODIUM 10 MG/1
TABLET ORAL
Qty: 90 TAB | Refills: 0 | Status: SHIPPED | OUTPATIENT
Start: 2018-08-24 | End: 2019-09-25 | Stop reason: SDUPTHER

## 2018-12-03 ENCOUNTER — OFFICE VISIT (OUTPATIENT)
Dept: INTERNAL MEDICINE CLINIC | Age: 63
End: 2018-12-03

## 2018-12-03 ENCOUNTER — HOSPITAL ENCOUNTER (OUTPATIENT)
Dept: LAB | Age: 63
Discharge: HOME | End: 2018-12-03
Payer: COMMERCIAL

## 2018-12-03 VITALS
OXYGEN SATURATION: 95 % | SYSTOLIC BLOOD PRESSURE: 124 MMHG | WEIGHT: 194.6 LBS | DIASTOLIC BLOOD PRESSURE: 60 MMHG | TEMPERATURE: 98.7 F | HEART RATE: 81 BPM | BODY MASS INDEX: 32.42 KG/M2 | RESPIRATION RATE: 16 BRPM | HEIGHT: 65 IN

## 2018-12-03 DIAGNOSIS — E78.5 DYSLIPIDEMIA: Primary | ICD-10-CM

## 2018-12-03 DIAGNOSIS — E55.9 VITAMIN D DEFICIENCY: ICD-10-CM

## 2018-12-03 DIAGNOSIS — E66.9 OBESITY (BMI 30-39.9): ICD-10-CM

## 2018-12-03 DIAGNOSIS — E03.9 HYPOTHYROIDISM, UNSPECIFIED TYPE: ICD-10-CM

## 2018-12-03 DIAGNOSIS — Z87.09 HISTORY OF ASTHMA: ICD-10-CM

## 2018-12-03 DIAGNOSIS — E78.5 DYSLIPIDEMIA: ICD-10-CM

## 2018-12-03 LAB
ALBUMIN SERPL-MCNC: 4.1 G/DL (ref 3.4–5)
ALBUMIN/GLOB SERPL: 1.4 {RATIO} (ref 0.8–1.7)
ALP SERPL-CCNC: 69 U/L (ref 45–117)
ALT SERPL-CCNC: 16 U/L (ref 13–56)
ANION GAP SERPL CALC-SCNC: 8 MMOL/L (ref 3–18)
AST SERPL-CCNC: 5 U/L (ref 15–37)
BASOPHILS # BLD: 0.1 K/UL (ref 0–0.1)
BASOPHILS NFR BLD: 1 % (ref 0–2)
BILIRUB SERPL-MCNC: 0.5 MG/DL (ref 0.2–1)
BUN SERPL-MCNC: 13 MG/DL (ref 7–18)
BUN/CREAT SERPL: 16 (ref 12–20)
CALCIUM SERPL-MCNC: 9.8 MG/DL (ref 8.5–10.1)
CHLORIDE SERPL-SCNC: 107 MMOL/L (ref 100–108)
CHOLEST SERPL-MCNC: 244 MG/DL
CO2 SERPL-SCNC: 27 MMOL/L (ref 21–32)
CREAT SERPL-MCNC: 0.8 MG/DL (ref 0.6–1.3)
DIFFERENTIAL METHOD BLD: ABNORMAL
EOSINOPHIL # BLD: 0.1 K/UL (ref 0–0.4)
EOSINOPHIL NFR BLD: 2 % (ref 0–5)
ERYTHROCYTE [DISTWIDTH] IN BLOOD BY AUTOMATED COUNT: 12.8 % (ref 11.6–14.5)
GLOBULIN SER CALC-MCNC: 2.9 G/DL (ref 2–4)
GLUCOSE SERPL-MCNC: 100 MG/DL (ref 74–99)
HCT VFR BLD AUTO: 46 % (ref 35–45)
HDLC SERPL-MCNC: 54 MG/DL (ref 40–60)
HDLC SERPL: 4.5 {RATIO} (ref 0–5)
HGB BLD-MCNC: 15 G/DL (ref 12–16)
LDLC SERPL CALC-MCNC: 155.4 MG/DL (ref 0–100)
LIPID PROFILE,FLP: ABNORMAL
LYMPHOCYTES # BLD: 2.1 K/UL (ref 0.9–3.6)
LYMPHOCYTES NFR BLD: 30 % (ref 21–52)
MCH RBC QN AUTO: 32.3 PG (ref 24–34)
MCHC RBC AUTO-ENTMCNC: 32.6 G/DL (ref 31–37)
MCV RBC AUTO: 99.1 FL (ref 74–97)
MONOCYTES # BLD: 0.4 K/UL (ref 0.05–1.2)
MONOCYTES NFR BLD: 6 % (ref 3–10)
NEUTS SEG # BLD: 4.3 K/UL (ref 1.8–8)
NEUTS SEG NFR BLD: 61 % (ref 40–73)
PLATELET # BLD AUTO: 296 K/UL (ref 135–420)
PMV BLD AUTO: 9.2 FL (ref 9.2–11.8)
POTASSIUM SERPL-SCNC: 4.5 MMOL/L (ref 3.5–5.5)
PROT SERPL-MCNC: 7 G/DL (ref 6.4–8.2)
RBC # BLD AUTO: 4.64 M/UL (ref 4.2–5.3)
SODIUM SERPL-SCNC: 142 MMOL/L (ref 136–145)
TRIGL SERPL-MCNC: 173 MG/DL (ref ?–150)
TSH SERPL DL<=0.05 MIU/L-ACNC: 0.88 UIU/ML (ref 0.36–3.74)
VLDLC SERPL CALC-MCNC: 34.6 MG/DL
WBC # BLD AUTO: 7.1 K/UL (ref 4.6–13.2)

## 2018-12-03 PROCEDURE — 84443 ASSAY THYROID STIM HORMONE: CPT

## 2018-12-03 PROCEDURE — 80053 COMPREHEN METABOLIC PANEL: CPT

## 2018-12-03 PROCEDURE — 82306 VITAMIN D 25 HYDROXY: CPT

## 2018-12-03 PROCEDURE — 36415 COLL VENOUS BLD VENIPUNCTURE: CPT

## 2018-12-03 PROCEDURE — 80061 LIPID PANEL: CPT

## 2018-12-03 PROCEDURE — 85025 COMPLETE CBC W/AUTO DIFF WBC: CPT

## 2018-12-03 RX ORDER — FLUTICASONE PROPIONATE 50 MCG
SPRAY, SUSPENSION (ML) NASAL
Qty: 1 BOTTLE | Refills: 3 | Status: SHIPPED | OUTPATIENT
Start: 2018-12-03 | End: 2021-04-01

## 2018-12-03 NOTE — PROGRESS NOTES
Kathe Kwan is a 59 y.o. female presenting today for Complete Physical  .       HPI:  Brianna Marie presents to the office today for complete physical examination. Patient also has a history of Vitamin D. Deficiency. She is complaining of difficulty hearing and is requesting a referral to ENT. Patient has a history of thyroid disease currently managed with levothyroxine and dyslipidemia . Patient denies chest pain, palpitations, dyspnea, headaches. Review of Systems   Constitutional: Negative for chills and fever. HENT: Positive for hearing loss. Negative for congestion. Respiratory: Negative for cough. Cardiovascular: Negative for chest pain and palpitations. Gastrointestinal: Negative for abdominal pain, nausea and vomiting. Genitourinary: Negative for frequency. Neurological: Negative for headaches. Allergies   Allergen Reactions    Atorvastatin Other (comments)     Leg cramps and swelling    Sulfa (Sulfonamide Antibiotics) Hives       Current Outpatient Medications   Medication Sig Dispense Refill    naltrexone-buPROPion (CONTRAVE) 8-90 mg TbER ER tablet Week 1 1 tab PO QAM, Week 2 1QAM 1QHS, Week 3 2QAM 1 QHS, Week 4 & beyond 2QAM 2QHS 162 Tab 0    fluticasone (FLONASE) 50 mcg/actuation nasal spray SHAKE LIQUID AND USE 2 SPRAYS IN EACH NOSTRIL DAILY 1 Bottle 3    montelukast (SINGULAIR) 10 mg tablet TAKE 1 TABLET BY MOUTH DAILY 90 Tab 0    albuterol (PROVENTIL HFA, VENTOLIN HFA, PROAIR HFA) 90 mcg/actuation inhaler Take 2 Puffs by inhalation every four (4) hours as needed for Wheezing. 1 Inhaler 2    valACYclovir (VALTREX) 1 gram tablet Take 1 Tab by mouth two (2) times daily as needed. 20 Tab 0    cholecalciferol, VITAMIN D3, (VITAMIN D3) 5,000 unit tab tablet Take  by mouth daily.       levothyroxine (SYNTHROID) 100 mcg tablet TK 1 T PO QD  3       Past Medical History:   Diagnosis Date    Asthma     childhood diagnosis; occasional inhaler use    Chest pain     Diverticulitis     Dyslipidemia     \"borderline\" per pt.  Endometriosis     GERD (gastroesophageal reflux disease)     Hypothyroidism     Normal nuclear stress test 09/14/2011    No ischemia or infarct. EF 75%. No WMA. Neg max EST w/nondiagnostic EKG changes and atypical chest discomfort at peak exercise.  Restless leg syndrome     Thyroid disease     Tobacco use disorder     ongoing       Past Surgical History:   Procedure Laterality Date    FOOT/TOES SURGERY PROC UNLISTED      HX HYSTERECTOMY      HX HYSTEROSCOPY      HX OVARIAN CYST REMOVAL      teenager    HX TUBAL LIGATION      30 years ago       Social History     Socioeconomic History    Marital status:      Spouse name: Not on file    Number of children: Not on file    Years of education: Not on file    Highest education level: Not on file   Social Needs    Financial resource strain: Not on file    Food insecurity - worry: Not on file    Food insecurity - inability: Not on file   [employer] needs - medical: Not on file   [employer] needs - non-medical: Not on file   Occupational History    Not on file   Tobacco Use    Smoking status: Current Every Day Smoker     Packs/day: 0.25     Years: 25.00     Pack years: 6.25    Smokeless tobacco: Never Used   Substance and Sexual Activity    Alcohol use: Yes     Comment: social    Drug use: No    Sexual activity: Yes     Partners: Male     Birth control/protection: None   Other Topics Concern    Not on file   Social History Narrative    Not on file       Patient does not have an advanced directive on file    Vitals:    12/03/18 0819   BP: 124/60   Pulse: 81   Resp: 16   Temp: 98.7 °F (37.1 °C)   TempSrc: Tympanic   SpO2: 95%   Weight: 194 lb 9.6 oz (88.3 kg)   Height: 5' 5\" (1.651 m)   PainSc:   0 - No pain       Physical Exam   Constitutional: She is well-developed, well-nourished, and in no distress. HENT:   Head: Normocephalic.    Left Ear: External ear normal. Nose: Nose normal.   Mouth/Throat: Oropharynx is clear and moist.   Right ear Irrigation   Eyes: EOM are normal. Pupils are equal, round, and reactive to light. Neck: Normal range of motion. Cardiovascular: Normal rate, regular rhythm and normal heart sounds. Pulmonary/Chest: Effort normal and breath sounds normal.   Abdominal: Soft. Bowel sounds are normal.   Musculoskeletal: She exhibits tenderness (Tenderness and pain to L wrist.). Skin: Skin is warm and dry. No visits with results within 3 Month(s) from this visit. Latest known visit with results is:   Hospital Outpatient Visit on 03/08/2018   Component Date Value Ref Range Status    Special Requests: 03/08/2018 NO SPECIAL REQUESTS    Final    GRAM STAIN 03/08/2018 FEW WBC'S    Final    GRAM STAIN 03/08/2018 NO ORGANISMS SEEN    Final    Culture result: 03/08/2018 FEW STAPHYLOCOCCUS LUGDUNENSIS*   Final    Culture result: 03/08/2018 FEW DIPHTHEROIDS (TWO MORPHOTYPES)*   Final       .No results found for any visits on 12/03/18. Assessment / Plan:      ICD-10-CM ICD-9-CM    1. Dyslipidemia E78.5 272.4 LIPID PANEL   2. Obesity (BMI 30-39. 9) E66.9 278.00 naltrexone-buPROPion (CONTRAVE) 8-90 mg TbER ER tablet      CBC WITH AUTOMATED DIFF      METABOLIC PANEL, COMPREHENSIVE   3. History of asthma Z87.09 V12.69 fluticasone (FLONASE) 50 mcg/actuation nasal spray   4. Hypothyroidism, unspecified type E03.9 244.9 TSH 3RD GENERATION   5. Vitamin D deficiency E55.9 268.9 VITAMIN D, 25 HYDROXY       Prescription for Flonase nasal spray. Prescription for Contrave given. Labs-CBC, CMP, lipids, TSH, vitamin D. Patient followed by ENT for hearing. Patient being followed by cardiology. Follow-up Disposition: in 6 months    I asked the patient if she  had any questions and answered her  questions.   The patient stated that she understands the treatment plan and agrees with the treatment plan

## 2018-12-04 DIAGNOSIS — E78.5 DYSLIPIDEMIA: ICD-10-CM

## 2018-12-04 DIAGNOSIS — E55.9 VITAMIN D DEFICIENCY: Primary | ICD-10-CM

## 2018-12-04 LAB — 25(OH)D3 SERPL-MCNC: 17.1 NG/ML (ref 30–100)

## 2018-12-04 RX ORDER — ERGOCALCIFEROL 1.25 MG/1
50000 CAPSULE ORAL
Qty: 12 CAP | Refills: 0 | Status: SHIPPED | OUTPATIENT
Start: 2018-12-04 | End: 2019-03-01 | Stop reason: SDUPTHER

## 2018-12-04 RX ORDER — PRAVASTATIN SODIUM 40 MG/1
40 TABLET ORAL
Qty: 30 TAB | Refills: 1 | Status: SHIPPED | OUTPATIENT
Start: 2018-12-04 | End: 2018-12-04 | Stop reason: SDUPTHER

## 2018-12-04 NOTE — PROGRESS NOTES
Please let the patient know her cholesterol level is elevated and the Vitamin D. Level is low. She will need to start medications.

## 2018-12-05 ENCOUNTER — TELEPHONE (OUTPATIENT)
Dept: INTERNAL MEDICINE CLINIC | Age: 63
End: 2018-12-05

## 2018-12-05 NOTE — TELEPHONE ENCOUNTER
----- Message from Griselda Grewal NP sent at 12/4/2018  3:37 PM EST -----  Please let the patient know her cholesterol level is elevated and the Vitamin D. Level is low. She will need to start medications.

## 2018-12-05 NOTE — TELEPHONE ENCOUNTER
Contacted Brianna Marie and informed Her of lab results per Brooke Hinkle NP's request. Basil Strange Self expressed understanding.  In reference to Vit D being low and lipid panel being elevated and medications sent to pharmacy, Ms Self requested that labs be sent to her endocrinologist. Joanie Saldaña faxed to Dr Ashley Geller

## 2018-12-05 NOTE — TELEPHONE ENCOUNTER
I have attempted to contact this patient by phone with the following results: left message to return my call on answering machine. In reference to cholesterol level is elevated and the Vitamin D. Level is low. She will need to start medications.

## 2018-12-10 RX ORDER — PRAVASTATIN SODIUM 40 MG/1
TABLET ORAL
Qty: 90 TAB | Refills: 1 | Status: SHIPPED | OUTPATIENT
Start: 2018-12-10 | End: 2020-05-05 | Stop reason: SDUPTHER

## 2018-12-24 RX ORDER — MONTELUKAST SODIUM 10 MG/1
TABLET ORAL
Qty: 90 TAB | Refills: 0 | Status: SHIPPED | OUTPATIENT
Start: 2018-12-24 | End: 2019-03-27 | Stop reason: SDUPTHER

## 2019-01-08 DIAGNOSIS — E03.9 HYPOTHYROIDISM, UNSPECIFIED TYPE: ICD-10-CM

## 2019-01-08 DIAGNOSIS — E07.9 THYROID DISEASE: Primary | ICD-10-CM

## 2019-01-08 RX ORDER — LEVOTHYROXINE SODIUM 100 UG/1
TABLET ORAL
Qty: 90 TAB | Refills: 1 | Status: SHIPPED | OUTPATIENT
Start: 2019-01-08 | End: 2019-07-05 | Stop reason: SDUPTHER

## 2019-01-08 NOTE — TELEPHONE ENCOUNTER
Requested Prescriptions     Pending Prescriptions Disp Refills    levothyroxine (SYNTHROID) 100 mcg tablet  3

## 2019-02-04 ENCOUNTER — TELEPHONE (OUTPATIENT)
Dept: INTERNAL MEDICINE CLINIC | Age: 64
End: 2019-02-04

## 2019-02-06 ENCOUNTER — HOSPITAL ENCOUNTER (OUTPATIENT)
Dept: MAMMOGRAPHY | Age: 64
Discharge: HOME | End: 2019-02-06
Payer: COMMERCIAL

## 2019-02-06 ENCOUNTER — HOSPITAL ENCOUNTER (OUTPATIENT)
Dept: MAMMOGRAPHY | Age: 64
Discharge: HOME | End: 2019-02-06
Attending: INTERNAL MEDICINE
Payer: COMMERCIAL

## 2019-02-06 DIAGNOSIS — Z12.31 ENCOUNTER FOR SCREENING MAMMOGRAM FOR BREAST CANCER: ICD-10-CM

## 2019-02-06 PROCEDURE — 77063 BREAST TOMOSYNTHESIS BI: CPT

## 2019-02-07 ENCOUNTER — OFFICE VISIT (OUTPATIENT)
Dept: INTERNAL MEDICINE CLINIC | Age: 64
End: 2019-02-07

## 2019-02-07 VITALS
TEMPERATURE: 98.1 F | BODY MASS INDEX: 32.82 KG/M2 | HEART RATE: 84 BPM | RESPIRATION RATE: 16 BRPM | DIASTOLIC BLOOD PRESSURE: 80 MMHG | SYSTOLIC BLOOD PRESSURE: 124 MMHG | OXYGEN SATURATION: 96 % | HEIGHT: 65 IN | WEIGHT: 197 LBS

## 2019-02-07 DIAGNOSIS — R05.9 COUGH: Primary | ICD-10-CM

## 2019-02-07 DIAGNOSIS — R53.83 FATIGUE, UNSPECIFIED TYPE: ICD-10-CM

## 2019-02-07 NOTE — PROGRESS NOTES
Jenni Cali is a 62 y.o. female presenting today for Medication Reaction (pravastatin)  . HPI:  Alfred Marie presents to the office today for possible medication reaction. Patient was started on pravastatin 1 month ago and she is reporting that she started with a nonproductive nagging cough at night 1 week after the medication started. She notes that she wants to stop the medication. Patient last cholesterol panel was on 12/3/2018. Her cholesterol was 244 and her LDL was 155. Patient notes that she would like to stop the medication for least a couple months. She is feeling fatigued but denies any myalgia. Review of Systems   Constitutional: Positive for malaise/fatigue. Respiratory: Positive for cough. Negative for hemoptysis and sputum production. Cardiovascular: Negative for chest pain. Musculoskeletal: Negative for myalgias. Allergies   Allergen Reactions    Atorvastatin Other (comments)     Leg cramps and swelling    Sulfa (Sulfonamide Antibiotics) Hives       Current Outpatient Medications   Medication Sig Dispense Refill    levothyroxine (SYNTHROID) 100 mcg tablet TK 1 T PO QD 90 Tab 1    montelukast (SINGULAIR) 10 mg tablet TAKE 1 TABLET BY MOUTH DAILY 90 Tab 0    ergocalciferol (ERGOCALCIFEROL) 50,000 unit capsule Take 1 Cap by mouth every seven (7) days. 12 Cap 0    fluticasone (FLONASE) 50 mcg/actuation nasal spray SHAKE LIQUID AND USE 2 SPRAYS IN EACH NOSTRIL DAILY 1 Bottle 3    albuterol (PROVENTIL HFA, VENTOLIN HFA, PROAIR HFA) 90 mcg/actuation inhaler Take 2 Puffs by inhalation every four (4) hours as needed for Wheezing. 1 Inhaler 2    valACYclovir (VALTREX) 1 gram tablet Take 1 Tab by mouth two (2) times daily as needed. 20 Tab 0    cholecalciferol, VITAMIN D3, (VITAMIN D3) 5,000 unit tab tablet Take  by mouth daily.       pravastatin (PRAVACHOL) 40 mg tablet TAKE 1 TABLET BY MOUTH EVERY NIGHT 90 Tab 1    naltrexone-buPROPion (CONTRAVE) 8-90 mg TbER ER tablet Week 1 1 tab PO QAM, Week 2 1QAM 1QHS, Week 3 2QAM 1 QHS, Week 4 & beyond 2QAM 2QHS 162 Tab 0    montelukast (SINGULAIR) 10 mg tablet TAKE 1 TABLET BY MOUTH DAILY 90 Tab 0       Past Medical History:   Diagnosis Date    Asthma     childhood diagnosis; occasional inhaler use    Chest pain     Diverticulitis     Dyslipidemia     \"borderline\" per pt.  Endometriosis     GERD (gastroesophageal reflux disease)     Hypothyroidism     Normal nuclear stress test 09/14/2011    No ischemia or infarct. EF 75%. No WMA. Neg max EST w/nondiagnostic EKG changes and atypical chest discomfort at peak exercise.     Restless leg syndrome     Thyroid disease     Tobacco use disorder     ongoing       Past Surgical History:   Procedure Laterality Date    FOOT/TOES SURGERY PROC UNLISTED      HX HYSTERECTOMY      HX HYSTEROSCOPY      HX OVARIAN CYST REMOVAL      teenager    HX TUBAL LIGATION      30 years ago       Social History     Socioeconomic History    Marital status:      Spouse name: Not on file    Number of children: Not on file    Years of education: Not on file    Highest education level: Not on file   Social Needs    Financial resource strain: Not on file    Food insecurity - worry: Not on file    Food insecurity - inability: Not on file   [employer] needs - medical: Not on file   [employer] needs - non-medical: Not on file   Occupational History    Not on file   Tobacco Use    Smoking status: Current Every Day Smoker     Packs/day: 0.25     Years: 25.00     Pack years: 6.25    Smokeless tobacco: Never Used   Substance and Sexual Activity    Alcohol use: Yes     Comment: social    Drug use: No    Sexual activity: Yes     Partners: Male     Birth control/protection: None   Other Topics Concern    Not on file   Social History Narrative    Not on file       Patient does not have an advanced directive on file    Vitals:    02/07/19 1212   BP: 124/80   Pulse: 84   Resp: 16 Temp: 98.1 °F (36.7 °C)   TempSrc: Tympanic   SpO2: 96%   Weight: 197 lb (89.4 kg)   Height: 5' 5\" (1.651 m)   PainSc:   0 - No pain       Physical Exam   Constitutional: No distress. Cardiovascular: Normal rate, regular rhythm and normal heart sounds. Pulmonary/Chest: Effort normal and breath sounds normal.   Musculoskeletal: She exhibits no edema. Nursing note and vitals reviewed. [facility]    Hospital Outpatient Visit on 12/03/2018   Component Date Value Ref Range Status    WBC 12/03/2018 7.1  4.6 - 13.2 K/uL Final    RBC 12/03/2018 4.64  4.20 - 5.30 M/uL Final    HGB 12/03/2018 15.0  12.0 - 16.0 g/dL Final    HCT 12/03/2018 46.0* 35.0 - 45.0 % Final    MCV 12/03/2018 99.1* 74.0 - 97.0 FL Final    MCH 12/03/2018 32.3  24.0 - 34.0 PG Final    MCHC 12/03/2018 32.6  31.0 - 37.0 g/dL Final    RDW 12/03/2018 12.8  11.6 - 14.5 % Final    PLATELET 99/81/6055 527  135 - 420 K/uL Final    MPV 12/03/2018 9.2  9.2 - 11.8 FL Final    NEUTROPHILS 12/03/2018 61  40 - 73 % Final    LYMPHOCYTES 12/03/2018 30  21 - 52 % Final    MONOCYTES 12/03/2018 6  3 - 10 % Final    EOSINOPHILS 12/03/2018 2  0 - 5 % Final    BASOPHILS 12/03/2018 1  0 - 2 % Final    ABS. NEUTROPHILS 12/03/2018 4.3  1.8 - 8.0 K/UL Final    ABS. LYMPHOCYTES 12/03/2018 2.1  0.9 - 3.6 K/UL Final    ABS. MONOCYTES 12/03/2018 0.4  0.05 - 1.2 K/UL Final    ABS. EOSINOPHILS 12/03/2018 0.1  0.0 - 0.4 K/UL Final    ABS.  BASOPHILS 12/03/2018 0.1  0.0 - 0.1 K/UL Final    DF 12/03/2018 AUTOMATED    Final    Sodium 12/03/2018 142  136 - 145 mmol/L Final    Potassium 12/03/2018 4.5  3.5 - 5.5 mmol/L Final    Chloride 12/03/2018 107  100 - 108 mmol/L Final    CO2 12/03/2018 27  21 - 32 mmol/L Final    Anion gap 12/03/2018 8  3.0 - 18 mmol/L Final    Glucose 12/03/2018 100* 74 - 99 mg/dL Final    BUN 12/03/2018 13  7.0 - 18 MG/DL Final    Creatinine 12/03/2018 0.80  0.6 - 1.3 MG/DL Final    BUN/Creatinine ratio 12/03/2018 16  12 - 20   Final  GFR est AA 12/03/2018 >60  >60 ml/min/1.73m2 Final    GFR est non-AA 12/03/2018 >60  >60 ml/min/1.73m2 Final    Comment: (NOTE)  Estimated GFR is calculated using the Modification of Diet in Renal   Disease (MDRD) Study equation, reported for both  Americans   (GFRAA) and non- Americans (GFRNA), and normalized to 1.73m2   body surface area. The physician must decide which value applies to   the patient. The MDRD study equation should only be used in   individuals age 25 or older. It has not been validated for the   following: pregnant women, patients with serious comorbid conditions,   or on certain medications, or persons with extremes of body size,   muscle mass, or nutritional status.  Calcium 12/03/2018 9.8  8.5 - 10.1 MG/DL Final    Bilirubin, total 12/03/2018 0.5  0.2 - 1.0 MG/DL Final    ALT (SGPT) 12/03/2018 16  13 - 56 U/L Final    AST (SGOT) 12/03/2018 5* 15 - 37 U/L Final    Alk. phosphatase 12/03/2018 69  45 - 117 U/L Final    Protein, total 12/03/2018 7.0  6.4 - 8.2 g/dL Final    Albumin 12/03/2018 4.1  3.4 - 5.0 g/dL Final    Globulin 12/03/2018 2.9  2.0 - 4.0 g/dL Final    A-G Ratio 12/03/2018 1.4  0.8 - 1.7   Final    LIPID PROFILE 12/03/2018        Final    Cholesterol, total 12/03/2018 244* <200 MG/DL Final    Triglyceride 12/03/2018 173* <150 MG/DL Final    Comment: The drugs N-acetylcysteine (NAC) and  Metamiszole have been found to cause falsely  low results in this chemical assay. Please  be sure to submit blood samples obtained  BEFORE administration of either of these  drugs to assure correct results.       HDL Cholesterol 12/03/2018 54  40 - 60 MG/DL Final    LDL, calculated 12/03/2018 155.4* 0 - 100 MG/DL Final    VLDL, calculated 12/03/2018 34.6  MG/DL Final    CHOL/HDL Ratio 12/03/2018 4.5  0 - 5.0   Final    TSH 12/03/2018 0.88  0.36 - 3.74 uIU/mL Final    Vitamin D 25-Hydroxy 12/03/2018 17.1* 30 - 100 ng/mL Final    Comment: (NOTE)  Deficiency <20 ng/mL  Insufficiency          20-30 ng/mL  Sufficient             ng/mL  Possible toxicity       >100 ng/mL    The Method used is Siemens Advia Centaur currently standardized to a   Center of Disease Control and Prevention (CDC) certified reference   [address]. Samples containing fluorescein dye can produce falsely   elevated values when tested with the ADVIA Centaur Vitamin D Assay. It is recommended that results in the toxic range, >100 ng/mL, be   retested 72 hours post fluorescein exposure. .No results found for any visits on 02/07/19. Assessment / Plan:      ICD-10-CM ICD-9-CM    1. Cough R05 786.2    2. Fatigue, unspecified type R53.83 780.79      Cough-patient instructed to take antihistamine over-the-counter. Statin stop as per patient request.  Follow-up in March and will discuss possibly restarting the medication  Follow-up if no improvement or sooner if needed      Follow-up Disposition:  Return if symptoms worsen or fail to improve. I asked the patient if she  had any questions and answered her  questions.   The patient stated that she understands the treatment plan and agrees with the treatment plan

## 2019-02-07 NOTE — PATIENT INSTRUCTIONS
Low HDL Cholesterol: After Your Visit  Your Care Instructions  Cholesterol is a type of fat in your blood. It is needed for many body functions, such as making new cells. Cholesterol is made by your body and also comes from food you eat. HDL (high-density lipoprotein) is the \"good\" cholesterol. Low levels of HDL can increase your risk of having a heart attack or stroke. It is best if your HDL level is at least 40 (measured in milligrams per deciliter, or mg/dL). Low HDL usually is caused by a poor diet and a lack of exercise. You may raise your HDL level by eating less animal fat and more vegetables. Getting regular exercise can also help. But for some people, low HDL runs in the family. If changes in diet and exercise do not raise your HDL level, your doctor may recommend medicine. Follow-up care is a key part of your treatment and safety. Be sure to make and go to all appointments, and call your doctor if you are having problems. Its also a good idea to know your test results and keep a list of the medicines you take. How can you care for yourself at home? · Eat a healthy diet. Read food labels and try to avoid saturated fat and trans fat. ¨ Limit the amount of fatty meat and milk products you eat. Choose low-fat meats, such as skinless chicken breasts, and low-fat or nonfat dairy products. ¨ Bake, broil, grill, or steam foods instead of frying them. Avoid fried foods. ¨ Eat foods with whole grains, such as whole wheat bread, instead of white bread. Eat brown rice instead of white rice. ¨ Try not to eat liver and eggs. They contain a lot of \"bad\" cholesterol. Marylin Arriaza with oils such as olive, canola, or peanut oil. ¨ Eat beans and peas for protein. · Try to lose weight if you are overweight. · Get regular exercise. Even mild regular exercise alone may increase your HDL level. Walking is a good choice. Bit by bit, increase the amount you walk every day.  Try for at least 30 minutes on most days of the week. You also may want to swim, bike, or do other activities. · Stop smoking, which can lower your HDL level. If you need help quitting, talk to your doctor about stop-smoking programs and medicines. These can increase your chances of quitting for good. · Take your medicines exactly as prescribed. Call your doctor if you think you are having a problem with your medicine. When should you call for help? Call 911 anytime you think you may need emergency care. For example, call if:  · You have signs of a stroke. These may include:  ¨ Sudden numbness, paralysis, or weakness in your face, arm, or leg, especially on only one side of your body. ¨ New problems with walking or balance. ¨ Sudden vision changes. ¨ Drooling or slurred speech. ¨ New problems speaking or understanding simple statements, or feeling confused. ¨ A sudden, severe headache that is different from past headaches. · You have symptoms of a heart attack. These may include:  ¨ Chest pain or pressure, or a strange feeling in the chest.  ¨ Sweating. ¨ Shortness of breath. ¨ Nausea or vomiting. ¨ Pain, pressure, or a strange feeling in the back, neck, jaw, or upper belly or in one or both shoulders or arms. ¨ Lightheadedness or sudden weakness. ¨ A fast or irregular heartbeat. After you call 911, the  may tell you to chew 1 adult-strength or 2 to 4 low-dose aspirin. Wait for an ambulance. Do not try to drive yourself. Watch closely for changes in your health, and be sure to contact your doctor if:  · Your HDL level does not increase after making diet and exercise changes. · You are worried about your cholesterol level. · You do not get better as expected. Where can you learn more? Go to [employer]  Enter L121 in the search box to learn more about \"Low HDL Cholesterol: After Your Visit. \"   © 0196-2364 [employer].  Care instructions adapted under license by [employer] (which disclaims liability or warranty for this information). This care instruction is for use with your licensed healthcare professional. If you have questions about a medical condition or this instruction, always ask your healthcare professional. [address] any warranty or liability for your use of this information.   Content Version: 7.7.589420; Last Revised: October 13, 2011

## 2019-03-01 DIAGNOSIS — E55.9 VITAMIN D DEFICIENCY: ICD-10-CM

## 2019-03-01 RX ORDER — ERGOCALCIFEROL 1.25 MG/1
CAPSULE ORAL
Qty: 12 CAP | Refills: 0 | Status: SHIPPED | OUTPATIENT
Start: 2019-03-01 | End: 2019-10-23 | Stop reason: SDUPTHER

## 2019-03-27 RX ORDER — MONTELUKAST SODIUM 10 MG/1
TABLET ORAL
Qty: 90 TAB | Refills: 0 | Status: SHIPPED | OUTPATIENT
Start: 2019-03-27 | End: 2019-10-23 | Stop reason: SDUPTHER

## 2019-06-24 ENCOUNTER — HOSPITAL ENCOUNTER (EMERGENCY)
Age: 64
Discharge: HOME | End: 2019-06-24
Attending: EMERGENCY MEDICINE
Payer: COMMERCIAL

## 2019-06-24 ENCOUNTER — APPOINTMENT (OUTPATIENT)
Dept: GENERAL RADIOLOGY | Age: 64
End: 2019-06-24
Attending: EMERGENCY MEDICINE
Payer: COMMERCIAL

## 2019-06-24 VITALS
OXYGEN SATURATION: 96 % | SYSTOLIC BLOOD PRESSURE: 128 MMHG | DIASTOLIC BLOOD PRESSURE: 60 MMHG | HEART RATE: 58 BPM | TEMPERATURE: 98.2 F | RESPIRATION RATE: 13 BRPM

## 2019-06-24 DIAGNOSIS — R07.89 ATYPICAL CHEST PAIN: Primary | ICD-10-CM

## 2019-06-24 LAB
ALBUMIN SERPL-MCNC: 3.9 G/DL (ref 3.4–5)
ALBUMIN/GLOB SERPL: 1.1 {RATIO} (ref 0.8–1.7)
ALP SERPL-CCNC: 79 U/L (ref 45–117)
ALT SERPL-CCNC: 17 U/L (ref 13–56)
ANION GAP SERPL CALC-SCNC: 4 MMOL/L (ref 3–18)
AST SERPL-CCNC: 7 U/L (ref 15–37)
ATRIAL RATE: 66 BPM
BASOPHILS # BLD: 0.1 K/UL (ref 0–0.1)
BASOPHILS NFR BLD: 1 % (ref 0–2)
BILIRUB SERPL-MCNC: 0.3 MG/DL (ref 0.2–1)
BUN SERPL-MCNC: 11 MG/DL (ref 7–18)
BUN/CREAT SERPL: 17 (ref 12–20)
CALCIUM SERPL-MCNC: 9.2 MG/DL (ref 8.5–10.1)
CALCULATED P AXIS, ECG09: 70 DEGREES
CALCULATED R AXIS, ECG10: 10 DEGREES
CALCULATED T AXIS, ECG11: 59 DEGREES
CHLORIDE SERPL-SCNC: 107 MMOL/L (ref 100–108)
CK MB CFR SERPL CALC: NORMAL % (ref 0–4)
CK MB SERPL-MCNC: <1 NG/ML (ref 5–25)
CK SERPL-CCNC: 41 U/L (ref 26–192)
CO2 SERPL-SCNC: 27 MMOL/L (ref 21–32)
CREAT SERPL-MCNC: 0.65 MG/DL (ref 0.6–1.3)
DIAGNOSIS, 93000: NORMAL
DIFFERENTIAL METHOD BLD: ABNORMAL
EOSINOPHIL # BLD: 0.1 K/UL (ref 0–0.4)
EOSINOPHIL NFR BLD: 1 % (ref 0–5)
ERYTHROCYTE [DISTWIDTH] IN BLOOD BY AUTOMATED COUNT: 12.3 % (ref 11.6–14.5)
GLOBULIN SER CALC-MCNC: 3.6 G/DL (ref 2–4)
GLUCOSE SERPL-MCNC: 93 MG/DL (ref 74–99)
HCT VFR BLD AUTO: 42.8 % (ref 35–45)
HGB BLD-MCNC: 14.9 G/DL (ref 12–16)
LIPASE SERPL-CCNC: 149 U/L (ref 73–393)
LYMPHOCYTES # BLD: 2.1 K/UL (ref 0.9–3.6)
LYMPHOCYTES NFR BLD: 29 % (ref 21–52)
MAGNESIUM SERPL-MCNC: 2.2 MG/DL (ref 1.6–2.6)
MCH RBC QN AUTO: 32 PG (ref 24–34)
MCHC RBC AUTO-ENTMCNC: 34.8 G/DL (ref 31–37)
MCV RBC AUTO: 92 FL (ref 74–97)
MONOCYTES # BLD: 0.4 K/UL (ref 0.05–1.2)
MONOCYTES NFR BLD: 5 % (ref 3–10)
NEUTS SEG # BLD: 4.6 K/UL (ref 1.8–8)
NEUTS SEG NFR BLD: 64 % (ref 40–73)
P-R INTERVAL, ECG05: 142 MS
PLATELET # BLD AUTO: 231 K/UL (ref 135–420)
PMV BLD AUTO: 8.8 FL (ref 9.2–11.8)
POTASSIUM SERPL-SCNC: 4.3 MMOL/L (ref 3.5–5.5)
PROT SERPL-MCNC: 7.5 G/DL (ref 6.4–8.2)
Q-T INTERVAL, ECG07: 414 MS
QRS DURATION, ECG06: 92 MS
QTC CALCULATION (BEZET), ECG08: 434 MS
RBC # BLD AUTO: 4.65 M/UL (ref 4.2–5.3)
SODIUM SERPL-SCNC: 138 MMOL/L (ref 136–145)
TROPONIN I SERPL-MCNC: <0.02 NG/ML (ref 0–0.04)
VENTRICULAR RATE, ECG03: 66 BPM
WBC # BLD AUTO: 7.2 K/UL (ref 4.6–13.2)

## 2019-06-24 PROCEDURE — 93005 ELECTROCARDIOGRAM TRACING: CPT

## 2019-06-24 PROCEDURE — 82550 ASSAY OF CK (CPK): CPT

## 2019-06-24 PROCEDURE — 85025 COMPLETE CBC W/AUTO DIFF WBC: CPT

## 2019-06-24 PROCEDURE — 80053 COMPREHEN METABOLIC PANEL: CPT

## 2019-06-24 PROCEDURE — 71046 X-RAY EXAM CHEST 2 VIEWS: CPT

## 2019-06-24 PROCEDURE — 83690 ASSAY OF LIPASE: CPT

## 2019-06-24 PROCEDURE — 74011250636 HC RX REV CODE- 250/636: Performed by: EMERGENCY MEDICINE

## 2019-06-24 PROCEDURE — 96374 THER/PROPH/DIAG INJ IV PUSH: CPT

## 2019-06-24 PROCEDURE — 83735 ASSAY OF MAGNESIUM: CPT

## 2019-06-24 PROCEDURE — 99284 EMERGENCY DEPT VISIT MOD MDM: CPT

## 2019-06-24 PROCEDURE — 74011250637 HC RX REV CODE- 250/637: Performed by: EMERGENCY MEDICINE

## 2019-06-24 RX ORDER — KETOROLAC TROMETHAMINE 30 MG/ML
30 INJECTION, SOLUTION INTRAMUSCULAR; INTRAVENOUS
Status: COMPLETED | OUTPATIENT
Start: 2019-06-24 | End: 2019-06-24

## 2019-06-24 RX ORDER — CYCLOBENZAPRINE HCL 10 MG
10 TABLET ORAL
Qty: 15 TAB | Refills: 0 | Status: SHIPPED | OUTPATIENT
Start: 2019-06-24 | End: 2022-03-10

## 2019-06-24 RX ORDER — ACETAMINOPHEN 325 MG/1
650 TABLET ORAL
Status: COMPLETED | OUTPATIENT
Start: 2019-06-24 | End: 2019-06-24

## 2019-06-24 RX ORDER — IBUPROFEN 600 MG/1
600 TABLET ORAL
Qty: 20 TAB | Refills: 0 | Status: SHIPPED | OUTPATIENT
Start: 2019-06-24 | End: 2022-03-10

## 2019-06-24 RX ORDER — CYCLOBENZAPRINE HCL 10 MG
10 TABLET ORAL
Status: COMPLETED | OUTPATIENT
Start: 2019-06-24 | End: 2019-06-24

## 2019-06-24 RX ADMIN — KETOROLAC TROMETHAMINE 30 MG: 30 INJECTION, SOLUTION INTRAMUSCULAR; INTRAVENOUS at 14:44

## 2019-06-24 RX ADMIN — ACETAMINOPHEN 650 MG: 325 TABLET ORAL at 12:43

## 2019-06-24 RX ADMIN — CYCLOBENZAPRINE HYDROCHLORIDE 10 MG: 10 TABLET, FILM COATED ORAL at 12:43

## 2019-06-24 NOTE — ED TRIAGE NOTES
Patient arrived to ER via EMS for complaint of left sided flank pain that radiates to her ribs and back. Patient states the pain started today and rates the pain 8/10.

## 2019-06-24 NOTE — DISCHARGE INSTRUCTIONS

## 2019-06-24 NOTE — ED PROVIDER NOTES
EMERGENCY DEPARTMENT HISTORY AND PHYSICAL EXAM    12:22 PM      Date: 6/24/2019  Patient Name: Diana Marie    History of Presenting Illness     Chief Complaint   Patient presents with    Flank Pain         History Provided By: Patient  Location/Duration/Severity/Modifying factors   Patient is 52-year-old female with a history of thyroid disease the presents emergency department with a complaint of left-sided chest pain that began last evening and is worsened this morning. Patient notes she has pain with deep inspiration and movement of the left chest radiates to her back. Patient is a smoker however denies any cardiac history. Patient has been seen by cardiologist in the past and reassuring work-up according to the patient. Patient has not anything to eat today. Patient denies any history of DVT, denies hormone replacement therapy, denies recent travel, denies any calf pain. Patient denies any fevers or chills or cough. Patient does have a significant family history of heart disease in both of her brothers have had bypass surgery.             PCP: Verner Barrio, MD    Current Facility-Administered Medications   Medication Dose Route Frequency Provider Last Rate Last Dose    ketorolac (TORADOL) injection 30 mg  30 mg IntraVENous NOW Ken Rose MD         Current Outpatient Medications   Medication Sig Dispense Refill    montelukast (SINGULAIR) 10 mg tablet TAKE 1 TABLET BY MOUTH DAILY 90 Tab 0    ergocalciferol (ERGOCALCIFEROL) 50,000 unit capsule TAKE 1 CAPSULE BY MOUTH EVERY 7 DAYS 12 Cap 0    levothyroxine (SYNTHROID) 100 mcg tablet TK 1 T PO QD 90 Tab 1    pravastatin (PRAVACHOL) 40 mg tablet TAKE 1 TABLET BY MOUTH EVERY NIGHT 90 Tab 1    naltrexone-buPROPion (CONTRAVE) 8-90 mg TbER ER tablet Week 1 1 tab PO QAM, Week 2 1QAM 1QHS, Week 3 2QAM 1 QHS, Week 4 & beyond 2QAM 2QHS 162 Tab 0    fluticasone (FLONASE) 50 mcg/actuation nasal spray SHAKE LIQUID AND USE 2 SPRAYS IN EACH NOSTRIL DAILY 1 Bottle 3    montelukast (SINGULAIR) 10 mg tablet TAKE 1 TABLET BY MOUTH DAILY 90 Tab 0    albuterol (PROVENTIL HFA, VENTOLIN HFA, PROAIR HFA) 90 mcg/actuation inhaler Take 2 Puffs by inhalation every four (4) hours as needed for Wheezing. 1 Inhaler 2    valACYclovir (VALTREX) 1 gram tablet Take 1 Tab by mouth two (2) times daily as needed. 20 Tab 0    cholecalciferol, VITAMIN D3, (VITAMIN D3) 5,000 unit tab tablet Take  by mouth daily. Past History     Past Medical History:  Past Medical History:   Diagnosis Date    Asthma     childhood diagnosis; occasional inhaler use    Chest pain     Diverticulitis     Dyslipidemia     \"borderline\" per pt.  Endometriosis     GERD (gastroesophageal reflux disease)     Hypothyroidism     Normal nuclear stress test 09/14/2011    No ischemia or infarct. EF 75%. No WMA. Neg max EST w/nondiagnostic EKG changes and atypical chest discomfort at peak exercise.  Restless leg syndrome     Thyroid disease     Tobacco use disorder     ongoing       Past Surgical History:  Past Surgical History:   Procedure Laterality Date    FOOT/TOES SURGERY PROC UNLISTED      HX HYSTERECTOMY      HX HYSTEROSCOPY      HX OVARIAN CYST REMOVAL      teenager    HX TUBAL LIGATION      30 years ago       Family History:  Family History   Problem Relation Age of Onset    Lung Disease Mother     Cancer Father     Cancer Sister 52        breast cancer    Asthma Sister     Stroke Sister     Asthma Brother     Heart Disease Brother     Hypertension Brother        Social History:  Social History     Tobacco Use    Smoking status: Current Every Day Smoker     Packs/day: 0.25     Years: 25.00     Pack years: 6.25    Smokeless tobacco: Never Used   Substance Use Topics    Alcohol use: Yes     Comment: social    Drug use: No       Allergies:   Allergies   Allergen Reactions    Atorvastatin Other (comments)     Leg cramps and swelling    Sulfa (Sulfonamide Antibiotics) Hives         Review of Systems       Review of Systems   Constitutional: Negative for activity change, fatigue and fever. HENT: Negative for congestion and rhinorrhea. Eyes: Negative for visual disturbance. Respiratory: Negative for shortness of breath. Cardiovascular: Positive for chest pain. Negative for palpitations. Gastrointestinal: Positive for nausea. Negative for abdominal pain, diarrhea and vomiting. Genitourinary: Negative for dysuria and hematuria. Musculoskeletal: Negative for back pain. Skin: Negative for rash. Neurological: Negative for dizziness, weakness and light-headedness. All other systems reviewed and are negative. Physical Exam     Visit Vitals  /53 (BP 1 Location: Left arm, BP Patient Position: At rest)   Pulse 68   Temp 98.2 °F (36.8 °C)   Resp 12   SpO2 94%         Physical Exam   Constitutional: She is oriented to person, place, and time. She appears well-developed and well-nourished. No distress. HENT:   Head: Normocephalic and atraumatic. Right Ear: External ear normal.   Left Ear: External ear normal.   Nose: Nose normal.   Mouth/Throat: Oropharynx is clear and moist.   Eyes: Pupils are equal, round, and reactive to light. Conjunctivae and EOM are normal. No scleral icterus. Neck: Normal range of motion. Neck supple. No JVD present. No tracheal deviation present. No thyromegaly present. Cardiovascular: Normal rate, regular rhythm, normal heart sounds and intact distal pulses. Exam reveals no gallop and no friction rub. No murmur heard. Pulmonary/Chest: Effort normal and breath sounds normal. She exhibits tenderness. Chest pain on palpation along left costal margin along the rib to the paraspinal thoracic region along ribs 7 and 8  No rash noted   Abdominal: Soft. Bowel sounds are normal. She exhibits no distension. There is no tenderness. There is no rebound and no guarding. Musculoskeletal: Normal range of motion.  She exhibits no edema or tenderness. Lymphadenopathy:     She has no cervical adenopathy. Neurological: She is alert and oriented to person, place, and time. No cranial nerve deficit. Coordination normal.   No sensory loss, Gait normal, Motor 5/5   Skin: Skin is warm and dry. Psychiatric: She has a normal mood and affect. Her behavior is normal. Judgment and thought content normal.   Supportive  at the bedside   Nursing note and vitals reviewed. Diagnostic Study Results     Labs -  Recent Results (from the past 12 hour(s))   EKG, 12 LEAD, INITIAL    Collection Time: 06/24/19 12:37 PM   Result Value Ref Range    Ventricular Rate 66 BPM    Atrial Rate 66 BPM    P-R Interval 142 ms    QRS Duration 92 ms    Q-T Interval 414 ms    QTC Calculation (Bezet) 434 ms    Calculated P Axis 70 degrees    Calculated R Axis 10 degrees    Calculated T Axis 59 degrees    Diagnosis       Normal sinus rhythm  Normal ECG  When compared with ECG of 01-JUN-2017 15:05,  No significant change was found  Confirmed by Abdelrahman De Souza MD, ----- ([id number]) on 6/24/2019 1:00:35 PM     CBC WITH AUTOMATED DIFF    Collection Time: 06/24/19 12:44 PM   Result Value Ref Range    WBC 7.2 4.6 - 13.2 K/uL    RBC 4.65 4.20 - 5.30 M/uL    HGB 14.9 12.0 - 16.0 g/dL    HCT 42.8 35.0 - 45.0 %    MCV 92.0 74.0 - 97.0 FL    MCH 32.0 24.0 - 34.0 PG    MCHC 34.8 31.0 - 37.0 g/dL    RDW 12.3 11.6 - 14.5 %    PLATELET 995 274 - 318 K/uL    MPV 8.8 (L) 9.2 - 11.8 FL    NEUTROPHILS 64 40 - 73 %    LYMPHOCYTES 29 21 - 52 %    MONOCYTES 5 3 - 10 %    EOSINOPHILS 1 0 - 5 %    BASOPHILS 1 0 - 2 %    ABS. NEUTROPHILS 4.6 1.8 - 8.0 K/UL    ABS. LYMPHOCYTES 2.1 0.9 - 3.6 K/UL    ABS. MONOCYTES 0.4 0.05 - 1.2 K/UL    ABS. EOSINOPHILS 0.1 0.0 - 0.4 K/UL    ABS.  BASOPHILS 0.1 0.0 - 0.1 K/UL    DF AUTOMATED     METABOLIC PANEL, COMPREHENSIVE    Collection Time: 06/24/19 12:44 PM   Result Value Ref Range    Sodium 138 136 - 145 mmol/L    Potassium 4.3 3.5 - 5.5 mmol/L    Chloride 107 100 - 108 mmol/L    CO2 27 21 - 32 mmol/L    Anion gap 4 3.0 - 18 mmol/L    Glucose 93 74 - 99 mg/dL    BUN 11 7.0 - 18 MG/DL    Creatinine 0.65 0.6 - 1.3 MG/DL    BUN/Creatinine ratio 17 12 - 20      GFR est AA >60 >60 ml/min/1.73m2    GFR est non-AA >60 >60 ml/min/1.73m2    Calcium 9.2 8.5 - 10.1 MG/DL    Bilirubin, total 0.3 0.2 - 1.0 MG/DL    ALT (SGPT) 17 13 - 56 U/L    AST (SGOT) 7 (L) 15 - 37 U/L    Alk. phosphatase 79 45 - 117 U/L    Protein, total 7.5 6.4 - 8.2 g/dL    Albumin 3.9 3.4 - 5.0 g/dL    Globulin 3.6 2.0 - 4.0 g/dL    A-G Ratio 1.1 0.8 - 1.7     LIPASE    Collection Time: 06/24/19 12:44 PM   Result Value Ref Range    Lipase 149 73 - 393 U/L   MAGNESIUM    Collection Time: 06/24/19 12:44 PM   Result Value Ref Range    Magnesium 2.2 1.6 - 2.6 mg/dL   CARDIAC PANEL,(CK, CKMB & TROPONIN)    Collection Time: 06/24/19 12:44 PM   Result Value Ref Range    CK 41 26 - 192 U/L    CK - MB <1.0 <3.6 ng/ml    CK-MB Index  0.0 - 4.0 %     CALCULATION NOT PERFORMED WHEN RESULT IS BELOW LINEAR LIMIT    Troponin-I, QT <0.02 0.0 - 0.045 NG/ML       Radiologic Studies -   XR CHEST PA LAT   Final Result   Impression:   1. No acute process. Medical Decision Making   I am the first provider for this patient. I reviewed the vital signs, available nursing notes, past medical history, past surgical history, family history and social history. Vital Signs-Reviewed the patient's vital signs. EKG: Normal sinus rhythm 66 no STEMI    Records Reviewed: Nursing Notes and Old Medical Records (Time of Review: 12:22 PM)    ED Course: Progress Notes, Reevaluation, and Consults:    Patient's chest x-ray and cardiac labs are reassuring her heart score is 2 and is been having pain since last evening. After one set of cardiac markers I feel it is reasonable to let her follow-up as an outpatient to follow closely with her PMD and return if you worsened or concerned.     Workup and recommendations were reviewed with the patient and all questions were answered. The patient understands the plan and will proceed with close outpatient care. I have encouraged the patient to return if at all worsened or concerned. Rula Castelan, DO 2:17 PM      Provider Notes (Medical Decision Making):   MDM  Number of Diagnoses or Management Options  Diagnosis management comments: Patient is 60-year-old female with a history of thyroid disease the presents emergency department with complaint of left-sided chest pain that appears to be along the costal margin and ribs. Clinically suspect is a rib strain but she does have a significant family history and is a smoker so will obtain cardiac labs, EKG, chest x-ray, and reevaluate. Rula Castelan, DO 12:26 PM        Procedures        Diagnosis     Clinical Impression:   1. Atypical chest pain        Disposition: DC    Follow-up Information    None          Patient's Medications   Start Taking    No medications on file   Continue Taking    ALBUTEROL (PROVENTIL HFA, VENTOLIN HFA, PROAIR HFA) 90 MCG/ACTUATION INHALER    Take 2 Puffs by inhalation every four (4) hours as needed for Wheezing. CHOLECALCIFEROL, VITAMIN D3, (VITAMIN D3) 5,000 UNIT TAB TABLET    Take  by mouth daily.     ERGOCALCIFEROL (ERGOCALCIFEROL) 50,000 UNIT CAPSULE    TAKE 1 CAPSULE BY MOUTH EVERY 7 DAYS    FLUTICASONE (FLONASE) 50 MCG/ACTUATION NASAL SPRAY    SHAKE LIQUID AND USE 2 SPRAYS IN EACH NOSTRIL DAILY    LEVOTHYROXINE (SYNTHROID) 100 MCG TABLET    TK 1 T PO QD    MONTELUKAST (SINGULAIR) 10 MG TABLET    TAKE 1 TABLET BY MOUTH DAILY    MONTELUKAST (SINGULAIR) 10 MG TABLET    TAKE 1 TABLET BY MOUTH DAILY    NALTREXONE-BUPROPION (CONTRAVE) 8-90 MG TBER ER TABLET    Week 1 1 tab PO QAM, Week 2 1QAM 1QHS, Week 3 2QAM 1 QHS, Week 4 & beyond 2QAM 2QHS    PRAVASTATIN (PRAVACHOL) 40 MG TABLET    TAKE 1 TABLET BY MOUTH EVERY NIGHT    VALACYCLOVIR (VALTREX) 1 GRAM TABLET    Take 1 Tab by mouth two (2) times daily as needed. These Medications have changed    No medications on file   Stop Taking    No medications on file     Disclaimer: Sections of this note are dictated using utilizing voice recognition software. Minor typographical errors may be present. If questions arise, please do not hesitate to contact me or call our department.

## 2019-07-05 DIAGNOSIS — E03.9 HYPOTHYROIDISM, UNSPECIFIED TYPE: ICD-10-CM

## 2019-07-07 RX ORDER — LEVOTHYROXINE SODIUM 100 UG/1
TABLET ORAL
Qty: 90 TAB | Refills: 0 | Status: SHIPPED | OUTPATIENT
Start: 2019-07-07 | End: 2019-10-20 | Stop reason: SDUPTHER

## 2019-08-30 ENCOUNTER — OFFICE VISIT (OUTPATIENT)
Dept: SURGERY | Age: 64
End: 2019-08-30

## 2019-08-30 VITALS
HEART RATE: 60 BPM | OXYGEN SATURATION: 98 % | SYSTOLIC BLOOD PRESSURE: 128 MMHG | WEIGHT: 197 LBS | DIASTOLIC BLOOD PRESSURE: 80 MMHG | HEIGHT: 65 IN | RESPIRATION RATE: 16 BRPM | BODY MASS INDEX: 32.82 KG/M2

## 2019-08-30 DIAGNOSIS — L72.3 INFECTED SEBACEOUS CYST: Primary | ICD-10-CM

## 2019-08-30 DIAGNOSIS — L08.9 INFECTED SEBACEOUS CYST: Primary | ICD-10-CM

## 2019-09-04 NOTE — PROGRESS NOTES
Progress Note    Patient: Julianne Sharpe  MRN: [id number]  SSN: [id number]   YOB: 1955  Age: 62 y.o. Sex: female     Chief Complaint   Patient presents with    Cyst     on chest       HPI    Ms. zhao is well-known patient to me for several sebaceous cysts. Today she is presenting with an infected sebaceous cyst on the top of her abdominal wall in her epigastrium. It is red and cellulitic and needs an incision and drainage. She is had no other constitutional complaints about it however. While it is quite tender she has had no sepsis, or fever and chills. Past Medical History:   Diagnosis Date    Asthma     childhood diagnosis; occasional inhaler use    Chest pain     Diverticulitis     Dyslipidemia     \"borderline\" per pt.  Endometriosis     GERD (gastroesophageal reflux disease)     Hypothyroidism     Normal nuclear stress test 09/14/2011    No ischemia or infarct. EF 75%. No WMA. Neg max EST w/nondiagnostic EKG changes and atypical chest discomfort at peak exercise.  Restless leg syndrome     Thyroid disease     Tobacco use disorder     ongoing     Past Surgical History:   Procedure Laterality Date    FOOT/TOES SURGERY PROC UNLISTED      HX HYSTERECTOMY      HX HYSTEROSCOPY      HX OVARIAN CYST REMOVAL      teenager    HX TUBAL LIGATION      30 years ago     Allergies   Allergen Reactions    Atorvastatin Other (comments)     Leg cramps and swelling    Sulfa (Sulfonamide Antibiotics) Hives     Current Outpatient Medications   Medication Sig Dispense Refill    levothyroxine (SYNTHROID) 100 mcg tablet TAKE 1 TABLET BY MOUTH EVERY DAY 90 Tab 0    ibuprofen (MOTRIN) 600 mg tablet Take 1 Tab by mouth every six (6) hours as needed for Pain. 20 Tab 0    cyclobenzaprine (FLEXERIL) 10 mg tablet Take 1 Tab by mouth three (3) times daily as needed for Muscle Spasm(s).  15 Tab 0    montelukast (SINGULAIR) 10 mg tablet TAKE 1 TABLET BY MOUTH DAILY 90 Tab 0    ergocalciferol (ERGOCALCIFEROL) 50,000 unit capsule TAKE 1 CAPSULE BY MOUTH EVERY 7 DAYS 12 Cap 0    pravastatin (PRAVACHOL) 40 mg tablet TAKE 1 TABLET BY MOUTH EVERY NIGHT 90 Tab 1    naltrexone-buPROPion (CONTRAVE) 8-90 mg TbER ER tablet Week 1 1 tab PO QAM, Week 2 1QAM 1QHS, Week 3 2QAM 1 QHS, Week 4 & beyond 2QAM 2QHS 162 Tab 0    fluticasone (FLONASE) 50 mcg/actuation nasal spray SHAKE LIQUID AND USE 2 SPRAYS IN EACH NOSTRIL DAILY 1 Bottle 3    montelukast (SINGULAIR) 10 mg tablet TAKE 1 TABLET BY MOUTH DAILY 90 Tab 0    albuterol (PROVENTIL HFA, VENTOLIN HFA, PROAIR HFA) 90 mcg/actuation inhaler Take 2 Puffs by inhalation every four (4) hours as needed for Wheezing. 1 Inhaler 2    valACYclovir (VALTREX) 1 gram tablet Take 1 Tab by mouth two (2) times daily as needed. 20 Tab 0    cholecalciferol, VITAMIN D3, (VITAMIN D3) 5,000 unit tab tablet Take  by mouth daily.        Social History     Socioeconomic History    Marital status:      Spouse name: Not on file    Number of children: Not on file    Years of education: Not on file    Highest education level: Not on file   Occupational History    Not on file   Social Needs    Financial resource strain: Not on file    Food insecurity:     Worry: Not on file     Inability: Not on file    Transportation needs:     Medical: Not on file     Non-medical: Not on file   Tobacco Use    Smoking status: Current Every Day Smoker     Packs/day: 0.25     Years: 25.00     Pack years: 6.25    Smokeless tobacco: Never Used   Substance and Sexual Activity    Alcohol use: Yes     Comment: social    Drug use: No    Sexual activity: Yes     Partners: Male     Birth control/protection: None   Lifestyle    Physical activity:     Days per week: Not on file     Minutes per session: Not on file    Stress: Not on file   Relationships    Social connections:     Talks on phone: Not on file     Gets together: Not on file     Attends Spiritism service: Not on file     Active member of club or organization: Not on file     Attends meetings of clubs or organizations: Not on file     Relationship status: Not on file    Intimate partner violence:     Fear of current or ex partner: Not on file     Emotionally abused: Not on file     Physically abused: Not on file     Forced sexual activity: Not on file   Other Topics Concern    Not on file   Social History Narrative    Not on file     Family History   Problem Relation Age of Onset    Lung Disease Mother    [address] [facility] Father    [address] [facility] Sister 52        breast cancer    Asthma Sister     Stroke Sister     Asthma Brother     Heart Disease Brother     Hypertension Brother          Review of systems:  Patient denies any reflux, emesis, abdominal pain, change in bowel habits, hematochezia, melena, fever, weight loss, fatigue chills, dermatitis, abnormal moles, change in vision, vertigo, epistaxis, dysphagia, hoarseness, chest pain, palpitations, hypertension, edema, cough, shortness of breath, wheezing, hemoptysis, snoring, hematuria, diabetes, thyroid disease, anemia, bruising, history of blood transfusion, dizziness, headache, or fainting.     Physical Examination    Well developed well nourished female in no apparent distress  Visit Vitals  /80   Pulse 60   Resp 16   Ht 5' 5\" (1.651 m)   Wt 89.4 kg (197 lb)   SpO2 98%   BMI 32.78 kg/m²      Head: normocephalic, atraumatic  Mouth: Clear, no overt lesions, oral mucosa pink and moist  Neck: supple, no masses, no adenopathy or carotid bruits, trachea midline  Resp: clear to auscultation bilaterally, no wheeze, rhonchi or rales, excursions normal and symmetrical  Cardio: Regular rate and rhythm, no murmurs, clicks, gallops or rubs, no edema or varicosities  Abdomen: soft, nontender, nondistended, normoactive bowel sounds, no hernias, no hepatosplenomegaly,   Back: Deferred  Extremeties: warm, well-perfused, no tenderness or swelling, normal gait/station  Neuro: sensation and strength grossly intact and symmetrical  Psych: alert and oriented to person, place and time  Breast exam deferred     IMPRESSION  Infected sebaceous cyst    PLAN  No orders of the defined types were placed in this encounter. Incision and drainage of cyst with antibiotics  Anika Thornton MD         Procedure note  Ms. zhao was taken to the procedure room and after proper timeout and identification of the surgical site her abdominal wall was prepped and draped with Betadine. It was anesthetized with 2% lidocaine. An incision and drainage was able to remove a large volume of sebum. The wound was cleaned and packed. She tolerated it well.

## 2019-09-25 ENCOUNTER — OFFICE VISIT (OUTPATIENT)
Dept: FAMILY MEDICINE CLINIC | Facility: CLINIC | Age: 64
End: 2019-09-25

## 2019-09-25 VITALS
RESPIRATION RATE: 12 BRPM | WEIGHT: 194 LBS | TEMPERATURE: 98 F | SYSTOLIC BLOOD PRESSURE: 120 MMHG | OXYGEN SATURATION: 100 % | DIASTOLIC BLOOD PRESSURE: 56 MMHG | HEART RATE: 80 BPM | BODY MASS INDEX: 32.32 KG/M2 | HEIGHT: 65 IN

## 2019-09-25 NOTE — PROGRESS NOTES
Brianna Marie presents today for Chief Complaint Patient presents with  Physical  
 
 
Is someone accompanying this pt? no 
 
Is the patient using any DME equipment during OV? no 
 
Depression Screening: 
3 most recent PHQ Screens 9/25/2019 Little interest or pleasure in doing things Not at all Feeling down, depressed, irritable, or hopeless Not at all Total Score PHQ 2 0 Learning Assessment: 
Learning Assessment 5/18/2018 PRIMARY LEARNER Patient BARRIERS PRIMARY LEARNER NONE PRIMARY LANGUAGE ENGLISH  
LEARNER PREFERENCE PRIMARY READING  
  LISTENING  
  -  
ANSWERED BY self RELATIONSHIP SELF Abuse Screening: No flowsheet data found. Fall Risk No flowsheet data found. Health Maintenance reviewed and discussed and ordered per Provider. Health Maintenance Due Topic Date Due  
 Hepatitis C Screening  1955  Pneumococcal 0-64 years (1 of 1 - PPSV23) 02/05/1962  PAP AKA CERVICAL CYTOLOGY  02/05/1977  Shingrix Vaccine Age 50> (1 of 2) 02/05/2006  Influenza Age 5 to Adult  08/01/2019 Coordination of Care: 1. Have you been to the ER, urgent care clinic since your last visit? Hospitalized since your last visit? yes 2. Have you seen or consulted any other health care providers outside of the [address] [place] [address] since your last visit? Include any pap smears or colon screening.  no

## 2019-10-20 DIAGNOSIS — E03.9 HYPOTHYROIDISM, UNSPECIFIED TYPE: ICD-10-CM

## 2019-10-20 RX ORDER — LEVOTHYROXINE SODIUM 100 UG/1
TABLET ORAL
Qty: 90 TAB | Refills: 0 | Status: SHIPPED | OUTPATIENT
Start: 2019-10-20 | End: 2020-01-21 | Stop reason: SDUPTHER

## 2019-10-23 DIAGNOSIS — Z87.09 HISTORY OF ASTHMA: ICD-10-CM

## 2019-10-23 DIAGNOSIS — E55.9 VITAMIN D DEFICIENCY: ICD-10-CM

## 2019-10-23 RX ORDER — MONTELUKAST SODIUM 10 MG/1
TABLET ORAL
Qty: 90 TAB | Refills: 0 | Status: SHIPPED | OUTPATIENT
Start: 2019-10-23 | End: 2020-02-07

## 2019-10-23 RX ORDER — ERGOCALCIFEROL 1.25 MG/1
CAPSULE ORAL
Qty: 12 CAP | Refills: 0 | Status: SHIPPED | OUTPATIENT
Start: 2019-10-23 | End: 2020-01-27 | Stop reason: SDUPTHER

## 2019-10-23 RX ORDER — FLUTICASONE PROPIONATE 50 MCG
SPRAY, SUSPENSION (ML) NASAL
Qty: 1 BOTTLE | Refills: 3 | Status: SHIPPED | OUTPATIENT
Start: 2019-10-23 | End: 2019-10-30 | Stop reason: SDUPTHER

## 2019-10-27 DIAGNOSIS — E78.5 DYSLIPIDEMIA: ICD-10-CM

## 2019-10-29 RX ORDER — PRAVASTATIN SODIUM 40 MG/1
TABLET ORAL
Qty: 30 TAB | Refills: 0 | Status: SHIPPED | OUTPATIENT
Start: 2019-10-29 | End: 2019-12-03 | Stop reason: SDUPTHER

## 2019-10-29 NOTE — TELEPHONE ENCOUNTER
Requested Prescriptions     Pending Prescriptions Disp Refills    pravastatin (PRAVACHOL) 40 mg tablet [Pharmacy Med Name: PRAVASTATIN 40MG TABLETS] 30 Tab 0     Sig: TAKE 1 TABLET BY MOUTH EVERY NIGHT

## 2019-10-30 ENCOUNTER — HOSPITAL ENCOUNTER (OUTPATIENT)
Dept: LAB | Age: 64
Discharge: HOME | End: 2019-10-30
Payer: COMMERCIAL

## 2019-10-30 ENCOUNTER — OFFICE VISIT (OUTPATIENT)
Dept: FAMILY MEDICINE CLINIC | Facility: CLINIC | Age: 64
End: 2019-10-30

## 2019-10-30 VITALS
RESPIRATION RATE: 12 BRPM | BODY MASS INDEX: 32.15 KG/M2 | DIASTOLIC BLOOD PRESSURE: 60 MMHG | WEIGHT: 193 LBS | SYSTOLIC BLOOD PRESSURE: 130 MMHG | HEART RATE: 78 BPM | HEIGHT: 65 IN | OXYGEN SATURATION: 95 % | TEMPERATURE: 97 F

## 2019-10-30 DIAGNOSIS — Z11.59 ENCOUNTER FOR HEPATITIS C SCREENING TEST FOR LOW RISK PATIENT: ICD-10-CM

## 2019-10-30 DIAGNOSIS — F17.200 TOBACCO USE DISORDER: ICD-10-CM

## 2019-10-30 DIAGNOSIS — E78.5 DYSLIPIDEMIA: ICD-10-CM

## 2019-10-30 DIAGNOSIS — Z78.0 MENOPAUSE: ICD-10-CM

## 2019-10-30 DIAGNOSIS — E03.9 HYPOTHYROIDISM, UNSPECIFIED TYPE: Primary | ICD-10-CM

## 2019-10-30 DIAGNOSIS — Z23 ENCOUNTER FOR IMMUNIZATION: ICD-10-CM

## 2019-10-30 DIAGNOSIS — E55.9 VITAMIN D DEFICIENCY: ICD-10-CM

## 2019-10-30 DIAGNOSIS — E03.9 HYPOTHYROIDISM, UNSPECIFIED TYPE: ICD-10-CM

## 2019-10-30 LAB
ALBUMIN SERPL-MCNC: 4.1 G/DL (ref 3.4–5)
ALBUMIN/GLOB SERPL: 1.4 {RATIO} (ref 0.8–1.7)
ALP SERPL-CCNC: 67 U/L (ref 45–117)
ALT SERPL-CCNC: 15 U/L (ref 13–56)
ANION GAP SERPL CALC-SCNC: 8 MMOL/L (ref 3–18)
AST SERPL-CCNC: 7 U/L (ref 10–38)
BASOPHILS # BLD: 0.1 K/UL (ref 0–0.1)
BASOPHILS NFR BLD: 1 % (ref 0–2)
BILIRUB SERPL-MCNC: 0.4 MG/DL (ref 0.2–1)
BUN SERPL-MCNC: 9 MG/DL (ref 7–18)
BUN/CREAT SERPL: 13 (ref 12–20)
CALCIUM SERPL-MCNC: 9.5 MG/DL (ref 8.5–10.1)
CHLORIDE SERPL-SCNC: 107 MMOL/L (ref 100–111)
CO2 SERPL-SCNC: 25 MMOL/L (ref 21–32)
CREAT SERPL-MCNC: 0.7 MG/DL (ref 0.6–1.3)
DIFFERENTIAL METHOD BLD: ABNORMAL
EOSINOPHIL # BLD: 0.1 K/UL (ref 0–0.4)
EOSINOPHIL NFR BLD: 2 % (ref 0–5)
ERYTHROCYTE [DISTWIDTH] IN BLOOD BY AUTOMATED COUNT: 12.5 % (ref 11.6–14.5)
GLOBULIN SER CALC-MCNC: 2.9 G/DL (ref 2–4)
GLUCOSE SERPL-MCNC: 94 MG/DL (ref 74–99)
HCT VFR BLD AUTO: 43.4 % (ref 35–45)
HGB BLD-MCNC: 14.2 G/DL (ref 12–16)
LYMPHOCYTES # BLD: 2.1 K/UL (ref 0.9–3.6)
LYMPHOCYTES NFR BLD: 27 % (ref 21–52)
MCH RBC QN AUTO: 31.6 PG (ref 24–34)
MCHC RBC AUTO-ENTMCNC: 32.7 G/DL (ref 31–37)
MCV RBC AUTO: 96.4 FL (ref 74–97)
MONOCYTES # BLD: 0.4 K/UL (ref 0.05–1.2)
MONOCYTES NFR BLD: 5 % (ref 3–10)
NEUTS SEG # BLD: 5.2 K/UL (ref 1.8–8)
NEUTS SEG NFR BLD: 65 % (ref 40–73)
PLATELET # BLD AUTO: 287 K/UL (ref 135–420)
PMV BLD AUTO: 9 FL (ref 9.2–11.8)
POTASSIUM SERPL-SCNC: 3.9 MMOL/L (ref 3.5–5.5)
PROT SERPL-MCNC: 7 G/DL (ref 6.4–8.2)
RBC # BLD AUTO: 4.5 M/UL (ref 4.2–5.3)
SODIUM SERPL-SCNC: 140 MMOL/L (ref 136–145)
TSH SERPL DL<=0.05 MIU/L-ACNC: 1.65 UIU/ML (ref 0.36–3.74)
WBC # BLD AUTO: 7.9 K/UL (ref 4.6–13.2)

## 2019-10-30 PROCEDURE — 84443 ASSAY THYROID STIM HORMONE: CPT

## 2019-10-30 PROCEDURE — 85025 COMPLETE CBC W/AUTO DIFF WBC: CPT

## 2019-10-30 PROCEDURE — 80053 COMPREHEN METABOLIC PANEL: CPT

## 2019-10-30 PROCEDURE — 86803 HEPATITIS C AB TEST: CPT

## 2019-10-30 PROCEDURE — 82306 VITAMIN D 25 HYDROXY: CPT

## 2019-10-30 PROCEDURE — 80061 LIPID PANEL: CPT

## 2019-10-30 PROCEDURE — 36415 COLL VENOUS BLD VENIPUNCTURE: CPT

## 2019-10-30 RX ORDER — IBUPROFEN 200 MG
1 TABLET ORAL EVERY 24 HOURS
Qty: 30 PATCH | Refills: 0 | Status: SHIPPED | OUTPATIENT
Start: 2019-10-30 | End: 2019-11-29

## 2019-10-30 NOTE — PROGRESS NOTES
Susana Garsia is a 62 y.o. female presenting today for Physical    HPI:  Brianna Marie presents to the office today for plea physical examination. Patient has a past medical history for dyslipidemia, tobacco abuse, vitamin D deficiency, hypothyroidism, GERD and asthma. She presents without any complaints of pain. She is negative for chest pain, palpitation, lower extremity edema. She did not get her fasting labs done prior to today's visit she is fasting. She is a everyday smoker and smokes approximately 8 to 10 cigarettes/day. She previously tried Chantix but had side effects and is requesting to be given NicoDerm patches. She has a history of hypothyroidism and her last TSH was therapeutic    Review of Systems   Constitutional: Negative for malaise/fatigue. Respiratory: Negative for cough and sputum production. Cardiovascular: Negative for chest pain, palpitations and leg swelling. Gastrointestinal: Negative for abdominal pain, nausea and vomiting. Musculoskeletal: Negative for myalgias. Neurological: Negative for dizziness and headaches. Allergies   Allergen Reactions    Atorvastatin Other (comments)     Leg cramps and swelling    Sulfa (Sulfonamide Antibiotics) Hives       Current Outpatient Medications   Medication Sig Dispense Refill    nicotine (NICODERM CQ) 14 mg/24 hr patch 1 Patch by TransDERmal route every twenty-four (24) hours for 30 days. 30 Patch 0    pravastatin (PRAVACHOL) 40 mg tablet TAKE 1 TABLET BY MOUTH EVERY NIGHT 30 Tab 0    montelukast (SINGULAIR) 10 mg tablet TAKE 1 TABLET BY MOUTH DAILY 90 Tab 0    levothyroxine (SYNTHROID) 100 mcg tablet TAKE 1 TABLET BY MOUTH EVERY DAY 90 Tab 0    fluticasone (FLONASE) 50 mcg/actuation nasal spray SHAKE LIQUID AND USE 2 SPRAYS IN EACH NOSTRIL DAILY 1 Bottle 3    albuterol (PROVENTIL HFA, VENTOLIN HFA, PROAIR HFA) 90 mcg/actuation inhaler Take 2 Puffs by inhalation every four (4) hours as needed for Wheezing.  1 Inhaler 2    ergocalciferol (ERGOCALCIFEROL) 50,000 unit capsule TAKE 1 CAPSULE BY MOUTH EVERY 7 DAYS 12 Cap 0    ibuprofen (MOTRIN) 600 mg tablet Take 1 Tab by mouth every six (6) hours as needed for Pain. 20 Tab 0    cyclobenzaprine (FLEXERIL) 10 mg tablet Take 1 Tab by mouth three (3) times daily as needed for Muscle Spasm(s). 15 Tab 0    pravastatin (PRAVACHOL) 40 mg tablet TAKE 1 TABLET BY MOUTH EVERY NIGHT 90 Tab 1    naltrexone-buPROPion (CONTRAVE) 8-90 mg TbER ER tablet Week 1 1 tab PO QAM, Week 2 1QAM 1QHS, Week 3 2QAM 1 QHS, Week 4 & beyond 2QAM 2QHS 162 Tab 0    valACYclovir (VALTREX) 1 gram tablet Take 1 Tab by mouth two (2) times daily as needed. 20 Tab 0    cholecalciferol, VITAMIN D3, (VITAMIN D3) 5,000 unit tab tablet Take  by mouth daily. Past Medical History:   Diagnosis Date    Asthma     childhood diagnosis; occasional inhaler use    Chest pain     Diverticulitis     Dyslipidemia     \"borderline\" per pt.  Endometriosis     GERD (gastroesophageal reflux disease)     Hypothyroidism     Normal nuclear stress test 09/14/2011    No ischemia or infarct. EF 75%. No WMA. Neg max EST w/nondiagnostic EKG changes and atypical chest discomfort at peak exercise.     Restless leg syndrome     Thyroid disease     Tobacco use disorder     ongoing       Past Surgical History:   Procedure Laterality Date    FOOT/TOES SURGERY PROC UNLISTED      HX HYSTERECTOMY      HX HYSTEROSCOPY      HX OVARIAN CYST REMOVAL      teenager    HX TUBAL LIGATION      30 years ago       Social History     Socioeconomic History    Marital status:      Spouse name: Not on file    Number of children: Not on file    Years of education: Not on file    Highest education level: Not on file   Occupational History    Not on file   Social Needs    Financial resource strain: Not on file    Food insecurity:     Worry: Not on file     Inability: Not on file    Transportation needs:     Medical: Not on file Non-medical: Not on file   Tobacco Use    Smoking status: Current Every Day Smoker     Packs/day: 0.25     Years: 25.00     Pack years: 6.25    Smokeless tobacco: Never Used   Substance and Sexual Activity    Alcohol use: Yes     Comment: social    Drug use: No    Sexual activity: Yes     Partners: Male     Birth control/protection: None   Lifestyle    Physical activity:     Days per week: Not on file     Minutes per session: Not on file    Stress: Not on file   Relationships    Social connections:     Talks on phone: Not on file     Gets together: Not on file     Attends Anabaptist service: Not on file     Active member of club or organization: Not on file     Attends meetings of clubs or organizations: Not on file     Relationship status: Not on file    Intimate partner violence:     Fear of current or ex partner: Not on file     Emotionally abused: Not on file     Physically abused: Not on file     Forced sexual activity: Not on file   Other Topics Concern    Not on file   Social History Narrative    Not on file       Patient does not have an advanced directive on file    Vitals:    10/30/19 0921   BP: 130/60   Pulse: 78   Resp: 12   Temp: 97 °F (36.1 °C)   TempSrc: Oral   SpO2: 95%   Weight: 193 lb (87.5 kg)   Height: 5' 5\" (1.651 m)   PainSc:   0 - No pain       Physical Exam   Constitutional: She is oriented to person, place, and time. No distress. Cardiovascular: Normal rate, regular rhythm and normal heart sounds. Pulmonary/Chest: Effort normal and breath sounds normal.   Abdominal: Soft. Lymphadenopathy:     She has no cervical adenopathy. Neurological: She is alert and oriented to person, place, and time. Skin: Skin is warm and dry. Psychiatric: Affect normal.   Nursing note and vitals reviewed. No visits with results within 3 Month(s) from this visit.    Latest known visit with results is:   Admission on 06/24/2019, Discharged on 06/24/2019   Component Date Value Ref Range Status  WBC 06/24/2019 7.2  4.6 - 13.2 K/uL Final    RBC 06/24/2019 4.65  4.20 - 5.30 M/uL Final    HGB 06/24/2019 14.9  12.0 - 16.0 g/dL Final    HCT 06/24/2019 42.8  35.0 - 45.0 % Final    MCV 06/24/2019 92.0  74.0 - 97.0 FL Final    MCH 06/24/2019 32.0  24.0 - 34.0 PG Final    MCHC 06/24/2019 34.8  31.0 - 37.0 g/dL Final    RDW 06/24/2019 12.3  11.6 - 14.5 % Final    PLATELET 04/27/6925 465  135 - 420 K/uL Final    MPV 06/24/2019 8.8* 9.2 - 11.8 FL Final    NEUTROPHILS 06/24/2019 64  40 - 73 % Final    LYMPHOCYTES 06/24/2019 29  21 - 52 % Final    MONOCYTES 06/24/2019 5  3 - 10 % Final    EOSINOPHILS 06/24/2019 1  0 - 5 % Final    BASOPHILS 06/24/2019 1  0 - 2 % Final    ABS. NEUTROPHILS 06/24/2019 4.6  1.8 - 8.0 K/UL Final    ABS. LYMPHOCYTES 06/24/2019 2.1  0.9 - 3.6 K/UL Final    ABS. MONOCYTES 06/24/2019 0.4  0.05 - 1.2 K/UL Final    ABS. EOSINOPHILS 06/24/2019 0.1  0.0 - 0.4 K/UL Final    ABS. BASOPHILS 06/24/2019 0.1  0.0 - 0.1 K/UL Final    DF 06/24/2019 AUTOMATED    Final    Sodium 06/24/2019 138  136 - 145 mmol/L Final    Potassium 06/24/2019 4.3  3.5 - 5.5 mmol/L Final    Chloride 06/24/2019 107  100 - 108 mmol/L Final    CO2 06/24/2019 27  21 - 32 mmol/L Final    Anion gap 06/24/2019 4  3.0 - 18 mmol/L Final    Glucose 06/24/2019 93  74 - 99 mg/dL Final    BUN 06/24/2019 11  7.0 - 18 MG/DL Final    Creatinine 06/24/2019 0.65  0.6 - 1.3 MG/DL Final    BUN/Creatinine ratio 06/24/2019 17  12 - 20   Final    GFR est AA 06/24/2019 >60  >60 ml/min/1.73m2 Final    GFR est non-AA 06/24/2019 >60  >60 ml/min/1.73m2 Final    Comment: (NOTE)  Estimated GFR is calculated using the Modification of Diet in Renal   Disease (MDRD) Study equation, reported for both  Americans   (GFRAA) and non- Americans (GFRNA), and normalized to 1.73m2   body surface area. The physician must decide which value applies to   the patient.  The MDRD study equation should only be used in individuals age 25 or older. It has not been validated for the   following: pregnant women, patients with serious comorbid conditions,   or on certain medications, or persons with extremes of body size,   muscle mass, or nutritional status.  Calcium 06/24/2019 9.2  8.5 - 10.1 MG/DL Final    Bilirubin, total 06/24/2019 0.3  0.2 - 1.0 MG/DL Final    ALT (SGPT) 06/24/2019 17  13 - 56 U/L Final    AST (SGOT) 06/24/2019 7* 15 - 37 U/L Final    Alk. phosphatase 06/24/2019 79  45 - 117 U/L Final    Protein, total 06/24/2019 7.5  6.4 - 8.2 g/dL Final    Albumin 06/24/2019 3.9  3.4 - 5.0 g/dL Final    Globulin 06/24/2019 3.6  2.0 - 4.0 g/dL Final    A-G Ratio 06/24/2019 1.1  0.8 - 1.7   Final    Lipase 06/24/2019 149  73 - 393 U/L Final    Magnesium 06/24/2019 2.2  1.6 - 2.6 mg/dL Final    CK 06/24/2019 41  26 - 192 U/L Final    CK - MB 06/24/2019 <1.0  <3.6 ng/ml Final    CK-MB Index 06/24/2019 CALCULATION NOT PERFORMED WHEN RESULT IS BELOW LINEAR LIMIT  0.0 - 4.0 % Final    Troponin-I, QT 06/24/2019 <0.02  0.0 - 0.045 NG/ML Final    Comment: The presence of detectable troponin above the reference range indicates myocardial injury which may be due to ischemia, myocarditis, trauma, etc.  Clinical correlation is necessary to establish the significance of this finding. Sequential testing is recommended to determine if the typical rise and fall of cTnI is demonstrated. Note:  Cardiac troponin I has a relatively long half life and may be present well after the CK MB has returned to baseline. The reference range is based on the 99th percentile of the referent population.       Ventricular Rate 06/24/2019 66  BPM Final    Atrial Rate 06/24/2019 66  BPM Final    P-R Interval 06/24/2019 142  ms Final    QRS Duration 06/24/2019 92  ms Final    Q-T Interval 06/24/2019 414  ms Final    QTC Calculation (Bezet) 06/24/2019 434  ms Final    Calculated P Axis 06/24/2019 70  degrees Final    Calculated R Axis 06/24/2019 10  degrees Final    Calculated T Axis 06/24/2019 59  degrees Final    Diagnosis 06/24/2019    Final                    Value:Normal sinus rhythm  Normal ECG  When compared with ECG of 01-JUN-2017 15:05,  No significant change was found  Confirmed by Brenna Friend MD, ----- ([id number]) on 6/24/2019 1:00:35 PM         .No results found for any visits on 10/30/19. Assessment / Plan:      ICD-10-CM ICD-9-CM    1. Hypothyroidism, unspecified type E03.9 244.9 TSH 3RD GENERATION   2. Tobacco use disorder F17.200 305.1 CT LOW DOSE LUNG CANCER SCREENING      nicotine (NICODERM CQ) 14 mg/24 hr patch   3. Dyslipidemia E78.5 272.4 CBC WITH AUTOMATED DIFF      LIPID PANEL      METABOLIC PANEL, COMPREHENSIVE   4. Vitamin D deficiency E55.9 268.9 VITAMIN D, 25 HYDROXY   5. Menopause Z78.0 627.2 DEXA BONE DENSITY STUDY AXIAL   6. Encounter for hepatitis C screening test for low risk patient Z11.59 V73.89 HEPATITIS C AB     Hypothyroidism-TSH today  Every day smoker-low-dose CT scan ordered  Fasting labs ordered today  Screening from hepatitis C  Bone density test ordered  Vitamin D-patient has a history of vitamin D deficiency  Follow-up in 6 months      Follow-up and Dispositions    · Return in about 6 months (around 4/30/2020). I asked the patient if she  had any questions and answered her  questions. The patient stated that she understands the treatment plan and agrees with the treatment plan    This document was created with a voice activated dictation system and may contain transcription errors.

## 2019-10-30 NOTE — PROGRESS NOTES
Visit Vitals  /60   Pulse 78   Temp 97 °F (36.1 °C) (Oral)   Resp 12   Ht 5' 5\" (1.651 m)   Wt 193 lb (87.5 kg)   SpO2 95%   BMI 32.12 kg/m²     Brianna Marie presents today for   Chief Complaint   Patient presents with    Physical       Is someone accompanying this pt? no    Is the patient using any DME equipment during OV? no    Depression Screening:  3 most recent PHQ Screens 9/25/2019   Little interest or pleasure in doing things Not at all   Feeling down, depressed, irritable, or hopeless Not at all   Total Score PHQ 2 0       Learning Assessment:  Learning Assessment 5/18/2018   PRIMARY LEARNER Patient   BARRIERS PRIMARY LEARNER NONE   PRIMARY LANGUAGE ENGLISH   LEARNER PREFERENCE PRIMARY READING     LISTENING     -   ANSWERED BY self   RELATIONSHIP SELF       Abuse Screening:  No flowsheet data found. Fall Risk  No flowsheet data found. Health Maintenance reviewed and discussed and ordered per Provider. Health Maintenance Due   Topic Date Due    Hepatitis C Screening  1955    Pneumococcal 0-64 years (1 of 1 - PPSV23) 02/05/1962    PAP AKA CERVICAL CYTOLOGY  02/05/1977    Shingrix Vaccine Age 50> (1 of 2) 02/05/2006    Influenza Age 5 to Adult  08/01/2019           Coordination of Care:  1. Have you been to the ER, urgent care clinic since your last visit? Hospitalized since your last visit? no    2. Have you seen or consulted any other health care providers outside of the [address] [place] [address] since your last visit? Include any pap smears or colon screening.  no

## 2019-10-31 LAB
25(OH)D3 SERPL-MCNC: 28.3 NG/ML (ref 30–100)
CHOLEST SERPL-MCNC: 248 MG/DL
HCV AB SER IA-ACNC: 0.05 INDEX
HCV AB SERPL QL IA: NEGATIVE
HCV COMMENT,HCGAC: NORMAL
HDLC SERPL-MCNC: 49 MG/DL (ref 40–60)
HDLC SERPL: 5.1 {RATIO} (ref 0–5)
LDLC SERPL CALC-MCNC: 170.8 MG/DL (ref 0–100)
LIPID PROFILE,FLP: ABNORMAL
TRIGL SERPL-MCNC: 141 MG/DL (ref ?–150)
VLDLC SERPL CALC-MCNC: 28.2 MG/DL

## 2019-11-14 ENCOUNTER — NURSE NAVIGATOR (OUTPATIENT)
Dept: OTHER | Age: 64
End: 2019-11-14

## 2019-11-14 NOTE — NURSE NAVIGATOR
Referring Provider: Anny Corrales NP      Lung Cancer Risk Profile:   Age: 62  Gender: Female  Height: 65\"  Weight: 193#    Smoking History:  Smoking Status: current use  # years smoking:   # years quit:   Packs/day:   Pack years: 35    Patient discussed smoking cessation with PCP: Yes, per patient report    Patient participated in shared decision making process with PCP: Unknown    Patient is currently experiencing symptoms: No    If yes what symptoms:     Co-Morbidities:      Cancer History:      Additional Risk Factors:   Exposure to asbestos, uncle with lung cancer      Patient's smoking history discussed via phone. Patient meets LDCT lung cancer screening criteria.  Call transferred to central scheduling to schedule exam.      [place]udeloN, RN, [address] [address] [facility] Nurse Navigator

## 2019-11-22 NOTE — PROGRESS NOTES
Please notify patient that lab results were reviewed and the results are okay except for her cholesterol panel. I would like to start the patient on cholesterol medication if she is agreeable. Please advise? Her vitamin D level has improved.   Is still insufficient but very mild insufficiency no

## 2019-12-03 DIAGNOSIS — E78.5 DYSLIPIDEMIA: ICD-10-CM

## 2019-12-03 RX ORDER — PRAVASTATIN SODIUM 40 MG/1
TABLET ORAL
Qty: 30 TAB | Refills: 0 | Status: SHIPPED | OUTPATIENT
Start: 2019-12-03

## 2019-12-23 ENCOUNTER — HOSPITAL ENCOUNTER (OUTPATIENT)
Dept: CT IMAGING | Age: 64
Discharge: HOME | End: 2019-12-23
Attending: NURSE PRACTITIONER
Payer: COMMERCIAL

## 2019-12-23 VITALS — BODY MASS INDEX: 29.57 KG/M2 | WEIGHT: 184 LBS | HEIGHT: 66 IN

## 2019-12-23 DIAGNOSIS — F17.200 TOBACCO USE DISORDER: ICD-10-CM

## 2019-12-23 PROCEDURE — G0297 LDCT FOR LUNG CA SCREEN: HCPCS

## 2020-01-09 DIAGNOSIS — J43.9 PULMONARY EMPHYSEMA, UNSPECIFIED EMPHYSEMA TYPE (HCC): ICD-10-CM

## 2020-01-09 DIAGNOSIS — T17.500A MUCOID IMPACTION OF BRONCHI: Primary | ICD-10-CM

## 2020-01-21 DIAGNOSIS — E03.9 HYPOTHYROIDISM, UNSPECIFIED TYPE: ICD-10-CM

## 2020-01-21 NOTE — TELEPHONE ENCOUNTER
Requested Prescriptions     Pending Prescriptions Disp Refills    levothyroxine (SYNTHROID) 100 mcg tablet 90 Tab 0

## 2020-01-22 RX ORDER — LEVOTHYROXINE SODIUM 100 UG/1
TABLET ORAL
Qty: 90 TAB | Refills: 0 | Status: SHIPPED | OUTPATIENT
Start: 2020-01-22 | End: 2020-04-27

## 2020-01-27 DIAGNOSIS — E55.9 VITAMIN D DEFICIENCY: ICD-10-CM

## 2020-01-27 NOTE — TELEPHONE ENCOUNTER
Requested Prescriptions     Pending Prescriptions Disp Refills    ergocalciferol (ERGOCALCIFEROL) 1,250 mcg (50,000 unit) capsule 12 Cap 0

## 2020-01-31 RX ORDER — ERGOCALCIFEROL 1.25 MG/1
CAPSULE ORAL
Qty: 12 CAP | Refills: 0 | Status: SHIPPED | OUTPATIENT
Start: 2020-01-31 | End: 2020-08-04

## 2020-02-07 RX ORDER — MONTELUKAST SODIUM 10 MG/1
TABLET ORAL
Qty: 90 TAB | Refills: 0 | Status: SHIPPED | OUTPATIENT
Start: 2020-02-07 | End: 2021-02-15

## 2020-02-10 ENCOUNTER — HOSPITAL ENCOUNTER (OUTPATIENT)
Dept: MAMMOGRAPHY | Age: 65
Discharge: HOME | End: 2020-02-10
Attending: NURSE PRACTITIONER
Payer: COMMERCIAL

## 2020-02-10 ENCOUNTER — HOSPITAL ENCOUNTER (OUTPATIENT)
Dept: BONE DENSITY | Age: 65
Discharge: HOME | End: 2020-02-10
Attending: NURSE PRACTITIONER
Payer: COMMERCIAL

## 2020-02-10 DIAGNOSIS — Z78.0 MENOPAUSE: ICD-10-CM

## 2020-02-10 DIAGNOSIS — Z12.31 VISIT FOR SCREENING MAMMOGRAM: ICD-10-CM

## 2020-02-10 PROCEDURE — 77063 BREAST TOMOSYNTHESIS BI: CPT

## 2020-02-10 PROCEDURE — 77080 DXA BONE DENSITY AXIAL: CPT

## 2020-02-11 NOTE — PROGRESS NOTES
Please let patient know the xray results showed isteopenia. Please make sure she is taking Calcium and Vitamin D OTC. Thanks!

## 2020-04-02 DIAGNOSIS — Z87.09 HISTORY OF ASTHMA: ICD-10-CM

## 2020-04-02 NOTE — TELEPHONE ENCOUNTER
Requested Prescriptions     Pending Prescriptions Disp Refills    albuterol (PROVENTIL HFA, VENTOLIN HFA, PROAIR HFA) 90 mcg/actuation inhaler 1 Inhaler 2     Sig: Take 2 Puffs by inhalation every four (4) hours as needed for Wheezing.

## 2020-04-04 RX ORDER — ALBUTEROL SULFATE 90 UG/1
2 AEROSOL, METERED RESPIRATORY (INHALATION)
Qty: 1 INHALER | Refills: 2 | Status: SHIPPED | OUTPATIENT
Start: 2020-04-04 | End: 2020-04-05

## 2020-04-05 DIAGNOSIS — Z87.09 HISTORY OF ASTHMA: ICD-10-CM

## 2020-04-05 RX ORDER — ALBUTEROL SULFATE 90 UG/1
AEROSOL, METERED RESPIRATORY (INHALATION)
Qty: 90 G | Refills: 2 | Status: SHIPPED | OUTPATIENT
Start: 2020-04-05 | End: 2021-08-02

## 2020-04-06 ENCOUNTER — TELEPHONE (OUTPATIENT)
Dept: FAMILY MEDICINE CLINIC | Facility: CLINIC | Age: 65
End: 2020-04-06

## 2020-04-06 DIAGNOSIS — Z87.09 HISTORY OF ASTHMA: ICD-10-CM

## 2020-04-06 NOTE — TELEPHONE ENCOUNTER
Patient has request to talk with you twice thru Mychat. I sent her a message to ask what it may be about and that we could give her a virtual visit so she can talk to you, but she requested again for a phone call. Never would tell me why she wants you to call her.

## 2020-04-06 NOTE — LETTER
4/10/2020 12:53 PM 
 
Ms. Brianna Marie 
[address] [facility] [id number] To Whom It May Concern: 
 
Brianna Marie is currently under the care of Noam[facility] Gardenia Rodriguez. According to the CDC high-risk guidelines, the patient above has a chronic illness and if exposed to COVID-19 may experience severe consequences. If there are questions or concerns please have the patient contact our office. Sincerely, 
 
 
Chad Khan NP 
(Dr. Devin Vilchis, SHOS[facility]NA) electronic signature

## 2020-04-06 NOTE — TELEPHONE ENCOUNTER
Requested Prescriptions     Pending Prescriptions Disp Refills    albuterol (PROVENTIL HFA, VENTOLIN HFA, PROAIR HFA) 90 mcg/actuation inhaler 90 g 2     Sig: INHALE 2 PUFFS BY MOUTH EVERY 4 HOURS AS NEEDED FOR WHEEZING

## 2020-04-08 RX ORDER — ALBUTEROL SULFATE 90 UG/1
AEROSOL, METERED RESPIRATORY (INHALATION)
Qty: 90 G | Refills: 2 | OUTPATIENT
Start: 2020-04-08

## 2020-04-23 DIAGNOSIS — E03.9 HYPOTHYROIDISM, UNSPECIFIED TYPE: ICD-10-CM

## 2020-04-27 RX ORDER — LEVOTHYROXINE SODIUM 100 UG/1
TABLET ORAL
Qty: 90 TAB | Refills: 0 | Status: SHIPPED | OUTPATIENT
Start: 2020-04-27 | End: 2020-07-24

## 2020-05-05 DIAGNOSIS — E78.5 DYSLIPIDEMIA: ICD-10-CM

## 2020-05-05 NOTE — TELEPHONE ENCOUNTER
Requested Prescriptions     Pending Prescriptions Disp Refills    pravastatin (PRAVACHOL) 40 mg tablet 90 Tab 1

## 2020-05-07 RX ORDER — PRAVASTATIN SODIUM 40 MG/1
TABLET ORAL
Qty: 90 TAB | Refills: 0 | Status: SHIPPED | OUTPATIENT
Start: 2020-05-07 | End: 2020-08-05

## 2020-07-22 DIAGNOSIS — E03.9 HYPOTHYROIDISM, UNSPECIFIED TYPE: ICD-10-CM

## 2020-07-24 RX ORDER — LEVOTHYROXINE SODIUM 100 UG/1
TABLET ORAL
Qty: 90 TAB | Refills: 0 | Status: SHIPPED | OUTPATIENT
Start: 2020-07-24 | End: 2020-10-26

## 2020-08-03 DIAGNOSIS — E55.9 VITAMIN D DEFICIENCY: ICD-10-CM

## 2020-08-04 RX ORDER — ERGOCALCIFEROL 1.25 MG/1
CAPSULE ORAL
Qty: 12 CAP | Refills: 0 | Status: SHIPPED | OUTPATIENT
Start: 2020-08-04 | End: 2021-02-17

## 2020-08-05 DIAGNOSIS — E78.5 DYSLIPIDEMIA: ICD-10-CM

## 2020-08-05 RX ORDER — PRAVASTATIN SODIUM 40 MG/1
TABLET ORAL
Qty: 90 TAB | Refills: 0 | Status: SHIPPED | OUTPATIENT
Start: 2020-08-05 | End: 2020-12-13

## 2020-10-26 DIAGNOSIS — E78.5 DYSLIPIDEMIA: ICD-10-CM

## 2020-10-26 DIAGNOSIS — E03.9 HYPOTHYROIDISM, UNSPECIFIED TYPE: Primary | ICD-10-CM

## 2020-11-24 ENCOUNTER — VIRTUAL VISIT (OUTPATIENT)
Dept: FAMILY MEDICINE CLINIC | Age: 65
End: 2020-11-24
Payer: COMMERCIAL

## 2020-11-24 DIAGNOSIS — B00.9 HSV-1 INFECTION: ICD-10-CM

## 2020-11-24 DIAGNOSIS — E03.9 HYPOTHYROIDISM, UNSPECIFIED TYPE: ICD-10-CM

## 2020-11-24 DIAGNOSIS — F17.200 TOBACCO USE DISORDER: Primary | ICD-10-CM

## 2020-11-24 DIAGNOSIS — E78.5 DYSLIPIDEMIA: ICD-10-CM

## 2020-11-24 PROCEDURE — 99441 PR PHYS/QHP TELEPHONE EVALUATION 5-10 MIN: CPT | Performed by: NURSE PRACTITIONER

## 2020-11-24 NOTE — PROGRESS NOTES
Brianna Marie presents today for   Chief Complaint   Patient presents with    Follow-up       Virtual/telephone visit    Depression Screening:  3 most recent PHQ Screens 11/24/2020   Little interest or pleasure in doing things Not at all   Feeling down, depressed, irritable, or hopeless Not at all   Total Score PHQ 2 0       Learning Assessment:  Learning Assessment 5/18/2018   PRIMARY LEARNER Patient   BARRIERS PRIMARY LEARNER NONE   PRIMARY LANGUAGE ENGLISH   LEARNER PREFERENCE PRIMARY READING     LISTENING     -   ANSWERED BY self   RELATIONSHIP SELF       Abuse Screening:  Abuse Screening Questionnaire 11/24/2020   Do you ever feel afraid of your partner? N   Are you in a relationship with someone who physically or mentally threatens you? N   Is it safe for you to go home? Y       Fall Risk  No flowsheet data found. Health Maintenance reviewed and discussed and ordered per Provider. Health Maintenance Due   Topic Date Due    Pneumococcal 0-64 years (1 of 1 - PPSV23) 02/05/1962    PAP AKA CERVICAL CYTOLOGY  02/05/1977    Shingrix Vaccine Age 50> (1 of 2) 02/05/2006    Flu Vaccine (1) 09/01/2020    Lipid Screen  10/30/2020   . Coordination of Care:  1. Have you been to the ER, urgent care clinic since your last visit? Hospitalized since your last visit? yes    2. Have you seen or consulted any other health care providers outside of the [address] [place] [address] since your last visit? Include any pap smears or colon screening.  no

## 2020-11-24 NOTE — PROGRESS NOTES
Isha Oden is a 60 y.o. female, evaluated via audio-only technology on 11/24/2020 for Follow-up    Assessment & Plan:   Diagnoses and all orders for this visit:    1. Tobacco use disorder    2. HSV-1 infection  -     valACYclovir (VALTREX) 1 gram tablet; Take 1 Tab by mouth two (2) times daily as needed for Other (HSV-1). 3. Hypothyroidism, unspecified type    4. Dyslipidemia  -     VITAMIN D, 25 HYDROXY; Future        12  Subjective: The patient presents for an Audio-visual teleconference appointment for routine follow-up care. She carries a medical history for dyslipidemia, Hypothyroidism and Vitamin D Deficiency. Fasting labs were ordered which patient did not complete. She notes he is feeling quite well and denies any cough, fever, loss of smell/taste or GI upset. She admits she has started smoking again but plans to quit the beginning of the new year. She is negative for CP or palpitations. Hypothyroidism- previous TSH in October, 2019 and was 1.65. She is prescribed Levothyroxine 100 mcg daily. HLD- notes she is compliant with the medication plan and denies any myalgia. Prior to Admission medications    Medication Sig Start Date End Date Taking? Authorizing Provider   valACYclovir (VALTREX) 1 gram tablet Take 1 Tab by mouth two (2) times daily as needed for Other (HSV-1).  11/30/20  Yes Tariq Childs NP   levothyroxine (SYNTHROID) 100 mcg tablet TAKE 1 TABLET BY MOUTH EVERY DAY 10/30/20  Yes Tariq Childs NP   ergocalciferol (ERGOCALCIFEROL) 1,250 mcg (50,000 unit) capsule TAKE 1 CAPSULE BY MOUTH EVERY 7 DAYS 8/4/20  Yes Tariq Childs NP   albuterol (PROVENTIL HFA, VENTOLIN HFA, PROAIR HFA) 90 mcg/actuation inhaler INHALE 2 PUFFS BY MOUTH EVERY 4 HOURS AS NEEDED FOR WHEEZING 4/5/20  Yes Tariq Childs NP   montelukast (SINGULAIR) 10 mg tablet TAKE 1 TABLET BY MOUTH DAILY 2/7/20  Yes Tariq Childs NP   pravastatin (PRAVACHOL) 40 mg tablet TAKE 1 TABLET BY MOUTH EVERY NIGHT 12/3/19  Yes Gunnar Walker MD   fluticasone [facility]) 50 mcg/actuation nasal spray SHAKE LIQUID AND USE 2 SPRAYS IN [facility] NOSTRIL DAILY 12/3/18  Yes Johanna Sherwood NP   cholecalciferol, VITAMIN D3, (VITAMIN D3) 5,000 unit tab tablet Take  by mouth daily. Yes Provider, Historical   pravastatin (PRAVACHOL) 40 mg tablet TAKE 1 TABLET BY MOUTH EVERY NIGHT 8/5/20   Johanna Sherwood NP   ibuprofen (MOTRIN) 600 mg tablet Take 1 Tab by mouth every six (6) hours as needed for Pain. 6/24/19   Sylvia Coyle MD   cyclobenzaprine (FLEXERIL) 10 mg tablet Take 1 Tab by mouth three (3) times daily as needed for Muscle Spasm(s). 6/24/19   Sylvia Coyle MD   naltrexone-buPROPion (CONTRAVE) 8-90 mg TbER ER tablet Week 1 1 tab PO QAM, Week 2 1QAM 1QHS, Week 3 2QAM 1 QHS, Week 4 & beyond 2QAM 2QHS 12/3/18   Johanna Sherwood NP     Patient Active Problem List   Diagnosis Code    Chest pain R07.9    Dyslipidemia E78.5    Tobacco use disorder F17.200    Hypothyroidism E03.9    Asthma J45.909    Thyroid disease E07.9    Restless leg syndrome G25.81    Endometriosis N80.9    Sigmoid diverticulitis K57.32    Sebaceous cyst of breast, right N60.81    Sebaceous cyst L72.3     Patient Active Problem List    Diagnosis Date Noted    Sebaceous cyst of breast, right 05/18/2018    Sebaceous cyst 05/18/2018    Sigmoid diverticulitis 11/03/2014    Thyroid disease     Restless leg syndrome     Endometriosis     Chest pain     Dyslipidemia     Tobacco use disorder     Hypothyroidism     Asthma      Current Outpatient Medications   Medication Sig Dispense Refill    valACYclovir (VALTREX) 1 gram tablet Take 1 Tab by mouth two (2) times daily as needed for Other (HSV-1).  20 Tab 0    levothyroxine (SYNTHROID) 100 mcg tablet TAKE 1 TABLET BY MOUTH EVERY DAY 30 Tab 0    ergocalciferol (ERGOCALCIFEROL) 1,250 mcg (50,000 unit) capsule TAKE 1 CAPSULE BY MOUTH EVERY 7 DAYS 12 Cap 0    albuterol (PROVENTIL HFA, VENTOLIN HFA, PROAIR HFA) 90 mcg/actuation inhaler INHALE 2 PUFFS BY MOUTH EVERY 4 HOURS AS NEEDED FOR WHEEZING 90 g 2    montelukast (SINGULAIR) 10 mg tablet TAKE 1 TABLET BY MOUTH DAILY 90 Tab 0    pravastatin (PRAVACHOL) 40 mg tablet TAKE 1 TABLET BY MOUTH EVERY NIGHT 30 Tab 0    fluticasone (FLONASE) 50 mcg/actuation nasal spray SHAKE LIQUID AND USE 2 SPRAYS IN EACH NOSTRIL DAILY 1 Bottle 3    cholecalciferol, VITAMIN D3, (VITAMIN D3) 5,000 unit tab tablet Take  by mouth daily.  pravastatin (PRAVACHOL) 40 mg tablet TAKE 1 TABLET BY MOUTH EVERY NIGHT 90 Tab 0    ibuprofen (MOTRIN) 600 mg tablet Take 1 Tab by mouth every six (6) hours as needed for Pain. 20 Tab 0    cyclobenzaprine (FLEXERIL) 10 mg tablet Take 1 Tab by mouth three (3) times daily as needed for Muscle Spasm(s). 15 Tab 0    naltrexone-buPROPion (CONTRAVE) 8-90 mg TbER ER tablet Week 1 1 tab PO QAM, Week 2 1QAM 1QHS, Week 3 2QAM 1 QHS, Week 4 & beyond 2QAM 2QHS 162 Tab 0     Allergies   Allergen Reactions    Atorvastatin Other (comments)     Leg cramps and swelling    Sulfa (Sulfonamide Antibiotics) Hives     Past Medical History:   Diagnosis Date    Asthma     childhood diagnosis; occasional inhaler use    Chest pain     Diverticulitis     Dyslipidemia     \"borderline\" per pt.  Endometriosis     GERD (gastroesophageal reflux disease)     Hypothyroidism     Normal nuclear stress test 09/14/2011    No ischemia or infarct. EF 75%. No WMA. Neg max EST w/nondiagnostic EKG changes and atypical chest discomfort at peak exercise.     Restless leg syndrome     Thyroid disease     Tobacco use disorder     ongoing       ROS    Constitutional: No apparent distress noted  General- negative for fever, chills or fatigue  Eyes- negative visual changes  CV- denies chest pain, palpitation  Pul: negative cough or SOB  GI: negative nausea, flank pain, diarrhea, constipation  Urinary:- No dysuria or polyuria  MS- negative myalgia, negative joint pain  Neuro- negative headache, dizziness or weakness  Skin- negative for rashes or lesions. Psych- denies any anxiety or depression    No flowsheet data found. Brianna Marie, who was evaluated through a patient-initiated, synchronous (real-time) audio only encounter, and/or her healthcare decision maker, is aware that it is a billable service, with coverage as determined by her insurance carrier. She provided verbal consent to proceed: Yes. She has not had a related appointment within my department in the past 7 days or scheduled within the next 24 hours.       Total Time: minutes: 5-10 minutes    Robin Alonso NP

## 2020-11-30 RX ORDER — VALACYCLOVIR HYDROCHLORIDE 1 G/1
1000 TABLET, FILM COATED ORAL
Qty: 20 TAB | Refills: 0 | Status: SHIPPED | OUTPATIENT
Start: 2020-11-30 | End: 2021-09-20

## 2020-12-01 ENCOUNTER — HOSPITAL ENCOUNTER (OUTPATIENT)
Dept: LAB | Age: 65
Discharge: HOME | End: 2020-12-01
Payer: COMMERCIAL

## 2020-12-01 DIAGNOSIS — E78.5 DYSLIPIDEMIA: ICD-10-CM

## 2020-12-01 DIAGNOSIS — E03.9 HYPOTHYROIDISM, UNSPECIFIED TYPE: ICD-10-CM

## 2020-12-01 LAB
25(OH)D3 SERPL-MCNC: 48.5 NG/ML (ref 30–100)
ALBUMIN SERPL-MCNC: 3.8 G/DL (ref 3.4–5)
ALBUMIN/GLOB SERPL: 1.3 {RATIO} (ref 0.8–1.7)
ALP SERPL-CCNC: 60 U/L (ref 45–117)
ALT SERPL-CCNC: 13 U/L (ref 13–56)
ANION GAP SERPL CALC-SCNC: 4 MMOL/L (ref 3–18)
AST SERPL-CCNC: 5 U/L (ref 10–38)
BASOPHILS # BLD: 0 K/UL (ref 0–0.1)
BASOPHILS NFR BLD: 1 % (ref 0–2)
BILIRUB SERPL-MCNC: 0.5 MG/DL (ref 0.2–1)
BUN SERPL-MCNC: 10 MG/DL (ref 7–18)
BUN/CREAT SERPL: 14 (ref 12–20)
CALCIUM SERPL-MCNC: 9.3 MG/DL (ref 8.5–10.1)
CHLORIDE SERPL-SCNC: 109 MMOL/L (ref 100–111)
CHOLEST SERPL-MCNC: 191 MG/DL
CO2 SERPL-SCNC: 28 MMOL/L (ref 21–32)
CREAT SERPL-MCNC: 0.7 MG/DL (ref 0.6–1.3)
DIFFERENTIAL METHOD BLD: NORMAL
EOSINOPHIL # BLD: 0.2 K/UL (ref 0–0.4)
EOSINOPHIL NFR BLD: 2 % (ref 0–5)
ERYTHROCYTE [DISTWIDTH] IN BLOOD BY AUTOMATED COUNT: 12.8 % (ref 11.6–14.5)
GLOBULIN SER CALC-MCNC: 3 G/DL (ref 2–4)
GLUCOSE SERPL-MCNC: 83 MG/DL (ref 74–99)
HCT VFR BLD AUTO: 40.4 % (ref 35–45)
HDLC SERPL-MCNC: 58 MG/DL (ref 40–60)
HDLC SERPL: 3.3 {RATIO} (ref 0–5)
HGB BLD-MCNC: 13.3 G/DL (ref 12–16)
LDLC SERPL CALC-MCNC: 111.4 MG/DL (ref 0–100)
LIPID PROFILE,FLP: ABNORMAL
LYMPHOCYTES # BLD: 2.6 K/UL (ref 0.9–3.6)
LYMPHOCYTES NFR BLD: 31 % (ref 21–52)
MCH RBC QN AUTO: 31.1 PG (ref 24–34)
MCHC RBC AUTO-ENTMCNC: 32.9 G/DL (ref 31–37)
MCV RBC AUTO: 94.6 FL (ref 74–97)
MONOCYTES # BLD: 0.6 K/UL (ref 0.05–1.2)
MONOCYTES NFR BLD: 7 % (ref 3–10)
NEUTS SEG # BLD: 5.1 K/UL (ref 1.8–8)
NEUTS SEG NFR BLD: 59 % (ref 40–73)
PLATELET # BLD AUTO: 281 K/UL (ref 135–420)
PMV BLD AUTO: 9.2 FL (ref 9.2–11.8)
POTASSIUM SERPL-SCNC: 4.2 MMOL/L (ref 3.5–5.5)
PROT SERPL-MCNC: 6.8 G/DL (ref 6.4–8.2)
RBC # BLD AUTO: 4.27 M/UL (ref 4.2–5.3)
SODIUM SERPL-SCNC: 141 MMOL/L (ref 136–145)
T4 FREE SERPL-MCNC: 1.3 NG/DL (ref 0.7–1.5)
TRIGL SERPL-MCNC: 108 MG/DL (ref ?–150)
TSH SERPL DL<=0.05 MIU/L-ACNC: 5.18 UIU/ML (ref 0.36–3.74)
VLDLC SERPL CALC-MCNC: 21.6 MG/DL
WBC # BLD AUTO: 8.5 K/UL (ref 4.6–13.2)

## 2020-12-01 PROCEDURE — 84439 ASSAY OF FREE THYROXINE: CPT

## 2020-12-01 PROCEDURE — 82306 VITAMIN D 25 HYDROXY: CPT

## 2020-12-01 PROCEDURE — 80061 LIPID PANEL: CPT

## 2020-12-01 PROCEDURE — 80053 COMPREHEN METABOLIC PANEL: CPT

## 2020-12-01 PROCEDURE — 84443 ASSAY THYROID STIM HORMONE: CPT

## 2020-12-01 PROCEDURE — 36415 COLL VENOUS BLD VENIPUNCTURE: CPT

## 2020-12-01 PROCEDURE — 85025 COMPLETE CBC W/AUTO DIFF WBC: CPT

## 2020-12-11 DIAGNOSIS — E03.9 HYPOTHYROIDISM, UNSPECIFIED TYPE: Primary | ICD-10-CM

## 2020-12-11 RX ORDER — LEVOTHYROXINE SODIUM 112 UG/1
112 TABLET ORAL
Qty: 90 TAB | Refills: 0 | Status: SHIPPED | OUTPATIENT
Start: 2020-12-11 | End: 2021-03-10

## 2020-12-11 NOTE — PROGRESS NOTES
Please notify patient that lab results were reviewed and her TSH level is elevated. Called a prescription for Levothyroxine 112 mcg daily. Please repeat the test in 3 months. Her cholesterol panel is much better!

## 2021-02-09 ENCOUNTER — TRANSCRIBE ORDER (OUTPATIENT)
Dept: SCHEDULING | Age: 66
End: 2021-02-09

## 2021-02-09 DIAGNOSIS — Z12.31 SCREENING MAMMOGRAM, ENCOUNTER FOR: Primary | ICD-10-CM

## 2021-02-15 RX ORDER — MONTELUKAST SODIUM 10 MG/1
TABLET ORAL
Qty: 90 TAB | Refills: 0 | Status: SHIPPED | OUTPATIENT
Start: 2021-02-15 | End: 2022-04-20 | Stop reason: SDUPTHER

## 2021-02-17 DIAGNOSIS — E55.9 VITAMIN D DEFICIENCY: ICD-10-CM

## 2021-02-17 RX ORDER — ERGOCALCIFEROL 1.25 MG/1
CAPSULE ORAL
Qty: 12 CAP | Refills: 0 | Status: SHIPPED | OUTPATIENT
Start: 2021-02-17 | End: 2021-11-22

## 2021-03-11 DIAGNOSIS — E03.9 HYPOTHYROIDISM, UNSPECIFIED TYPE: ICD-10-CM

## 2021-03-15 RX ORDER — LEVOTHYROXINE SODIUM 112 UG/1
TABLET ORAL
Qty: 90 TAB | Refills: 0 | Status: SHIPPED | OUTPATIENT
Start: 2021-03-15

## 2021-03-31 DIAGNOSIS — Z87.09 HISTORY OF ASTHMA: ICD-10-CM

## 2021-04-01 RX ORDER — FLUTICASONE PROPIONATE 50 MCG
SPRAY, SUSPENSION (ML) NASAL
Qty: 16 G | Refills: 1 | Status: SHIPPED | OUTPATIENT
Start: 2021-04-01 | End: 2021-05-27 | Stop reason: SDUPTHER

## 2021-04-19 ENCOUNTER — HOSPITAL ENCOUNTER (OUTPATIENT)
Dept: MAMMOGRAPHY | Age: 66
Discharge: HOME | End: 2021-04-19
Attending: NURSE PRACTITIONER
Payer: MEDICARE

## 2021-04-19 DIAGNOSIS — Z12.31 SCREENING MAMMOGRAM, ENCOUNTER FOR: ICD-10-CM

## 2021-04-19 PROCEDURE — 77067 SCR MAMMO BI INCL CAD: CPT

## 2021-05-27 ENCOUNTER — HOSPITAL ENCOUNTER (OUTPATIENT)
Dept: LAB | Age: 66
Discharge: HOME | End: 2021-05-27
Payer: COMMERCIAL

## 2021-05-27 ENCOUNTER — OFFICE VISIT (OUTPATIENT)
Dept: FAMILY MEDICINE CLINIC | Age: 66
End: 2021-05-27
Payer: MEDICARE

## 2021-05-27 VITALS
RESPIRATION RATE: 16 BRPM | SYSTOLIC BLOOD PRESSURE: 133 MMHG | BODY MASS INDEX: 28.77 KG/M2 | HEIGHT: 66 IN | WEIGHT: 179 LBS | OXYGEN SATURATION: 97 % | TEMPERATURE: 97.6 F | HEART RATE: 79 BPM | DIASTOLIC BLOOD PRESSURE: 56 MMHG

## 2021-05-27 DIAGNOSIS — E78.5 DYSLIPIDEMIA: Primary | ICD-10-CM

## 2021-05-27 DIAGNOSIS — F17.200 TOBACCO USE DISORDER: ICD-10-CM

## 2021-05-27 DIAGNOSIS — Z87.09 HISTORY OF ASTHMA: ICD-10-CM

## 2021-05-27 DIAGNOSIS — R30.0 BURNING WITH URINATION: ICD-10-CM

## 2021-05-27 LAB
BILIRUB UR QL STRIP: NEGATIVE
GLUCOSE UR-MCNC: NEGATIVE MG/DL
KETONES P FAST UR STRIP-MCNC: NEGATIVE MG/DL
PH UR STRIP: 6.5 [PH] (ref 4.6–8)
PROT UR QL STRIP: NEGATIVE
SP GR UR STRIP: 1.02 (ref 1–1.03)
UA UROBILINOGEN AMB POC: NORMAL (ref 0.2–1)
URINALYSIS CLARITY POC: NORMAL
URINALYSIS COLOR POC: NORMAL
URINE BLOOD POC: NORMAL
URINE LEUKOCYTES POC: NEGATIVE
URINE NITRITES POC: NEGATIVE

## 2021-05-27 PROCEDURE — 81003 URINALYSIS AUTO W/O SCOPE: CPT | Performed by: NURSE PRACTITIONER

## 2021-05-27 PROCEDURE — 87086 URINE CULTURE/COLONY COUNT: CPT

## 2021-05-27 PROCEDURE — 99213 OFFICE O/P EST LOW 20 MIN: CPT | Performed by: NURSE PRACTITIONER

## 2021-05-27 RX ORDER — FLUTICASONE PROPIONATE 50 MCG
SPRAY, SUSPENSION (ML) NASAL
Qty: 16 G | Refills: 1 | Status: SHIPPED | OUTPATIENT
Start: 2021-05-27 | End: 2021-06-01

## 2021-05-27 NOTE — PROGRESS NOTES
Brianna Marie is a 73 y.o. female presenting today for Thyroid Problem (follow-up)  . Chief Complaint   Patient presents with    Thyroid Problem     follow-up       HPI:  Brianna Marie presents to the office today for hyperlipidemia follow-up care. She also carries a medical diagnosis for hypothyroidism but is managed by endocrinology. Patient is also is prescribed medication asthma and MND deficiency. She feels well. Negative for chest pain, palpitation lower extremity edema. She is compliant with the medication treatment plan. Hyperlipidemia continues to take pravastatin daily. BP controlled at 133/56. Patient is complaining of urinary burning sensation. Afebrile he denies any urgency or frequency. ROS    ROS:  History obtained from the patient intake forms which are reviewed with the patient  · General: negative for - chills, fever, weight changes or malaise  · HEENT: no sore throat, nasal congestion, vision problems or ear problems  · Respiratory: no cough, shortness of breath, or wheezing  · Cardiovascular: no chest pain, palpitations, or dyspnea on exertion  · Gastrointestinal: no abdominal pain, N/V, change in bowel habits, or black or bloody stools  · Musculoskeletal: no back pain, joint pain, joint stiffness, muscle pain or muscle weakness  · Neurological: no numbness, tingling, headache or dizziness  · Endo:  No polyuria or polydipsia. · : no hematuria, dysuria, frequency, hesitancy, or nocturia.     · Psychological: negative for - anxiety, depression, sleep disturbances, suicidal or homicidal ideations    Allergies   Allergen Reactions    Atorvastatin Other (comments)     Leg cramps and swelling    Sulfa (Sulfonamide Antibiotics) Hives       PHQ Screening   3 most recent PHQ Screens 5/27/2021   Little interest or pleasure in doing things Not at all   Feeling down, depressed, irritable, or hopeless Not at all   Total Score PHQ 2 0       History  Past Medical History:   Diagnosis Date    Asthma     childhood diagnosis; occasional inhaler use    Chest pain     Diverticulitis     Dyslipidemia     \"borderline\" per pt.  Endometriosis     GERD (gastroesophageal reflux disease)     Hypothyroidism     Normal nuclear stress test 09/14/2011    No ischemia or infarct. EF 75%. No WMA. Neg max EST w/nondiagnostic EKG changes and atypical chest discomfort at peak exercise.  Restless leg syndrome     Thyroid disease     Tobacco use disorder     ongoing       Past Surgical History:   Procedure Laterality Date    FOOT/TOES SURGERY PROC UNLISTED      HX HYSTERECTOMY      HX HYSTEROSCOPY      HX OVARIAN CYST REMOVAL      teenager    HX TUBAL LIGATION      30 years ago       Social History     Socioeconomic History    Marital status:      Spouse name: Not on file    Number of children: Not on file    Years of education: Not on file    Highest education level: Not on file   Occupational History    Not on file   Tobacco Use    Smoking status: Current Every Day Smoker     Packs/day: 0.25     Years: 25.00     Pack years: 6.25    Smokeless tobacco: Never Used   Substance and Sexual Activity    Alcohol use: Yes     Comment: social    Drug use: No    Sexual activity: Yes     Partners: Male     Birth control/protection: None   Other Topics Concern    Not on file   Social History Narrative    Not on file     Social Determinants of Health     Financial Resource Strain:     Difficulty of Paying Living Expenses:    Food Insecurity:     Worried About Running Out of Food in the Last Year:     Ran Out of Food in the Last Year:    Transportation Needs:     Lack of Transportation (Medical):      Lack of Transportation (Non-Medical):    Physical Activity:     Days of Exercise per Week:     Minutes of Exercise per Session:    Stress:     Feeling of Stress :    Social Connections:     Frequency of Communication with Friends and Family:     Frequency of Social Gatherings with Friends and Family:     Attends Church Services:     Active Member of Clubs or Organizations:     Attends Club or Organization Meetings:     Marital Status:    Intimate Partner Violence:     Fear of Current or Ex-Partner:     Emotionally Abused:     Physically Abused:     Sexually Abused:        Current Outpatient Medications   Medication Sig Dispense Refill    fluticasone propionate (FLONASE) 50 mcg/actuation nasal spray SHAKE LIQUID AND USE 2 SPRAYS IN EACH NOSTRIL DAILY 16 g 1    levothyroxine (SYNTHROID) 112 mcg tablet TAKE 1 TABLET BY MOUTH EVERY DAY BEFORE BREAKFAST 90 Tab 0    ergocalciferol (ERGOCALCIFEROL) 1,250 mcg (50,000 unit) capsule TAKE 1 CAPSULE BY MOUTH EVERY 7 DAYS 12 Cap 0    montelukast (SINGULAIR) 10 mg tablet TAKE 1 TABLET BY MOUTH EVERY DAY 90 Tab 0    pravastatin (PRAVACHOL) 40 mg tablet TAKE 1 TABLET BY MOUTH EVERY NIGHT 90 Tab 1    valACYclovir (VALTREX) 1 gram tablet Take 1 Tab by mouth two (2) times daily as needed for Other (HSV-1). 20 Tab 0    ibuprofen (MOTRIN) 600 mg tablet Take 1 Tab by mouth every six (6) hours as needed for Pain. 20 Tab 0    albuterol (PROVENTIL HFA, VENTOLIN HFA, PROAIR HFA) 90 mcg/actuation inhaler INHALE 2 PUFFS BY MOUTH EVERY 4 HOURS AS NEEDED FOR WHEEZING (Patient not taking: Reported on 5/27/2021) 90 g 2    pravastatin (PRAVACHOL) 40 mg tablet TAKE 1 TABLET BY MOUTH EVERY NIGHT (Patient not taking: Reported on 5/27/2021) 30 Tab 0    cyclobenzaprine (FLEXERIL) 10 mg tablet Take 1 Tab by mouth three (3) times daily as needed for Muscle Spasm(s). (Patient not taking: Reported on 5/27/2021) 15 Tab 0    naltrexone-buPROPion (CONTRAVE) 8-90 mg TbER ER tablet Week 1 1 tab PO QAM, Week 2 1QAM 1QHS, Week 3 2QAM 1 QHS, Week 4 & beyond 2QAM 2QHS (Patient not taking: Reported on 5/27/2021) 162 Tab 0    cholecalciferol, VITAMIN D3, (VITAMIN D3) 5,000 unit tab tablet Take  by mouth daily.  (Patient not taking: Reported on 5/27/2021)           Vitals:    05/27/21 0831   BP: (!) 133/56   Pulse: 79   Resp: 16   Temp: 97.6 °F (36.4 °C)   TempSrc: Oral   SpO2: 97%   Weight: 179 lb (81.2 kg)   Height: 5' 6\" (1.676 m)   PainSc:   0 - No pain       Physical Exam  Vitals reviewed. Constitutional:       Appearance: Normal appearance. HENT:      Head: Normocephalic. Cardiovascular:      Rate and Rhythm: Normal rate and regular rhythm. Pulses: Normal pulses. Heart sounds: Normal heart sounds. Pulmonary:      Effort: Pulmonary effort is normal.      Breath sounds: Normal breath sounds. Abdominal:      General: Bowel sounds are normal.      Palpations: Abdomen is soft. Skin:     General: Skin is warm and dry. Neurological:      General: No focal deficit present. Mental Status: She is alert.          Office Visit on 05/27/2021   Component Date Value Ref Range Status    Color (UA POC) 05/27/2021 Dark Yellow   Final    Clarity (UA POC) 05/27/2021 Slightly Cloudy   Final    Glucose (UA POC) 05/27/2021 Negative  Negative Final    Bilirubin (UA POC) 05/27/2021 Negative  Negative Final    Ketones (UA POC) 05/27/2021 Negative  Negative Final    Specific gravity (UA POC) 05/27/2021 1.020  1.001 - 1.035 Final    Blood (UA POC) 05/27/2021 3+  Negative Final    pH (UA POC) 05/27/2021 6.5  4.6 - 8.0 Final    Protein (UA POC) 05/27/2021 Negative  Negative Final    Urobilinogen (UA POC) 05/27/2021 0.2 mg/dL  0.2 - 1 Final    Nitrites (UA POC) 05/27/2021 Negative  Negative Final    Leukocyte esterase (UA POC) 05/27/2021 Negative  Negative Final       Results for orders placed or performed in visit on 05/27/21   AMB POC URINALYSIS DIP STICK AUTO W/O MICRO   Result Value Ref Range    Color (UA POC) Dark Yellow     Clarity (UA POC) Slightly Cloudy     Glucose (UA POC) Negative Negative    Bilirubin (UA POC) Negative Negative    Ketones (UA POC) Negative Negative    Specific gravity (UA POC) 1.020 1.001 - 1.035    Blood (UA POC) 3+ Negative    pH (UA POC) 6.5 4.6 - 8.0    Protein (UA POC) Negative Negative    Urobilinogen (UA POC) 0.2 mg/dL 0.2 - 1    Nitrites (UA POC) Negative Negative    Leukocyte esterase (UA POC) Negative Negative       Patient Care Team:  Patient Care Team:  Emily Nielson NP as PCP - General (Nurse Practitioner)  Emily Nielson NP as PCP - [facility] Empaneled Provider  Juana Chang RN as Nurse Chip Colmenares MD (Surgery)      Assessment / Plan:      ICD-10-CM ICD-9-CM    1. Dyslipidemia  E78.5 272.4 CBC WITH AUTOMATED DIFF      LIPID PANEL      METABOLIC PANEL, COMPREHENSIVE   2. History of asthma  Z87.09 V12.69 fluticasone propionate (FLONASE) 50 mcg/actuation nasal spray   3. Tobacco use disorder  F17.200 305.1 CT LOW DOSE LUNG CANCER SCREENING   4. Burning with urination  R30.0 788.1 AMB POC URINALYSIS DIP STICK AUTO W/O MICRO      URINALYSIS W/MICROSCOPIC      CULTURE, URINE     Tobacco abuse-CT low-dose lung cancer screening  Urinalysis with culture ordered  Fasting labs ordered  Dyslipidemia-continue current treatment plan  Follow-up and Dispositions    · Return in about 4 months (around 9/27/2021). I asked the patient if she  had any questions and answered her  questions. The patient stated that she understands the treatment plan and agrees with the treatment plan    This document was created with a voice activated dictation system and may contain transcription errors.

## 2021-05-27 NOTE — PROGRESS NOTES
Brianna Marie presents today for   Chief Complaint   Patient presents with    Thyroid Problem     follow-up       Is someone accompanying this pt? no    Is the patient using any DME equipment during [address]? no    Depression Screening:  3 most recent PHQ Screens 5/27/2021   Little interest or pleasure in doing things Not at all   Feeling down, depressed, irritable, or hopeless Not at all   Total Score PHQ 2 0       Learning Assessment:  Learning Assessment 5/18/2018   PRIMARY LEARNER Patient   BARRIERS PRIMARY LEARNER NONE   PRIMARY LANGUAGE ENGLISH   LEARNER PREFERENCE PRIMARY READING     LISTENING     -   ANSWERED BY self   RELATIONSHIP SELF       Abuse Screening:  Abuse Screening Questionnaire 11/24/2020   Do you ever feel afraid of your partner? N   Are you in a relationship with someone who physically or mentally threatens you? N   Is it safe for you to go home? Y       Fall Risk  Fall Risk Assessment, last 12 mths 5/27/2021   Able to walk? Yes   Fall in past 12 months? 0   Do you feel unsteady? 0   Are you worried about falling 0       Health Maintenance reviewed and discussed and ordered per Provider. Health Maintenance Due   Topic Date Due    COVID-19 Vaccine (1) Never done    Shingrix Vaccine Age 50> (1 of 2) Never done    Pneumococcal 65+ years (1 of 1 - PPSV23) Never done   . Coordination of Care:  1. Have you been to the ER, urgent care clinic since your last visit? Hospitalized since your last visit? no    2. Have you seen or consulted any other health care providers outside of the [address] [place] [address] since your last visit? Include any pap smears or colon screening.  no

## 2021-05-28 LAB
BACTERIA SPEC CULT: NORMAL
SERVICE CMNT-IMP: NORMAL

## 2021-06-01 RX ORDER — FLUTICASONE PROPIONATE 50 MCG
SPRAY, SUSPENSION (ML) NASAL
Qty: 48 G | Refills: 2 | Status: SHIPPED | OUTPATIENT
Start: 2021-06-01 | End: 2021-07-05

## 2021-06-10 ENCOUNTER — NURSE NAVIGATOR (OUTPATIENT)
Dept: OTHER | Age: 66
End: 2021-06-10

## 2021-06-10 NOTE — PROGRESS NOTES
Referring Provider: Andrews Machado NP      Lung Cancer Risk Profile:   Age: 73  Gender: Female  Height: 66\"  Weight: 179#    Smoking History:  Smoking Status: current use  # years smoking:   # years quit: 0  Packs/day:   Pack years: 28    Patient discussed smoking cessation with PCP: Unknown    Patient participated in shared decision making process with PCP: Unknown    Patient is currently experiencing symptoms: No    If yes what symptoms:     Co-Morbidities:      Cancer History:  Uncle with lung cancer    Additional Risk Factors:        Occupational exposure to asbestos       Patient's smoking history verified via EMR and provider note. Patient meets LDCT lung cancer screening criteria.        CITLALLI SterlingN, RN, OCN  Lung Health Nurse Navigator

## 2021-06-12 DIAGNOSIS — E78.5 DYSLIPIDEMIA: ICD-10-CM

## 2021-06-14 ENCOUNTER — HOSPITAL ENCOUNTER (OUTPATIENT)
Dept: CT IMAGING | Age: 66
Discharge: HOME | End: 2021-06-14
Attending: NURSE PRACTITIONER
Payer: MEDICARE

## 2021-06-14 DIAGNOSIS — F17.200 TOBACCO USE DISORDER: ICD-10-CM

## 2021-06-14 PROCEDURE — 71271 CT THORAX LUNG CANCER SCR C-: CPT

## 2021-06-14 RX ORDER — PRAVASTATIN SODIUM 40 MG/1
TABLET ORAL
Qty: 90 TABLET | Refills: 1 | Status: SHIPPED | OUTPATIENT
Start: 2021-06-14 | End: 2021-12-19

## 2021-06-16 NOTE — PROGRESS NOTES
Please let patient know the xray results showed   Small stable 2-3 mm pulmonary nodules. Lung-RADS 2 - Benign appearance or behavior.   Management: Continue annual screening with low dose CT in 12 months.     Previously seen endobronchial nodular density in the right lower lobe has  resolved, consistent with mucous plugging.     Mild emphysema and bronchitis.     Overall normal scan with mild emphysema and bronchitis

## 2021-06-30 DIAGNOSIS — Z87.09 HISTORY OF ASTHMA: ICD-10-CM

## 2021-07-05 RX ORDER — FLUTICASONE PROPIONATE 50 MCG
SPRAY, SUSPENSION (ML) NASAL
Qty: 48 G | Refills: 2 | Status: SHIPPED | OUTPATIENT
Start: 2021-07-05 | End: 2021-08-06

## 2021-07-29 DIAGNOSIS — Z87.09 HISTORY OF ASTHMA: ICD-10-CM

## 2021-07-30 NOTE — TELEPHONE ENCOUNTER
Requested Prescriptions     Pending Prescriptions Disp Refills    albuterol (PROVENTIL HFA, VENTOLIN HFA, PROAIR HFA) 90 mcg/actuation inhaler [Pharmacy Med Name: ALBUTEROL HFA INH(200 PUFFS)18GM] 18 g      Sig: INHALE 2 PUFFS BY MOUTH EVERY 4 HOURS AS NEEDED FOR WHEEZING

## 2021-08-02 RX ORDER — ALBUTEROL SULFATE 90 UG/1
AEROSOL, METERED RESPIRATORY (INHALATION)
Qty: 18 G | Refills: 2 | Status: SHIPPED | OUTPATIENT
Start: 2021-08-02 | End: 2022-10-25 | Stop reason: SDUPTHER

## 2021-08-05 DIAGNOSIS — Z87.09 HISTORY OF ASTHMA: ICD-10-CM

## 2021-08-06 DIAGNOSIS — Z87.09 HISTORY OF ASTHMA: ICD-10-CM

## 2021-08-06 RX ORDER — FLUTICASONE PROPIONATE 50 MCG
SPRAY, SUSPENSION (ML) NASAL
Qty: 48 G | Refills: 2 | Status: SHIPPED | OUTPATIENT
Start: 2021-08-06 | End: 2021-08-09

## 2021-08-09 RX ORDER — FLUTICASONE PROPIONATE 50 MCG
SPRAY, SUSPENSION (ML) NASAL
Qty: 16 G | Refills: 2 | Status: SHIPPED | OUTPATIENT
Start: 2021-08-09 | End: 2021-09-20

## 2021-08-27 LAB — SARS-COV-2, NAA: NOT DETECTED

## 2021-09-08 NOTE — TELEPHONE ENCOUNTER
Letter done.
Patient called stating she spoke with Ray Ghosh on Saturday and was suppose to have a letter for her. I do not see a letter in the chart.   Please call patient at [phone]
TC made to pt and pt verified name and d.ob. pt was made aware that NP Hong Perez is out of the office today and we she returns tomorrow I will give her the message fot pt letter and I will call the pt when letter is complete and ready for  . Pt verbalized understanding.
Yes

## 2021-09-16 DIAGNOSIS — B00.9 HSV-1 INFECTION: ICD-10-CM

## 2021-09-16 DIAGNOSIS — Z87.09 HISTORY OF ASTHMA: ICD-10-CM

## 2021-09-20 RX ORDER — VALACYCLOVIR HYDROCHLORIDE 1 G/1
TABLET, FILM COATED ORAL
Qty: 20 TABLET | Refills: 0 | Status: SHIPPED | OUTPATIENT
Start: 2021-09-20 | End: 2021-12-16 | Stop reason: SDUPTHER

## 2021-09-20 RX ORDER — FLUTICASONE PROPIONATE 50 MCG
SPRAY, SUSPENSION (ML) NASAL
Qty: 48 G | Refills: 1 | Status: SHIPPED | OUTPATIENT
Start: 2021-09-20 | End: 2021-10-20

## 2021-10-19 DIAGNOSIS — Z87.09 HISTORY OF ASTHMA: ICD-10-CM

## 2021-10-20 RX ORDER — FLUTICASONE PROPIONATE 50 MCG
SPRAY, SUSPENSION (ML) NASAL
Qty: 48 G | Refills: 1 | Status: SHIPPED | OUTPATIENT
Start: 2021-10-20

## 2021-11-19 DIAGNOSIS — E55.9 VITAMIN D DEFICIENCY: ICD-10-CM

## 2021-11-22 RX ORDER — ERGOCALCIFEROL 1.25 MG/1
CAPSULE ORAL
Qty: 12 CAPSULE | Refills: 0 | Status: SHIPPED | OUTPATIENT
Start: 2021-11-22 | End: 2022-02-27

## 2021-11-29 ENCOUNTER — HOSPITAL ENCOUNTER (OUTPATIENT)
Dept: LAB | Age: 66
Discharge: HOME | End: 2021-11-29
Payer: MEDICARE

## 2021-11-29 DIAGNOSIS — E78.5 DYSLIPIDEMIA: ICD-10-CM

## 2021-11-29 LAB
ALBUMIN SERPL-MCNC: 3.8 G/DL (ref 3.4–5)
ALBUMIN/GLOB SERPL: 1.2 {RATIO} (ref 0.8–1.7)
ALP SERPL-CCNC: 59 U/L (ref 45–117)
ALT SERPL-CCNC: 14 U/L (ref 13–56)
ANION GAP SERPL CALC-SCNC: 3 MMOL/L (ref 3–18)
AST SERPL-CCNC: 12 U/L (ref 10–38)
BASOPHILS # BLD: 0.1 K/UL (ref 0–0.1)
BASOPHILS NFR BLD: 1 % (ref 0–2)
BILIRUB SERPL-MCNC: 0.6 MG/DL (ref 0.2–1)
BUN SERPL-MCNC: 16 MG/DL (ref 7–18)
BUN/CREAT SERPL: 24 (ref 12–20)
CALCIUM SERPL-MCNC: 9.9 MG/DL (ref 8.5–10.1)
CHLORIDE SERPL-SCNC: 109 MMOL/L (ref 100–111)
CHOLEST SERPL-MCNC: 187 MG/DL
CO2 SERPL-SCNC: 29 MMOL/L (ref 21–32)
CREAT SERPL-MCNC: 0.67 MG/DL (ref 0.6–1.3)
DIFFERENTIAL METHOD BLD: ABNORMAL
EOSINOPHIL # BLD: 0.2 K/UL (ref 0–0.4)
EOSINOPHIL NFR BLD: 3 % (ref 0–5)
ERYTHROCYTE [DISTWIDTH] IN BLOOD BY AUTOMATED COUNT: 12.3 % (ref 11.6–14.5)
GLOBULIN SER CALC-MCNC: 3.1 G/DL (ref 2–4)
GLUCOSE SERPL-MCNC: 95 MG/DL (ref 74–99)
HCT VFR BLD AUTO: 41.4 % (ref 35–45)
HDLC SERPL-MCNC: 63 MG/DL (ref 40–60)
HDLC SERPL: 3 {RATIO} (ref 0–5)
HGB BLD-MCNC: 13.6 G/DL (ref 12–16)
IMM GRANULOCYTES # BLD AUTO: 0 K/UL (ref 0–0.04)
IMM GRANULOCYTES NFR BLD AUTO: 0 % (ref 0–0.5)
LDLC SERPL CALC-MCNC: 106.4 MG/DL (ref 0–100)
LIPID PROFILE,FLP: ABNORMAL
LYMPHOCYTES # BLD: 1.9 K/UL (ref 0.9–3.6)
LYMPHOCYTES NFR BLD: 30 % (ref 21–52)
MCH RBC QN AUTO: 31.8 PG (ref 24–34)
MCHC RBC AUTO-ENTMCNC: 32.9 G/DL (ref 31–37)
MCV RBC AUTO: 96.7 FL (ref 78–100)
MONOCYTES # BLD: 0.4 K/UL (ref 0.05–1.2)
MONOCYTES NFR BLD: 6 % (ref 3–10)
NEUTS SEG # BLD: 3.9 K/UL (ref 1.8–8)
NEUTS SEG NFR BLD: 60 % (ref 40–73)
NRBC # BLD: 0 K/UL (ref 0–0.01)
NRBC BLD-RTO: 0 PER 100 WBC
PLATELET # BLD AUTO: 241 K/UL (ref 135–420)
PMV BLD AUTO: 8.8 FL (ref 9.2–11.8)
POTASSIUM SERPL-SCNC: 4 MMOL/L (ref 3.5–5.5)
PROT SERPL-MCNC: 6.9 G/DL (ref 6.4–8.2)
RBC # BLD AUTO: 4.28 M/UL (ref 4.2–5.3)
SODIUM SERPL-SCNC: 141 MMOL/L (ref 136–145)
TRIGL SERPL-MCNC: 88 MG/DL (ref ?–150)
VLDLC SERPL CALC-MCNC: 17.6 MG/DL
WBC # BLD AUTO: 6.4 K/UL (ref 4.6–13.2)

## 2021-11-29 PROCEDURE — 85025 COMPLETE CBC W/AUTO DIFF WBC: CPT

## 2021-11-29 PROCEDURE — 80061 LIPID PANEL: CPT

## 2021-11-29 PROCEDURE — 36415 COLL VENOUS BLD VENIPUNCTURE: CPT

## 2021-11-29 PROCEDURE — 80053 COMPREHEN METABOLIC PANEL: CPT

## 2021-12-16 ENCOUNTER — OFFICE VISIT (OUTPATIENT)
Dept: FAMILY MEDICINE CLINIC | Age: 66
End: 2021-12-16
Payer: COMMERCIAL

## 2021-12-16 VITALS
HEIGHT: 66 IN | TEMPERATURE: 97.8 F | DIASTOLIC BLOOD PRESSURE: 70 MMHG | WEIGHT: 179 LBS | HEART RATE: 78 BPM | OXYGEN SATURATION: 97 % | BODY MASS INDEX: 28.77 KG/M2 | SYSTOLIC BLOOD PRESSURE: 138 MMHG | RESPIRATION RATE: 16 BRPM

## 2021-12-16 DIAGNOSIS — B00.9 HSV-1 INFECTION: ICD-10-CM

## 2021-12-16 DIAGNOSIS — E78.5 DYSLIPIDEMIA: ICD-10-CM

## 2021-12-16 DIAGNOSIS — F17.200 TOBACCO USE DISORDER: ICD-10-CM

## 2021-12-16 DIAGNOSIS — M54.50 LUMBOSACRAL PAIN: ICD-10-CM

## 2021-12-16 DIAGNOSIS — E03.9 HYPOTHYROIDISM, UNSPECIFIED TYPE: ICD-10-CM

## 2021-12-16 DIAGNOSIS — M79.603 UPPER EXTREMITY PAIN, INFERIOR, UNSPECIFIED LATERALITY: ICD-10-CM

## 2021-12-16 DIAGNOSIS — Z00.00 WELCOME TO MEDICARE PREVENTIVE VISIT: Primary | ICD-10-CM

## 2021-12-16 PROCEDURE — 3017F COLORECTAL CA SCREEN DOC REV: CPT | Performed by: NURSE PRACTITIONER

## 2021-12-16 PROCEDURE — G8419 CALC BMI OUT NRM PARAM NOF/U: HCPCS | Performed by: NURSE PRACTITIONER

## 2021-12-16 PROCEDURE — G8536 NO DOC ELDER MAL SCRN: HCPCS | Performed by: NURSE PRACTITIONER

## 2021-12-16 PROCEDURE — G8432 DEP SCR NOT DOC, RNG: HCPCS | Performed by: NURSE PRACTITIONER

## 2021-12-16 PROCEDURE — 1090F PRES/ABSN URINE INCON ASSESS: CPT | Performed by: NURSE PRACTITIONER

## 2021-12-16 PROCEDURE — G9899 SCRN MAM PERF RSLTS DOC: HCPCS | Performed by: NURSE PRACTITIONER

## 2021-12-16 PROCEDURE — G0402 INITIAL PREVENTIVE EXAM: HCPCS | Performed by: NURSE PRACTITIONER

## 2021-12-16 PROCEDURE — 99214 OFFICE O/P EST MOD 30 MIN: CPT | Performed by: NURSE PRACTITIONER

## 2021-12-16 PROCEDURE — 1101F PT FALLS ASSESS-DOCD LE1/YR: CPT | Performed by: NURSE PRACTITIONER

## 2021-12-16 PROCEDURE — G8399 PT W/DXA RESULTS DOCUMENT: HCPCS | Performed by: NURSE PRACTITIONER

## 2021-12-16 PROCEDURE — G8427 DOCREV CUR MEDS BY ELIG CLIN: HCPCS | Performed by: NURSE PRACTITIONER

## 2021-12-16 RX ORDER — VALACYCLOVIR HYDROCHLORIDE 1 G/1
TABLET, FILM COATED ORAL
Qty: 20 TABLET | Refills: 0 | Status: SHIPPED | OUTPATIENT
Start: 2021-12-16 | End: 2022-05-05 | Stop reason: SDUPTHER

## 2021-12-16 NOTE — PATIENT INSTRUCTIONS
Medicare Wellness Visit, Female     The best way to live healthy is to have a lifestyle where you eat a well-balanced diet, exercise regularly, limit alcohol use, and quit all forms of tobacco/nicotine, if applicable. Regular preventive services are another way to keep healthy. Preventive services (vaccines, screening tests, monitoring & exams) can help personalize your care plan, which helps you manage your own care. Screening tests can find health problems at the earliest stages, when they are easiest to treat. [facility] follows the current, evidence-based guidelines published by the [facility] ([facility]) when recommending preventive services for our patients. Because we follow these guidelines, sometimes recommendations change over time as research supports it. (For example, mammograms used to be recommended annually. Even though Medicare will still pay for an annual mammogram, the newer guidelines recommend a mammogram every two years for women of average risk). Of course, you and your doctor may decide to screen more often for some diseases, based on your risk and your co-morbidities (chronic disease you are already diagnosed with). Preventive services for you include:  - Medicare offers their members a free annual wellness visit, which is time for you and your primary care provider to discuss and plan for your preventive service needs. Take advantage of this benefit every year!  -All adults over the age of 72 should receive the recommended pneumonia vaccines. Current USPSTF guidelines recommend a series of two vaccines for the best pneumonia protection.   -All adults should have a flu vaccine yearly and a tetanus vaccine every 10 years.   -All adults age 48 and older should receive the shingles vaccines (series of two vaccines).       -All adults age 38-68 who are overweight should have a diabetes screening test once every three years.   -All adults born between [birth year] and 1965 should be screened once for Hepatitis C.  -Other screening tests and preventive services for persons with diabetes include: an eye exam to screen for diabetic retinopathy, a kidney function test, a foot exam, and stricter control over your cholesterol.   -Cardiovascular screening for adults with routine risk involves an electrocardiogram (ECG) at intervals determined by your doctor.   -Colorectal cancer screenings should be done for adults age 54-65 with no increased risk factors for colorectal cancer. There are a number of acceptable methods of screening for this type of cancer. Each test has its own benefits and drawbacks. Discuss with your doctor what is most appropriate for you during your annual wellness visit. The different tests include: colonoscopy (considered the best screening method), a fecal occult blood test, a fecal DNA test, and sigmoidoscopy.    -A bone mass density test is recommended when a woman turns 65 to screen for osteoporosis. This test is only recommended one time, as a screening. Some providers will use this same test as a disease monitoring tool if you already have osteoporosis. -Breast cancer screenings are recommended every other year for women of normal risk, age 54-69.  -Cervical cancer screenings for women over age 72 are only recommended with certain risk factors.      Here is a list of your current Health Maintenance items (your personalized list of preventive services) with a due date:  Health Maintenance Due   Topic Date Due    COVID-19 Vaccine (1) Never done    Shingles Vaccine (1 of 2) Never done    Pneumococcal Vaccine (1 of 1 - PPSV23) Never done    Yearly Flu Vaccine (1) 09/01/2021    Colorectal Screening  11/28/2021

## 2021-12-16 NOTE — PROGRESS NOTES
This is a \"Welcome to [employer]"  Initial Preventive Physical Examination (IPPE) providing Personalized Prevention Plan Services (Performed in the first 12 months of enrollment)    I have reviewed the patient's medical history in detail and updated the computerized patient record. Assessment/Plan   Education and counseling provided:  Are appropriate based on today's review and evaluation    1. Welcome to Medicare preventive visit       Depression Risk Screen     3 most recent PHQ Screens 5/27/2021   Little interest or pleasure in doing things Not at all   Feeling down, depressed, irritable, or hopeless Not at all   Total Score PHQ 2 0       Alcohol Risk Screen    Do you average more than 1 drink per night or more than 7 drinks a week:  Yes    On any one occasion in the past three months have you have had more than 3 drinks containing alcohol:  Yes          Functional Ability and Level of Safety    Diet: No special diet      Hearing: Hearing is good. Vision Screening:  Vision is good. No exam data present      Activities of Daily Living: The home contains: no safety equipment. Patient does total self care      Ambulation: with no difficulty      Exercise level: moderately active     Fall Risk Screen:  Fall Risk Assessment, last 12 mths 5/27/2021   Able to walk? Yes   Fall in past 12 months? 0   Do you feel unsteady? 0   Are you worried about falling 0      Abuse Screen:  Patient is not abused       Screening EKG   EKG order placed: No    End of Life Planning   Advanced care planning directives were discussed with the patient and /or family/caregiver.      Health Maintenance Due     Health Maintenance Due   Topic Date Due    COVID-19 Vaccine (1) Never done    Shingrix Vaccine Age 50> (1 of 2) Never done    Pneumococcal 65+ years (1 of 1 - PPSV23) Never done    Flu Vaccine (1) 09/01/2021    Colorectal Cancer Screening Combo  11/28/2021       Patient Care Team   Patient Care Team:  Morgan Dejesus LORA NP as PCP - General (Nurse Practitioner)  Kodak Dupont NP as PCP - [address] [place] [address] [facility]  Smitha Pavon, RN as Nurse Herminia Blackman MD (Surgery)    History     Past Medical History:   Diagnosis Date    Asthma     childhood diagnosis; occasional inhaler use    Chest pain     Diverticulitis     Dyslipidemia     \"borderline\" per pt.  Endometriosis     GERD (gastroesophageal reflux disease)     Hypothyroidism     Normal nuclear stress test 09/14/2011    No ischemia or infarct. EF 75%. No WMA. Neg max EST w/nondiagnostic EKG changes and atypical chest discomfort at peak exercise.  Restless leg syndrome     Thyroid disease     Tobacco use disorder     ongoing      Past Surgical History:   Procedure Laterality Date    FOOT/TOES SURGERY PROC UNLISTED      HX HYSTERECTOMY      HX HYSTEROSCOPY      HX OVARIAN CYST REMOVAL      teenager    HX TUBAL LIGATION      30 years ago     Current Outpatient Medications   Medication Sig Dispense Refill    ergocalciferol (ERGOCALCIFEROL) 1,250 mcg (50,000 unit) capsule TAKE 1 CAPSULE BY MOUTH EVERY 7 DAYS.  12 Capsule 0    fluticasone propionate (FLONASE) 50 mcg/actuation nasal spray SHAKE LIQUID AND USE 2 SPRAYS IN EACH NOSTRIL DAILY 48 g 1    valACYclovir (VALTREX) 1 gram tablet TAKE 1 TABLET BY MOUTH TWICE DAILY AS NEEDED 20 Tablet 0    albuterol (PROVENTIL HFA, VENTOLIN HFA, PROAIR HFA) 90 mcg/actuation inhaler INHALE 2 PUFFS BY MOUTH EVERY 4 HOURS AS NEEDED FOR WHEEZING 18 g 2    pravastatin (PRAVACHOL) 40 mg tablet TAKE 1 TABLET BY MOUTH EVERY NIGHT 90 Tablet 1    levothyroxine (SYNTHROID) 112 mcg tablet TAKE 1 TABLET BY MOUTH EVERY DAY BEFORE BREAKFAST 90 Tab 0    montelukast (SINGULAIR) 10 mg tablet TAKE 1 TABLET BY MOUTH EVERY DAY 90 Tab 0    pravastatin (PRAVACHOL) 40 mg tablet TAKE 1 TABLET BY MOUTH EVERY NIGHT (Patient not taking: Reported on 5/27/2021) 30 Tab 0    ibuprofen (MOTRIN) 600 mg tablet Take 1 Tab by mouth every six (6) hours as needed for Pain. 20 Tab 0    cyclobenzaprine (FLEXERIL) 10 mg tablet Take 1 Tab by mouth three (3) times daily as needed for Muscle Spasm(s). (Patient not taking: Reported on 5/27/2021) 15 Tab 0    naltrexone-buPROPion (CONTRAVE) 8-90 mg TbER ER tablet Week 1 1 tab PO QAM, Week 2 1QAM 1QHS, Week 3 2QAM 1 QHS, Week 4 & beyond 2QAM 2QHS (Patient not taking: Reported on 5/27/2021) 162 Tab 0    cholecalciferol, VITAMIN D3, (VITAMIN D3) 5,000 unit tab tablet Take  by mouth daily.  (Patient not taking: Reported on 5/27/2021)       Allergies   Allergen Reactions    Atorvastatin Other (comments)     Leg cramps and swelling    Sulfa (Sulfonamide Antibiotics) Hives       Family History   Problem Relation Age of Onset    Lung Disease Mother     Cancer Father     Cancer Sister 52        breast cancer    Asthma Sister     Stroke Sister     Asthma Brother     Heart Disease Brother     Hypertension Brother      Social History     Tobacco Use    Smoking status: Current Every Day Smoker     Packs/day: 0.25     Years: 25.00     Pack years: 6.25    Smokeless tobacco: Never Used   Substance Use Topics    Alcohol use: Yes     Comment: social Beth Razo

## 2021-12-16 NOTE — PROGRESS NOTES
Brianna Marie presents today for   Chief Complaint   Patient presents with    Thyroid Problem    Annual Wellness Visit    Back Pain       Is someone accompanying this pt? no    Is the patient using any DME equipment during OV? no    Depression Screening:  3 most recent PHQ Screens 5/27/2021   Little interest or pleasure in doing things Not at all   Feeling down, depressed, irritable, or hopeless Not at all   Total Score PHQ 2 0       Learning Assessment:  Learning Assessment 5/18/2018   PRIMARY LEARNER Patient   BARRIERS PRIMARY LEARNER NONE   PRIMARY LANGUAGE ENGLISH   LEARNER PREFERENCE PRIMARY READING     LISTENING     -   ANSWERED BY self   RELATIONSHIP SELF       Abuse Screening:  Abuse Screening Questionnaire 11/24/2020   Do you ever feel afraid of your partner? N   Are you in a relationship with someone who physically or mentally threatens you? N   Is it safe for you to go home? Y       Fall Risk  Fall Risk Assessment, last 12 mths 5/27/2021   Able to walk? Yes   Fall in past 12 months? 0   Do you feel unsteady? 0   Are you worried about falling 0       Health Maintenance reviewed and discussed and ordered per Provider. Health Maintenance Due   Topic Date Due    COVID-19 Vaccine (1) Never done    Shingrix Vaccine Age 50> (1 of 2) Never done    Pneumococcal 65+ years (1 of 1 - PPSV23) Never done    Flu Vaccine (1) 09/01/2021    Colorectal Cancer Screening Combo  11/28/2021   . Coordination of Care:  1. Have you been to the ER, urgent care clinic since your last visit? Hospitalized since your last visit? no    2. Have you seen or consulted any other health care providers outside of the [address] [place] [address] since your last visit? Include any pap smears or colon screening.  no

## 2021-12-16 NOTE — PROGRESS NOTES
Brianna Marie is a 73 y.o. female presenting today for Thyroid Problem, Annual Wellness Visit, and Back Pain  . Chief Complaint   Patient presents with    Thyroid Problem    Annual Wellness Visit    Back Pain       HPI:  Ronald Marie presents to the office today for hypothyroidism and hyperlipidemia. She also carries a medical diagnosis for vitamin D deficiency. Hypothyroidism-no recent TSH level. Last TSH level was in 2019. And was 1.65. Patient informed she needs fasting labs with the next office visit. Patient had some labs done prior to today's visit and the results were reviewed. Hyperlipidemia-previous lipid panel completed on 11/29/2021. Her cholesterol is 187, triglycerides 88, HDL is a 63 and . Approximately twice monthly she has right shoulder pain that last approimately 48 hours. She notes the area \"hurts really bad\" and then abruptly stops. The pain generally last about 2 to 3 days she has decreased range of motion. Negative for any paresthesia. Negative for any pain today. Right lumbar back x 5 months. Denies any radiculopathy and negative for saddle anesthesia. Denies any injury. 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 Review of Systems   Constitutional: Negative for malaise/fatigue. Respiratory: Negative for cough and shortness of breath. Cardiovascular: Negative for chest pain, palpitations and leg swelling. Gastrointestinal: Negative for abdominal pain, nausea and vomiting. Genitourinary: Negative for dysuria. Musculoskeletal: Positive for back pain ( Chronic) and joint pain ( Intermittent shoulder pain). Negative for myalgias. Neurological: Negative for dizziness and headaches.        Allergies   Allergen Reactions    Atorvastatin Other (comments)     Leg cramps and swelling    Sulfa (Sulfonamide Antibiotics) Hives       PHQ Screening   3 most recent PHQ Screens 12/16/2021   Little interest or pleasure in doing things Not at all   Feeling down, depressed, irritable, or hopeless Not at all   Total Score PHQ 2 0       History  Past Medical History:   Diagnosis Date    Asthma     childhood diagnosis; occasional inhaler use    Chest pain     Diverticulitis     Dyslipidemia     \"borderline\" per pt.  Endometriosis     GERD (gastroesophageal reflux disease)     Hypothyroidism     Normal nuclear stress test 09/14/2011    No ischemia or infarct. EF 75%. No WMA. Neg max EST w/nondiagnostic EKG changes and atypical chest discomfort at peak exercise.  Restless leg syndrome     Thyroid disease     Tobacco use disorder     ongoing       Past Surgical History:   Procedure Laterality Date    FOOT/TOES SURGERY PROC UNLISTED      HX HYSTERECTOMY      HX HYSTEROSCOPY      HX OVARIAN CYST REMOVAL      teenager    HX TUBAL LIGATION      30 years ago       Social History     Socioeconomic History    Marital status:      Spouse name: Not on file    Number of children: Not on file    Years of education: Not on file    Highest education level: Not on file   Occupational History    Not on file   Tobacco Use    Smoking status: Current Every Day Smoker     Packs/day: 0.25     Years: 25.00     Pack years: 6.25    Smokeless tobacco: Never Used   Substance and Sexual Activity    Alcohol use: Yes     Comment: social    Drug use: No    Sexual activity: Yes     Partners: Male     Birth control/protection: None   Other Topics Concern    Not on file   Social History Narrative    Not on file     Social Determinants of Health     Financial Resource Strain:     Difficulty of Paying Living Expenses: Not on file   Food Insecurity:     Worried About Running Out of Food in the Last Year: Not on file    Sudhir of Food in the Last Year: Not on file   Transportation Needs:     Lack of Transportation (Medical): Not on file    Lack of Transportation (Non-Medical):  Not on file   Physical Activity:     Days of Exercise per Week: Not on file    Minutes of Exercise per Session: Not on file   Stress:     Feeling of Stress : Not on file   Social Connections:     Frequency of Communication with Friends and Family: Not on file    Frequency of Social Gatherings with Friends and Family: Not on file    Attends [employer] Services: Not on file    Active Member of [address] [place] [address] or Organizations: Not on file    Attends Club or Organization Meetings: Not on file    Marital Status: Not on file   Intimate Partner Violence:     Fear of Current or Ex-Partner: Not on file    Emotionally Abused: Not on file    Physically Abused: Not on file    Sexually Abused: Not on file   Housing Stability:     Unable to Pay for Housing in the Last Year: Not on file    Number of [place] in the Last Year: Not on file    Unstable Housing in the Last Year: Not on file       Current Outpatient Medications   Medication Sig Dispense Refill    valACYclovir (VALTREX) 1 gram tablet TAKE 1 TABLET BY MOUTH TWICE DAILY AS NEEDED 20 Tablet 0    ergocalciferol (ERGOCALCIFEROL) 1,250 mcg (50,000 unit) capsule TAKE 1 CAPSULE BY MOUTH EVERY 7 DAYS. 12 Capsule 0    fluticasone propionate (FLONASE) 50 mcg/actuation nasal spray SHAKE LIQUID AND USE 2 SPRAYS IN EACH NOSTRIL DAILY 48 g 1    albuterol (PROVENTIL HFA, VENTOLIN HFA, PROAIR HFA) 90 mcg/actuation inhaler INHALE 2 PUFFS BY MOUTH EVERY 4 HOURS AS NEEDED FOR WHEEZING 18 g 2    levothyroxine (SYNTHROID) 112 mcg tablet TAKE 1 TABLET BY MOUTH EVERY DAY BEFORE BREAKFAST 90 Tab 0    montelukast (SINGULAIR) 10 mg tablet TAKE 1 TABLET BY MOUTH EVERY DAY 90 Tab 0    ibuprofen (MOTRIN) 600 mg tablet Take 1 Tab by mouth every six (6) hours as needed for Pain.  20 Tab 0    pravastatin (PRAVACHOL) 40 mg tablet TAKE 1 TABLET BY MOUTH EVERY NIGHT 90 Tablet 1    pravastatin (PRAVACHOL) 40 mg tablet TAKE 1 TABLET BY MOUTH EVERY NIGHT (Patient not taking: Reported on 5/27/2021) 30 Tab 0    cyclobenzaprine (FLEXERIL) 10 mg tablet Take 1 Tab by mouth three (3) times daily as needed for Muscle Spasm(s). (Patient not taking: Reported on 5/27/2021) 15 Tab 0    naltrexone-buPROPion (CONTRAVE) 8-90 mg TbER ER tablet Week 1 1 tab PO QAM, Week 2 1QAM 1QHS, Week 3 2QAM 1 QHS, Week 4 & beyond 2QAM 2QHS (Patient not taking: Reported on 5/27/2021) 162 Tab 0    cholecalciferol, VITAMIN D3, (VITAMIN D3) 5,000 unit tab tablet Take  by mouth daily. (Patient not taking: Reported on 5/27/2021)           Vitals:    12/16/21 1426 12/16/21 1513   BP: (!) 127/41 138/70   Pulse: 78    Resp: 16    Temp: 97.8 °F (36.6 °C)    TempSrc: Oral    SpO2: 97%    Weight: 179 lb (81.2 kg)    Height: 5' 6\" (1.676 m)    PainSc:   2        Physical Exam  Vitals and nursing note reviewed. Constitutional:       Appearance: Normal appearance. Cardiovascular:      Rate and Rhythm: Normal rate and regular rhythm. Pulses: Normal pulses. Heart sounds: Normal heart sounds. Pulmonary:      Effort: Pulmonary effort is normal.      Breath sounds: Normal breath sounds. Abdominal:      General: Bowel sounds are normal.      Palpations: Abdomen is soft. Musculoskeletal:      Cervical back: Normal range of motion and neck supple. Skin:     General: Skin is warm and dry. Neurological:      General: No focal deficit present. Mental Status: She is alert.          Hospital Outpatient Visit on 11/29/2021   Component Date Value Ref Range Status    WBC 11/29/2021 6.4  4.6 - 13.2 K/uL Final    RBC 11/29/2021 4.28  4.20 - 5.30 M/uL Final    HGB 11/29/2021 13.6  12.0 - 16.0 g/dL Final    HCT 11/29/2021 41.4  35.0 - 45.0 % Final    MCV 11/29/2021 96.7  78.0 - 100.0 FL Final    MCH 11/29/2021 31.8  24.0 - 34.0 PG Final    MCHC 11/29/2021 32.9  31.0 - 37.0 g/dL Final    RDW 11/29/2021 12.3  11.6 - 14.5 % Final    PLATELET 17/91/5783 751  135 - 420 K/uL Final    MPV 11/29/2021 8.8* 9.2 - 11.8 FL Final    NRBC 11/29/2021 0.0  0  WBC Final    ABSOLUTE NRBC 11/29/2021 0.00  0.00 - 0.01 K/uL Final    NEUTROPHILS 11/29/2021 60  40 - 73 % Final    LYMPHOCYTES 11/29/2021 30  21 - 52 % Final    MONOCYTES 11/29/2021 6  3 - 10 % Final    EOSINOPHILS 11/29/2021 3  0 - 5 % Final    BASOPHILS 11/29/2021 1  0 - 2 % Final    IMMATURE GRANULOCYTES 11/29/2021 0  0.0 - 0.5 % Final    ABS. NEUTROPHILS 11/29/2021 3.9  1.8 - 8.0 K/UL Final    ABS. LYMPHOCYTES 11/29/2021 1.9  0.9 - 3.6 K/UL Final    ABS. MONOCYTES 11/29/2021 0.4  0.05 - 1.2 K/UL Final    ABS. EOSINOPHILS 11/29/2021 0.2  0.0 - 0.4 K/UL Final    ABS. BASOPHILS 11/29/2021 0.1  0.0 - 0.1 K/UL Final    ABS. IMM. GRANS. 11/29/2021 0.0  0.00 - 0.04 K/UL Final    DF 11/29/2021 AUTOMATED    Final    LIPID PROFILE 11/29/2021        Final    Cholesterol, total 11/29/2021 187  <200 MG/DL Final    Triglyceride 11/29/2021 88  <150 MG/DL Final    Comment: The drugs N-acetylcysteine (NAC) and  Metamiszole have been found to cause falsely  low results in this chemical assay. Please  be sure to submit blood samples obtained  BEFORE administration of either of these  drugs to assure correct results.       HDL Cholesterol 11/29/2021 63* 40 - 60 MG/DL Final    LDL, calculated 11/29/2021 106.4* 0 - 100 MG/DL Final    VLDL, calculated 11/29/2021 17.6  MG/DL Final    CHOL/HDL Ratio 11/29/2021 3.0  0 - 5.0   Final    Sodium 11/29/2021 141  136 - 145 mmol/L Final    Potassium 11/29/2021 4.0  3.5 - 5.5 mmol/L Final    Chloride 11/29/2021 109  100 - 111 mmol/L Final    CO2 11/29/2021 29  21 - 32 mmol/L Final    Anion gap 11/29/2021 3  3.0 - 18 mmol/L Final    Glucose 11/29/2021 95  74 - 99 mg/dL Final    BUN 11/29/2021 16  7.0 - 18 MG/DL Final    Creatinine 11/29/2021 0.67  0.6 - 1.3 MG/DL Final    BUN/Creatinine ratio 11/29/2021 24* 12 - 20   Final    GFR est AA 11/29/2021 >60  >60 ml/min/1.73m2 Final    GFR est non-AA 11/29/2021 >60  >60 ml/min/1.73m2 Final    Comment: (NOTE)  Estimated GFR is calculated using the Modification of Diet in Renal   Disease (MDRD) Study equation, reported for both  Americans   (GFRAA) and non- Americans (GFRNA), and normalized to 1.73m2   body surface area. The physician must decide which value applies to   the patient. The MDRD study equation should only be used in   individuals age 25 or older. It has not been validated for the   following: pregnant women, patients with serious comorbid conditions,   or on certain medications, or persons with extremes of body size,   muscle mass, or nutritional status.  Calcium 11/29/2021 9.9  8.5 - 10.1 MG/DL Final    Bilirubin, total 11/29/2021 0.6  0.2 - 1.0 MG/DL Final    ALT (SGPT) 11/29/2021 14  13 - 56 U/L Final    AST (SGOT) 11/29/2021 12  10 - 38 U/L Final    Alk. phosphatase 11/29/2021 59  45 - 117 U/L Final    Protein, total 11/29/2021 6.9  6.4 - 8.2 g/dL Final    Albumin 11/29/2021 3.8  3.4 - 5.0 g/dL Final    Globulin 11/29/2021 3.1  2.0 - 4.0 g/dL Final    A-G Ratio 11/29/2021 1.2  0.8 - 1.7   Final       No results found for any visits on 12/16/21. Patient Care Team:  Patient Care Team:  Erin Nugent NP as PCP - General (Nurse Practitioner)  Erin Nugent NP as PCP - [facility]  Tabitha Sommers, RN as Nurse Navigator  Prince Miner MD (Surgery)      Assessment / Plan:      ICD-10-CM ICD-9-CM    1. Welcome to Medicare preventive visit  Z00.00 V70.0    2. HSV-1 infection  B00.9 054.9 valACYclovir (VALTREX) 1 gram tablet   3. Upper extremity pain, inferior, unspecified laterality  M79.603 729.5 XR HUMERUS RT      REFERRAL TO ORTHOPEDIC SURGERY   4. Lumbosacral pain  M54.50 724.2 REFERRAL TO PHYSICIAL MEDICINE REHAB     724.6    5.  Hypothyroidism, unspecified type  E03.9 244.9 TSH 3RD GENERATION     Colonocopy scheduled for 2/19/202  Continue current treatment plan  Negative for any side effects or adverse reaction to the medication    I asked the patient if she had any questions and answered her  questions. The patient stated that she understands the treatment plan and agrees with the treatment plan    This document was created with a voice activated dictation system and may contain transcription errors.

## 2021-12-18 DIAGNOSIS — E78.5 DYSLIPIDEMIA: ICD-10-CM

## 2021-12-19 RX ORDER — PRAVASTATIN SODIUM 40 MG/1
TABLET ORAL
Qty: 90 TABLET | Refills: 1 | Status: SHIPPED | OUTPATIENT
Start: 2021-12-19 | End: 2022-03-08 | Stop reason: SDUPTHER

## 2022-02-01 ENCOUNTER — OFFICE VISIT (OUTPATIENT)
Dept: ORTHOPEDIC SURGERY | Age: 67
End: 2022-02-01
Payer: MEDICARE

## 2022-02-01 VITALS
HEIGHT: 66 IN | HEART RATE: 63 BPM | OXYGEN SATURATION: 96 % | SYSTOLIC BLOOD PRESSURE: 134 MMHG | RESPIRATION RATE: 16 BRPM | TEMPERATURE: 96.5 F | DIASTOLIC BLOOD PRESSURE: 51 MMHG | WEIGHT: 174 LBS | BODY MASS INDEX: 27.97 KG/M2

## 2022-02-01 DIAGNOSIS — M54.2 CERVICAL PAIN: ICD-10-CM

## 2022-02-01 DIAGNOSIS — M53.3 PAIN OF RIGHT SACROILIAC JOINT: ICD-10-CM

## 2022-02-01 DIAGNOSIS — Z72.0 TOBACCO USE: ICD-10-CM

## 2022-02-01 DIAGNOSIS — M51.36 DDD (DEGENERATIVE DISC DISEASE), LUMBAR: ICD-10-CM

## 2022-02-01 DIAGNOSIS — M54.50 LUMBAR PAIN: ICD-10-CM

## 2022-02-01 DIAGNOSIS — Z86.16 HISTORY OF COVID-19: ICD-10-CM

## 2022-02-01 DIAGNOSIS — M50.30 DDD (DEGENERATIVE DISC DISEASE), CERVICAL: ICD-10-CM

## 2022-02-01 DIAGNOSIS — M62.838 MUSCLE SPASM: ICD-10-CM

## 2022-02-01 DIAGNOSIS — M54.50 LUMBAR PAIN: Primary | ICD-10-CM

## 2022-02-01 DIAGNOSIS — M47.816 LUMBAR FACET ARTHROPATHY: ICD-10-CM

## 2022-02-01 DIAGNOSIS — I70.90 ATHEROSCLEROSIS: ICD-10-CM

## 2022-02-01 PROCEDURE — G8427 DOCREV CUR MEDS BY ELIG CLIN: HCPCS | Performed by: PHYSICAL MEDICINE & REHABILITATION

## 2022-02-01 PROCEDURE — 99204 OFFICE O/P NEW MOD 45 MIN: CPT | Performed by: PHYSICAL MEDICINE & REHABILITATION

## 2022-02-01 PROCEDURE — G8399 PT W/DXA RESULTS DOCUMENT: HCPCS | Performed by: PHYSICAL MEDICINE & REHABILITATION

## 2022-02-01 PROCEDURE — G8419 CALC BMI OUT NRM PARAM NOF/U: HCPCS | Performed by: PHYSICAL MEDICINE & REHABILITATION

## 2022-02-01 PROCEDURE — G9899 SCRN MAM PERF RSLTS DOC: HCPCS | Performed by: PHYSICAL MEDICINE & REHABILITATION

## 2022-02-01 PROCEDURE — 72110 X-RAY EXAM L-2 SPINE 4/>VWS: CPT | Performed by: PHYSICAL MEDICINE & REHABILITATION

## 2022-02-01 PROCEDURE — 3017F COLORECTAL CA SCREEN DOC REV: CPT | Performed by: PHYSICAL MEDICINE & REHABILITATION

## 2022-02-01 PROCEDURE — G8536 NO DOC ELDER MAL SCRN: HCPCS | Performed by: PHYSICAL MEDICINE & REHABILITATION

## 2022-02-01 PROCEDURE — 1090F PRES/ABSN URINE INCON ASSESS: CPT | Performed by: PHYSICAL MEDICINE & REHABILITATION

## 2022-02-01 PROCEDURE — 1101F PT FALLS ASSESS-DOCD LE1/YR: CPT | Performed by: PHYSICAL MEDICINE & REHABILITATION

## 2022-02-01 PROCEDURE — G8432 DEP SCR NOT DOC, RNG: HCPCS | Performed by: PHYSICAL MEDICINE & REHABILITATION

## 2022-02-01 PROCEDURE — 72040 X-RAY EXAM NECK SPINE 2-3 VW: CPT | Performed by: PHYSICAL MEDICINE & REHABILITATION

## 2022-02-01 RX ORDER — METHOCARBAMOL 500 MG/1
TABLET, FILM COATED ORAL
Qty: 60 TABLET | Refills: 1 | Status: SHIPPED | OUTPATIENT
Start: 2022-02-01

## 2022-02-01 RX ORDER — METHYLPREDNISOLONE 4 MG/1
TABLET ORAL
Qty: 1 DOSE PACK | Refills: 0 | Status: SHIPPED | OUTPATIENT
Start: 2022-02-01 | End: 2022-03-08 | Stop reason: ALTCHOICE

## 2022-02-01 NOTE — PATIENT INSTRUCTIONS
Low Back Arthritis: Exercises  Introduction  Here are some examples of typical rehabilitation exercises for your condition. Start each exercise slowly. Ease off the exercise if you start to have pain. Your doctor or physical therapist will tell you when you can start these exercises and which ones will work best for you. When you are not being active, find a comfortable position for rest. Some people are comfortable on the floor or a medium-firm bed with a small pillow under their head and another under their knees. Some people prefer to lie on their side with a pillow between their knees. Don't stay in one position for too long. Take short walks (10 to 20 minutes) every 2 to 3 hours. Avoid slopes, hills, and stairs until you feel better. Walk only distances you can manage without pain, especially leg pain. How to do the exercises  Pelvic tilt    1. Lie on your back with your knees bent. 2. \"Brace\" your stomachtighten your muscles by pulling in and imagining your belly button moving toward your spine. 3. Press your lower back into the floor. You should feel your hips and pelvis rock back. 4. Hold for 6 seconds while breathing smoothly. 5. Relax and allow your pelvis and hips to rock forward. 6. Repeat 8 to 12 times. Back stretches    1. Get down on your hands and knees on the floor. 2. Relax your head and allow it to droop. Round your back up toward the ceiling until you feel a nice stretch in your upper, middle, and lower back. Hold this stretch for as long as it feels comfortable, or about 15 to 30 seconds. 3. Return to the starting position with a flat back while you are on your hands and knees. 4. Let your back sway by pressing your stomach toward the floor. Lift your buttocks toward the ceiling. 5. Hold this position for 15 to 30 seconds. 6. Repeat 2 to 4 times. Follow-up care is a key part of your treatment and safety.  Be sure to make and go to all appointments, and call your doctor if you are having problems. It's also a good idea to know your test results and keep a list of the medicines you take. Where can you learn more? Go to http://michael-jessi.info/  Enter T094 in the search box to learn more about \"Low Back Arthritis: Exercises. \"  Current as of: July 1, 2021               Content Version: 13.0  © 2006-2021 [email or web address]. Care instructions adapted under license by [employer] (which disclaims liability or warranty for this information). If you have questions about a medical condition or this instruction, always ask your healthcare professional. [place] [address] any warranty or liability for your use of this information. Sacroiliac Pain: Exercises  Introduction  Here are some examples of exercises for you to try. The exercises may be suggested for a condition or for rehabilitation. Start each exercise slowly. Ease off the exercises if you start to have pain. You will be told when to start these exercises and which ones will work best for you. How to do the exercises  Knee-to-chest stretch    1. Do not do the knee-to-chest exercise if it causes or increases back or leg pain. 2. Lie on your back with your knees bent and your feet flat on the floor. You can put a small pillow under your head and neck if it is more comfortable. 3. Grasp your hands under one knee and bring the knee to your chest, keeping the other foot flat on the floor. 4. Keep your lower back pressed to the floor. Hold for at least 15 to 30 seconds. 5. Relax and lower the knee to the starting position. Repeat with the other leg. 6. Repeat 2 to 4 times with each leg. 7. To get more stretch, keep your other leg flat on the floor while pulling your knee to your chest.  Bridging    1. Lie on your back with both knees bent. Your knees should be bent about 90 degrees. 2. Tighten your belly muscles by pulling in your belly button toward your spine.  Then push your feet into the floor, squeeze your buttocks, and lift your hips off the floor until your shoulders, hips, and knees are all in a straight line. 3. Hold for about 6 seconds as you continue to breathe normally, and then slowly lower your hips back down to the floor and rest for up to 10 seconds. 4. Repeat 8 to 12 times. Hip extension    1. Get down on your hands and knees on the floor. 2. Keeping your back and neck straight, lift one leg straight out behind you. When you lift your leg, keep your hips level. Don't let your back twist, and don't let your hip drop toward the floor. 3. Hold for 6 seconds. Repeat 8 to 12 times with each leg. 4. If you feel steady and strong when you do this exercise, you can make it more difficult. To do this, when you lift your leg, also lift the opposite arm straight out in front of you. For example, lift the left leg and the right arm at the same time. (This is sometimes called the \"bird dog exercise. \") Hold for 6 seconds, and repeat 8 to 12 times on each side. Clamshell    1. Lie on your side with a pillow under your head. Keep your feet and knees together and your knees bent. 2. Raise your top knee, but keep your feet together. Do not let your hips roll back. Your legs should open up like a clamshell. 3. Hold for 6 seconds. 4. Slowly lower your knee back down. Rest for 10 seconds. 5. Repeat 8 to 12 times. 6. Switch to your other side and repeat steps 1 through 5. Hamstring wall stretch    1. Lie on your back in a doorway, with one leg through the open door. 2. Slide your affected leg up the wall to straighten your knee. You should feel a gentle stretch down the back of your leg. 3. Hold the stretch for at least 1 minute to begin. Then try to lengthen the time you hold the stretch to as long as 6 minutes. 4. Switch legs, and repeat steps 1 through 3.  5. Repeat 2 to 4 times.   6. If you do not have a place to do this exercise in a doorway, there is another way to do it:  7. Lie on your back, and bend one knee. 8. Loop a towel under the ball and toes of that foot, and hold the ends of the towel in your hands. 9. Straighten your knee, and slowly pull back on the towel. You should feel a gentle stretch down the back of your leg. 10. Switch legs, and repeat steps 1 through 3.  11. Repeat 2 to 4 times. 1. Do not arch your back. 2. Do not bend either knee. 3. Keep one heel touching the floor and the other heel touching the wall. Do not point your toes. Lower abdominal strengthening    1. Lie on your back with your knees bent and your feet flat on the floor. 2. Tighten your belly muscles by pulling your belly button in toward your spine. 3. Lift one foot off the floor and bring your knee toward your chest, so that your knee is straight above your hip and your leg is bent like the letter \"L. \"  4. Lift the other knee up to the same position. 5. Lower one leg at a time to the starting position. 6. Keep alternating legs until you have lifted each leg 8 to 12 times. 7. Be sure to keep your belly muscles tight and your back still as you are moving your legs. Be sure to breathe normally. Piriformis stretch    1. Lie on your back with your legs straight. 2. Lift your affected leg, and bend your knee. With your opposite hand, reach across your body, and then gently pull your knee toward your opposite shoulder. 3. Hold the stretch for 15 to 30 seconds. 4. Switch legs and repeat steps 1 through 3.  5. Repeat 2 to 4 times. Follow-up care is a key part of your treatment and safety. Be sure to make and go to all appointments, and call your doctor if you are having problems. It's also a good idea to know your test results and keep a list of the medicines you take. Where can you learn more? Go to http://www.gray.[employer]/  Enter U295 in the search box to learn more about \"Sacroiliac Pain: Exercises. \"  Current as of: July 1, 2021               Content Version: 13.0  © 0732-9383 [employer]. Care instructions adapted under license by [employer] (which disclaims liability or warranty for this information). If you have questions about a medical condition or this instruction, always ask your healthcare professional. [address] any warranty or liability for your use of this information. Neck Arthritis: Exercises  Introduction  Here are some examples of exercises for you to try. The exercises may be suggested for a condition or for rehabilitation. Start each exercise slowly. Ease off the exercises if you start to have pain. You will be told when to start these exercises and which ones will work best for you. How to do the exercises  Neck stretches to the side    8. This stretch works best if you keep your shoulder down as you lean away from it. To help you remember to do this, start by relaxing your shoulders and lightly holding on to your thighs or your chair. 9. Tilt your head toward your shoulder and hold for 15 to 30 seconds. Let the weight of your head stretch your muscles. 10. Repeat 2 to 4 times toward each shoulder. Chin tuck    5. Lie on the floor with a rolled-up towel under your neck. Your head should be touching the floor. 6. Slowly bring your chin toward your chest.  7. Hold for a count of 6, and then relax for up to 10 seconds. 8. Repeat 8 to 12 times. Active cervical rotation    5. Sit in a firm chair, or stand up straight. 6. Keeping your chin level, turn your head to the right, and hold for 15 to 30 seconds. 7. Turn your head to the left and hold for 15 to 30 seconds. 8. Repeat 2 to 4 times to each side. Shoulder blade squeeze    7. While standing, squeeze your shoulder blades together. 8. Do not raise your shoulders up as you are squeezing. 9. Hold for 6 seconds. 10. Repeat 8 to 12 times. Shoulder rolls    12. Sit comfortably with your feet shoulder-width apart.  You can also do this exercise standing up. 13. Roll your shoulders up, then back, and then down in a smooth, circular motion. 14. Repeat 2 to 4 times. Follow-up care is a key part of your treatment and safety. Be sure to make and go to all appointments, and call your doctor if you are having problems. It's also a good idea to know your test results and keep a list of the medicines you take. Where can you learn more? Go to http://www.gray.com/  Enter C677 in the search box to learn more about \"Neck Arthritis: Exercises. \"  Current as of: July 1, 2021               Content Version: 13.0  © 6885-6417 [employer]. Care instructions adapted under license by [employer] (which disclaims liability or warranty for this information). If you have questions about a medical condition or this instruction, always ask your healthcare professional. [address] any warranty or liability for your use of this information.

## 2022-02-01 NOTE — PROGRESS NOTES
[facility]  [address] [place], [address]  Phone: [phone]  Fax: [phone]    NEW PATIENT  Pt's YOB: 1955    ASSESSMENT   Diagnoses and all orders for this visit:    1. Lumbar pain  -     AMB POC XRAY, SPINE, LUMBOSACRAL; 4+  -     MRI LUMB SPINE WO CONT; Future  -     REFERRAL TO PHYSICAL THERAPY  -     methylPREDNISolone (MEDROL DOSEPACK) 4 mg tablet; Per dose pack instructions    2. Cervical pain  -     AMB POC XRAY, SPINE, CERVICAL; 2 OR 3  -     REFERRAL TO PHYSICAL THERAPY  -     methylPREDNISolone (MEDROL DOSEPACK) 4 mg tablet; Per dose pack instructions    3. Pain of right sacroiliac joint  -     MRI LUMB SPINE WO CONT; Future    4. Lumbar facet arthropathy  -     MRI LUMB SPINE WO CONT; Future  -     REFERRAL TO PHYSICAL THERAPY    5. Muscle spasm  -     MRI LUMB SPINE WO CONT; Future  -     REFERRAL TO PHYSICAL THERAPY  -     methocarbamoL (ROBAXIN) 500 mg tablet; Take 1 tab by mouth BID-TID as needed for muscle spasm. 6. DDD (degenerative disc disease), cervical  -     REFERRAL TO PHYSICAL THERAPY    7. DDD (degenerative disc disease), lumbar  -     MRI LUMB SPINE WO CONT; Future  -     REFERRAL TO PHYSICAL THERAPY    8. Tobacco use    9. Atherosclerosis    10. History of COVID-19         IMPRESSION AND PLAN:  Sergio Marie is a 73 y.o. female with history of lumbar pain x 4 months. Pt complains of intermittent right-sided lower lumbar pain with bending x 4 months, right arm pain, and occasional numbness and cramping in the right toes. She denies any pain radiating down the legs at this time and states that her pain is alleviated with walking. Pt denies any difficulty turning over in bed but cannot lie on her right side due to her arm pain. She is currently taking vitamin D 50,000 international units weekly. 1) Pt was given information on cervical and lumbar arthritis and sacroiliac exercises.    2) Cervical 2V and lumbar 4V x-rays were obtained at today's office visit and reviewed with the patient. 3) Discussed treatment options with the patient including physical therapy, medications, and advanced imaging. 4) Pt was referred to physical therapy. 5) A lumbar MRI was ordered to assess for stenosis and inflammation. 6) Pt was prescribed a Medrol Dosepak. 7) Pt was advised to discuss vitamin D3 and zinc supplementation with her PCP. 8) Ms. Marie has a reminder for a \"due or due soon\" health maintenance. I have asked that she contact her primary care provider, Brayden Kumar NP, for follow-up on this health maintenance. 9)  demonstrated consistency with prescribing. 10) Robaxin was also prescribed for muscle spasm  11) Smoking cessation discussed / encouraged and she plans to stop on her birthhday  Follow-up and Dispositions    · Return in about 5 weeks (around 3/8/2022) for PT follow up, Diagnostic Test follow up, Medication follow up. HISTORY OF PRESENT ILLNESS:  Cesar Marie is a 73 y.o. female with history of lumbar pain x 4 months. Pt presents to the office today as a new patient referred by Brayden Kumar NP. Pt complains of intermittent right-sided lower lumbar pain with bending x 4 months, right arm pain, and occasional numbness and cramping in the right toes. She denies any pain radiating down the legs at this time and states that her pain is alleviated with walking. Pt denies any difficulty turning over in bed but cannot lie on her right side due to her arm pain. Pt is taking prescription vitamin D 50,000 units weekly. She notes she was previously prescribed muscle relaxants by her PCP but never filled the prescription. Pt states she tolerates oral steroids and has no history of GI sensitivity to medication. She confirms an allergy to sulfa antibiotics, noting symptoms of nausea and hives.  Pt notes she has previously followed up with Dr. Grace Fried in 2010 and was diagnosed with a \"bone spur\" surrounding a nerve but has not undergone spinal surgery. Pt also notes a history of knee inflammation and effusion treated successfully with steroid injections by Dr. Edgar Hayward. She denies any history of hip or knee replacements. Labs from 12/01/2020 were reviewed and demonstrated a 25-OH-vitamin D level of 48.5 ng/dL, increased from 28.3 ng/dL on 10/30/2019. Pt at this time desires to proceed with a lumbar MRI and physical therapy. Of note, pt is a light smoker but is planning to quit on 02/05/2022. She also states that she contracted COVID-19 and the flu concurrently in late 12/2021 with mild symptoms. Pain Scale: 8/10     PCP: Bijal Royal NP    Past Medical History:   Diagnosis Date    Asthma     childhood diagnosis; occasional inhaler use    Chest pain     Diverticulitis     Dyslipidemia     \"borderline\" per pt.  Endometriosis     GERD (gastroesophageal reflux disease)     Hypothyroidism     Normal nuclear stress test 09/14/2011    No ischemia or infarct. EF 75%. No WMA. Neg max EST w/nondiagnostic EKG changes and atypical chest discomfort at peak exercise.     Restless leg syndrome     Thyroid disease     Tobacco use disorder     ongoing        Social History     Socioeconomic History    Marital status:      Spouse name: Not on file    Number of children: Not on file    Years of education: Not on file    Highest education level: Not on file   Occupational History    Not on file   Tobacco Use    Smoking status: Current Every Day Smoker     Packs/day: 0.25     Years: 25.00     Pack years: 6.25    Smokeless tobacco: Never Used   Substance and Sexual Activity    Alcohol use: Yes     Comment: social    Drug use: No    Sexual activity: Yes     Partners: Male     Birth control/protection: None   Other Topics Concern    Not on file   Social History Narrative    Not on file     Social Determinants of Health     Financial Resource Strain:     Difficulty of Paying Living Expenses: Not on file   Food Insecurity:     Worried About Running Out of Food in the Last Year: Not on file    Ran Out of Food in the Last Year: Not on file   Transportation Needs:     Lack of Transportation (Medical): Not on file    Lack of Transportation (Non-Medical): Not on file   Physical Activity:     Days of Exercise per Week: Not on file    Minutes of Exercise per Session: Not on file   Stress:     Feeling of Stress : Not on file   Social Connections:     Frequency of Communication with Friends and Family: Not on file    Frequency of Social Gatherings with Friends and Family: Not on file    Attends Voodoo Services: Not on file    Active Member of [address] [place] [address] [employer] or Organizations: Not on file    Attends Club or Organization Meetings: Not on file    Marital Status: Not on file   Intimate Partner Violence:     Fear of Current or Ex-Partner: Not on file    Emotionally Abused: Not on file    Physically Abused: Not on file    Sexually Abused: Not on file   Housing Stability:     Unable to Pay for Housing in the Last Year: Not on file    Number of [place] in the Last Year: Not on file    Unstable Housing in the Last Year: Not on file       Current Outpatient Medications   Medication Sig Dispense Refill    methylPREDNISolone (MEDROL DOSEPACK) 4 mg tablet Per dose pack instructions 1 Dose Pack 0    methocarbamoL (ROBAXIN) 500 mg tablet Take 1 tab by mouth BID-TID as needed for muscle spasm. 60 Tablet 1    pravastatin (PRAVACHOL) 40 mg tablet TAKE 1 TABLET BY MOUTH EVERY NIGHT 90 Tablet 1    valACYclovir (VALTREX) 1 gram tablet TAKE 1 TABLET BY MOUTH TWICE DAILY AS NEEDED 20 Tablet 0    ergocalciferol (ERGOCALCIFEROL) 1,250 mcg (50,000 unit) capsule TAKE 1 CAPSULE BY MOUTH EVERY 7 DAYS.  12 Capsule 0    fluticasone propionate (FLONASE) 50 mcg/actuation nasal spray SHAKE LIQUID AND USE 2 SPRAYS IN EACH NOSTRIL DAILY 48 g 1    albuterol (PROVENTIL HFA, VENTOLIN HFA, PROAIR HFA) 90 mcg/actuation inhaler INHALE 2 PUFFS BY MOUTH EVERY 4 HOURS AS NEEDED FOR WHEEZING 18 g 2    levothyroxine (SYNTHROID) 112 mcg tablet TAKE 1 TABLET BY MOUTH EVERY DAY BEFORE BREAKFAST 90 Tab 0    montelukast (SINGULAIR) 10 mg tablet TAKE 1 TABLET BY MOUTH EVERY DAY 90 Tab 0    pravastatin (PRAVACHOL) 40 mg tablet TAKE 1 TABLET BY MOUTH EVERY NIGHT 30 Tab 0    cholecalciferol, VITAMIN D3, (VITAMIN D3) 5,000 unit tab tablet Take  by mouth daily.  ibuprofen (MOTRIN) 600 mg tablet Take 1 Tab by mouth every six (6) hours as needed for Pain. (Patient not taking: Reported on 2/1/2022) 20 Tab 0    cyclobenzaprine (FLEXERIL) 10 mg tablet Take 1 Tab by mouth three (3) times daily as needed for Muscle Spasm(s). (Patient not taking: Reported on 2/1/2022) 15 Tab 0    naltrexone-buPROPion (CONTRAVE) 8-90 mg TbER ER tablet Week 1 1 tab PO QAM, Week 2 1QAM 1QHS, Week 3 2QAM 1 QHS, Week 4 & beyond 2QAM 2QHS (Patient not taking: Reported on 2/1/2022) 162 Tab 0       Allergies   Allergen Reactions    Atorvastatin Other (comments)     Leg cramps and swelling    Sulfa (Sulfonamide Antibiotics) Hives       REVIEW OF SYSTEMS    Constitutional: Negative for fever, chills, or weight change. Respiratory: Negative for cough or shortness of breath. Cardiovascular: Negative for chest pain or palpitations. Gastrointestinal: Negative for acid reflux, change in bowel habits, or constipation. Genitourinary: Negative for dysuria and flank pain. Musculoskeletal: Positive for right lumbar and right arm pain. Skin: Negative for rash. Neurological: Negative for headaches and dizziness. Positive for right foot numbness. Endo/Heme/Allergies: Negative for increased bruising. Psychiatric/Behavioral: Positive for difficulty with sleep.   As per HPI    PHYSICAL EXAMINATION  Visit Vitals  BP (!) 134/51 (BP 1 Location: Right arm, BP Patient Position: Sitting, BP Cuff Size: Adult)   Pulse 63   Temp (!) 96.5 °F (35.8 °C) (Tympanic) Resp 16   Ht 5' 6\" (1.676 m)   Wt 174 lb (78.9 kg)   SpO2 96%   BMI 28.08 kg/m²       Constitutional: Awake, alert, and in no acute distress. HEENT: Normocephalic. Atraumatic. Oropharynx is moist and clear. PERRL. EOMI. Sclerae are nonicteric  Cardiovascular: Regular rate and rhythm. No carotid bruits bilaterally. Lungs: Clear to auscultation bilaterally  Abdomen: Soft and nontender. Bowel sounds are present  Neurological: 1+ symmetrical DTRs in the upper extremities. 1+ symmetrical DTRs in the lower extremities. Sensation to light touch is intact. Negative Gordon's sign bilaterally. Skin: warm, dry, and intact. Musculoskeletal: Good range of motion in the cervical spine on all planes. Good range of motion in the bilateral shoulders. Pain with passive ranging of the right shoulder. Tightness across the upper trapezius bilaterally with trigger points on the right. No pain with extension, axial loading, or forward flexion. No pain with internal or external rotation of her hips. Negative straight leg raise bilaterally. Tenderness to palpation over the right lower lumbar region. No pain with heel or toe walking. No difficulty with the single leg stance bilaterally. Limited range of motion with BREANN test, R>L. Tenderness to palpation over the right sacroiliac joint. Biceps  Triceps Deltoids Wrist Ext Wrist Flex Hand Intrin   Right +4/5 +4/5 +4/5 +4/5 +4/5 +4/5   Left +4/5 +4/5 +4/5 +4/5 +4/5 +4/5      Hip Flex  Quads Hamstrings Ankle DF EHL Ankle PF   Right +4/5 +4/5 +4/5 +4/5 +4/5 +4/5   Left +4/5 +4/5 +4/5 +4/5 +4/5 +4/5     IMAGING:    Cervical spine 2V x-rays from 02/01/2022 were reviewed and demonstrated:     Atherosclerosis bilaterally. Mild straightening of the cervical spine. Multilevel degenerative facet. Bridging osteophytes C5-6. Severe degenerative disc C5-6, C6-7. Lumbar spine 4V x-rays from 02/01/2022 were reviewed and demonstrated:     Extensive atherosclerosis.  Multilevel degenerative facet. Severe degenerative disc L5-S1. No instability. Abdominal/pelvic CT from 11/28/2017 was personally reviewed with the patient and demonstrated:    FINDINGS:  The visualized portions of the lung bases are clear.     Multiple hepatic low-attenuation lesions are noted. Most have increased in size  since previous exam and some are too small to characterize but the largest are  cystic in nature. .     There are no focal abnormalities within the spleen, pancreas, adrenal glands or  kidneys.     The aorta tapers without aneurysm. There is no retroperitoneal adenopathy or  mass.     The bowel demonstrates colonic diverticulosis without definite evidence of  diverticulitis. There is some thickening of the sigmoid which is presumably from  underdistention. Correlate clinically. . The appendix is nonvisualized. No  suspect inflammation in the right lower quadrant. . There is no ascites or free  intraperitoneal air. Small fat-containing umbilical hernia and small  fat-containing left inguinal hernia. Atherosclerosis is noted.     The uterus is absent. Ovaries unremarkable. .   There is no pelvic mass or  adenopathy.     Bladder is underdistended which limits evaluation.     Osseous degenerative changes present. Chronic calcific tendinopathy of the  hamstrings noted bilaterally.     IMPRESSION  IMPRESSION:      No suspect finding noted within the right lower quadrant. Cloteal Burrell Appendix is not  visualized.     Interval increase in size in multiple cystic lesions within the liver.     Diverticulosis. Mild thickening of the sigmoid is presently due to under  distention. Clinical correlation recommended.     Lumbar spine MRI from 07/06/2010 was personally reviewed with the patient and demonstrated:     FINDINGS:  Intact lordosis with normal vertebral body heights.  Mild   discogenic endplate edema on the right at L3-4.  Mild disc space   narrowing and disc desiccation of L5-S1.  Mild disc narrowing at   L3-4.  Normal appearing conus at L1.     Axial imaging correlation:   L1-2: No significant findings.     L2-3: Mild facet arthropathy.  Patent canal and foramina. L3-4: More prominent facet arthropathy.  Small disc extrusion within   right foramen.  Mild canal stenosis.  No nerve impingement. L4-5: Minimal disc bulging at the corners.  Bilateral facet   arthropathy.  No significant canal stenosis.  Patent foramina. L5-S1: Minimal disc osteophyte ridge.  Bilateral facet arthropathy.     No significant canal stenosis.  Mild foraminal stenoses, left more   than right, without nerve impingement. Incidental imaging of regional soft tissues is unremarkable. IMPRESSION:   1. Mild lumbar disc and facet pathology. Sherri Clement is some active   inflammation on the right at   L3-4 with a small disc extrusion.  No significant canal stenosis,   and no nerve impingement.  Details as above. Thank you for this referral.        Written by Deena Ferguson, as dictated by Kia Sanchez MD.  I, Dr. Kia Sanchez confirm that all documentation is accurate.

## 2022-02-01 NOTE — PROGRESS NOTES
Chief Complaint   Patient presents with    Back Pain       Pt preferred language for health care discussion is english. Is someone accompanying this pt? no    Is the patient using any DME equipment during OV? no    Depression Screening:  3 most recent [facility] Screens 12/16/2021 5/27/2021 11/24/2020 9/25/2019 2/7/2019 12/3/2018 3/5/2018   Little interest or pleasure in doing things Not at all Not at all Not at all Not at all Not at all Not at all Not at all   Feeling down, depressed, irritable, or hopeless Not at all Not at all Not at all Not at all Not at all Not at all Not at all   Total Score PHQ 2 0 0 0 0 0 0 0       Learning Assessment:  Learning Assessment 5/18/2018 11/10/2014 11/3/2014   PRIMARY LEARNER Patient Patient Patient   BARRIERS PRIMARY LEARNER NONE - -   [facility]   LEARNER PREFERENCE PRIMARY READING DEMONSTRATION READING     LISTENING READING -     - LISTENING -   ANSWERED BY self - patient    RELATIONSHIP SELF SELF SELF       Abuse Screening:  Abuse Screening Questionnaire 12/16/2021 11/24/2020   Do you ever feel afraid of your partner? N N   Are you in a relationship with someone who physically or mentally threatens you? N N   Is it safe for you to go home? Y Y       Fall Risk  Fall Risk Assessment, last 12 mths 12/16/2021   Able to walk? Yes   Fall in past 12 months? 1   Do you feel unsteady? 0   Are you worried about falling 0   Is TUG test greater than 12 seconds? 0   Is the gait abnormal? 0   Number of falls in past 12 months 1   Fall with injury? 0           Advance Directive:  1. Do you have an advance directive in place? Patient Reply:no    2. If not, would you like material regarding how to put one in place? Patient Reply: no    2. Per patient no changes to their ACP contact no. Coordination of Care:  1. Have you been to the ER, urgent care clinic since your last visit? Hospitalized since your last visit? no    2.  Have you seen or consulted any other health care providers outside of the [address] [place] [address] since your last visit? Include any pap smears or colon screening.  no

## 2022-02-14 ENCOUNTER — HOSPITAL ENCOUNTER (OUTPATIENT)
Age: 67
Discharge: HOME | End: 2022-02-14
Attending: PHYSICAL MEDICINE & REHABILITATION
Payer: MEDICARE

## 2022-02-14 PROCEDURE — 72148 MRI LUMBAR SPINE W/O DYE: CPT

## 2022-02-25 DIAGNOSIS — E55.9 VITAMIN D DEFICIENCY: ICD-10-CM

## 2022-02-27 RX ORDER — ERGOCALCIFEROL 1.25 MG/1
CAPSULE ORAL
Qty: 12 CAPSULE | Refills: 0 | Status: SHIPPED | OUTPATIENT
Start: 2022-02-27

## 2022-03-08 ENCOUNTER — OFFICE VISIT (OUTPATIENT)
Dept: ORTHOPEDIC SURGERY | Age: 67
End: 2022-03-08
Payer: MEDICARE

## 2022-03-08 VITALS
OXYGEN SATURATION: 97 % | TEMPERATURE: 97.5 F | BODY MASS INDEX: 27.8 KG/M2 | HEART RATE: 58 BPM | HEIGHT: 66 IN | WEIGHT: 173 LBS

## 2022-03-08 DIAGNOSIS — Z86.16 HISTORY OF COVID-19: ICD-10-CM

## 2022-03-08 DIAGNOSIS — M54.16 LUMBAR RADICULITIS: Primary | ICD-10-CM

## 2022-03-08 DIAGNOSIS — M62.838 MUSCLE SPASM: ICD-10-CM

## 2022-03-08 DIAGNOSIS — M48.062 SPINAL STENOSIS OF LUMBAR REGION WITH NEUROGENIC CLAUDICATION: ICD-10-CM

## 2022-03-08 DIAGNOSIS — M47.816 LUMBAR FACET ARTHROPATHY: ICD-10-CM

## 2022-03-08 PROCEDURE — G9899 SCRN MAM PERF RSLTS DOC: HCPCS | Performed by: PHYSICAL MEDICINE & REHABILITATION

## 2022-03-08 PROCEDURE — G8432 DEP SCR NOT DOC, RNG: HCPCS | Performed by: PHYSICAL MEDICINE & REHABILITATION

## 2022-03-08 PROCEDURE — 99214 OFFICE O/P EST MOD 30 MIN: CPT | Performed by: PHYSICAL MEDICINE & REHABILITATION

## 2022-03-08 PROCEDURE — 1101F PT FALLS ASSESS-DOCD LE1/YR: CPT | Performed by: PHYSICAL MEDICINE & REHABILITATION

## 2022-03-08 PROCEDURE — G8536 NO DOC ELDER MAL SCRN: HCPCS | Performed by: PHYSICAL MEDICINE & REHABILITATION

## 2022-03-08 PROCEDURE — 1090F PRES/ABSN URINE INCON ASSESS: CPT | Performed by: PHYSICAL MEDICINE & REHABILITATION

## 2022-03-08 PROCEDURE — G8427 DOCREV CUR MEDS BY ELIG CLIN: HCPCS | Performed by: PHYSICAL MEDICINE & REHABILITATION

## 2022-03-08 PROCEDURE — G8419 CALC BMI OUT NRM PARAM NOF/U: HCPCS | Performed by: PHYSICAL MEDICINE & REHABILITATION

## 2022-03-08 PROCEDURE — G8399 PT W/DXA RESULTS DOCUMENT: HCPCS | Performed by: PHYSICAL MEDICINE & REHABILITATION

## 2022-03-08 PROCEDURE — 3017F COLORECTAL CA SCREEN DOC REV: CPT | Performed by: PHYSICAL MEDICINE & REHABILITATION

## 2022-03-08 RX ORDER — PREGABALIN 75 MG/1
CAPSULE ORAL
Qty: 90 CAPSULE | Refills: 1 | Status: SHIPPED | OUTPATIENT
Start: 2022-03-08

## 2022-03-08 NOTE — PROGRESS NOTES
[facility]  [address], [facility] [address]  Phone: [phone]  Fax: [phone]    Pt's YOB: 1955    ASSESSMENT   Diagnoses and all orders for this visit:    1. Lumbar radiculitis  -     REFERRAL TO SPINE SURGERY  -     pregabalin (Lyrica) 75 mg capsule; Take 1 cap by mouth in the morning and 2 at night as directed. 2. Spinal stenosis of lumbar region with neurogenic claudication  -     REFERRAL TO SPINE SURGERY  -     pregabalin (Lyrica) 75 mg capsule; Take 1 cap by mouth in the morning and 2 at night as directed. 3. Lumbar facet arthropathy    4. Muscle spasm    5. History of COVID-19         IMPRESSION AND PLAN:  Anabel Metzger is a 78 y.o. female with history of lumbar pain. She complains of intermittent focal pain in the right buttock and sacral area alleviated with standing, bending forward, and ambulation, intermittent pins-and-needles paresthesias in the right leg, and occasional mild right leg weakness. Pt has not yet taken the Robaxin 500 mg prescribed at her last office visit. 1) Pt was given information on cervical and lumbar arthritis exercises. 2) Discussed treatment options with the patient including medication, steroid injections, and surgical intervention. 3) Pt was referred to Dr. Jessica Joe for surgical evaluation-- Pt declines injections or other interventions. 4) Pt was prescribed Lyrica 75 mg 1 cap QAM and 2 caps QHS, tapering up as directed. 5) Ms. Faviola Duckworth has a reminder for a \"due or due soon\" health maintenance. I have asked that she contact her primary care provider, Evelyn Fermin NP, for follow-up on this health maintenance. 6)  demonstrated consistency with prescribing. Follow-up and Dispositions    · Return if symptoms worsen or fail to improve.          HISTORY OF PRESENT ILLNESS:  Maru Marie is a 78 y.o. female with history of lumbar pain and presents to the office today for physical therapy, medication, and MRI follow up. Pt complains of intermittent focal pain in the right buttock and sacral area alleviated with standing, bending forward, and ambulation, intermittent pins-and-needles paresthesias in the right leg, and occasional mild right leg weakness. She denies any inciting injury for her pain and any pain radiating down the legs at this time. Pt has not yet taken the Robaxin 500 mg prescribed at her last office visit. She is not interested in taking Neurontin due to testimonials of its lack of benefit. Pt states that she took the Medrol Dosepak previously prescribed. She also confirms that she does not like taking medications. Pt reports following up with Dr. Lindaann Barthel years ago and being told she had a bone spur surrounding a nerve. She states she was not interested in surgical intervention at that time. Pt mentions receiving bilateral knee intraarticular injections 7 years ago with permanent relief of her knee pain since. She is also not interested in lumbar steroid injections at this time. Pt at this time desires to proceed with surgical evaluation and medication evaluation. Of note, pt worked at [employer] for 22 years. She states that she now spends much of her time volunteering. More than 36 minutes was spent with pt extensively discussing her symptoms, reviewing her MRI, discussing treatment options and surgical intervention. Pain Scale: 5/10    PCP: Kendy Farrell NP     Past Medical History:   Diagnosis Date    Asthma     childhood diagnosis; occasional inhaler use    Chest pain     Diverticulitis     Dyslipidemia     \"borderline\" per pt.  Endometriosis     GERD (gastroesophageal reflux disease)     Hypothyroidism     Normal nuclear stress test 09/14/2011    No ischemia or infarct. EF 75%. No WMA. Neg max EST w/nondiagnostic EKG changes and atypical chest discomfort at peak exercise.     Restless leg syndrome     Thyroid disease     Tobacco use disorder     ongoing        Social History     Socioeconomic History    Marital status:      Spouse name: Not on file    Number of children: Not on file    Years of education: Not on file    Highest education level: Not on file   Occupational History    Not on file   Tobacco Use    Smoking status: Current Every Day Smoker     Packs/day: 0.25     Years: 25.00     Pack years: 6.25    Smokeless tobacco: Never Used   Substance and Sexual Activity    Alcohol use: Yes     Comment: social    Drug use: No    Sexual activity: Yes     Partners: Male     Birth control/protection: None   Other Topics Concern    Not on file   Social History Narrative    Not on file     Social Determinants of Health     Financial Resource Strain:     Difficulty of Paying Living Expenses: Not on file   Food Insecurity:     Worried About Running Out of Food in the Last Year: Not on file    Sudhir of Food in the Last Year: Not on file   Transportation Needs:     Lack of Transportation (Medical): Not on file    Lack of Transportation (Non-Medical):  Not on file   Physical Activity:     Days of Exercise per Week: Not on file    Minutes of Exercise per Session: Not on file   Stress:     Feeling of Stress : Not on file   Social Connections:     Frequency of Communication with Friends and Family: Not on file    Frequency of Social Gatherings with Friends and Family: Not on file    Attends Faith Services: Not on file    Active Member of [address] [place] [address] or Organizations: Not on file    Attends Club or Organization Meetings: Not on file    Marital Status: Not on file   Intimate Partner Violence:     Fear of Current or Ex-Partner: Not on file    Emotionally Abused: Not on file    Physically Abused: Not on file    Sexually Abused: Not on file   Housing Stability:     Unable to Pay for Housing in the Last Year: Not on file    Number of [place] in the Last Year: Not on file    Unstable Housing in the Last Year: Not on file       Current Outpatient Medications   Medication Sig Dispense Refill    pregabalin (Lyrica) 75 mg capsule Take 1 cap by mouth in the morning and 2 at night as directed. 90 Capsule 1    ergocalciferol (ERGOCALCIFEROL) 1,250 mcg (50,000 unit) capsule TAKE 1 CAPSULE BY MOUTH EVERY 7 DAYS 12 Capsule 0    methocarbamoL (ROBAXIN) 500 mg tablet Take 1 tab by mouth BID-TID as needed for muscle spasm. 60 Tablet 1    valACYclovir (VALTREX) 1 gram tablet TAKE 1 TABLET BY MOUTH TWICE DAILY AS NEEDED 20 Tablet 0    fluticasone propionate (FLONASE) 50 mcg/actuation nasal spray SHAKE LIQUID AND USE 2 SPRAYS IN EACH NOSTRIL DAILY 48 g 1    albuterol (PROVENTIL HFA, VENTOLIN HFA, PROAIR HFA) 90 mcg/actuation inhaler INHALE 2 PUFFS BY MOUTH EVERY 4 HOURS AS NEEDED FOR WHEEZING 18 g 2    levothyroxine (SYNTHROID) 112 mcg tablet TAKE 1 TABLET BY MOUTH EVERY DAY BEFORE BREAKFAST 90 Tab 0    montelukast (SINGULAIR) 10 mg tablet TAKE 1 TABLET BY MOUTH EVERY DAY 90 Tab 0    pravastatin (PRAVACHOL) 40 mg tablet TAKE 1 TABLET BY MOUTH EVERY NIGHT 30 Tab 0    naltrexone-buPROPion (CONTRAVE) 8-90 mg TbER ER tablet Week 1 1 tab PO QAM, Week 2 1QAM 1QHS, Week 3 2QAM 1 QHS, Week 4 & beyond 2QAM 2QHS (Patient taking differently: Take 1 Tablet by mouth as needed. Week 1 1 tab PO QAM, Week 2 1QAM 1QHS, Week 3 2QAM 1 QHS, Week 4 & beyond 2QAM 2QHS   ) 162 Tab 0    ibuprofen (MOTRIN) 600 mg tablet Take 1 Tab by mouth every six (6) hours as needed for Pain. (Patient not taking: Reported on 2/1/2022) 20 Tab 0    cyclobenzaprine (FLEXERIL) 10 mg tablet Take 1 Tab by mouth three (3) times daily as needed for Muscle Spasm(s). 15 Tab 0    cholecalciferol, VITAMIN D3, (VITAMIN D3) 5,000 unit tab tablet Take 5,000 Units by mouth daily.          Allergies   Allergen Reactions    Atorvastatin Other (comments)     Leg cramps and swelling    Sulfa (Sulfonamide Antibiotics) Hives         REVIEW OF SYSTEMS    Constitutional: Negative for fever, chills, or weight change. Respiratory: Negative for cough or shortness of breath. Cardiovascular: Negative for chest pain or palpitations. Gastrointestinal: Negative for acid reflux, change in bowel habits, or constipation. Genitourinary: Negative for dysuria and flank pain. Musculoskeletal: Positive for lumbar pain. Positive for right leg weakness. Skin: Negative for rash. Neurological: Negative for headaches, dizziness, or numbness. Positive for right leg paresthesias. Endo/Heme/Allergies: Negative for increased bruising. Psychiatric/Behavioral: Negative for difficulty with sleep. As per HPI    PHYSICAL EXAMINATION  Visit Vitals  Pulse (!) 58 Comment: pt asymptomatic, MD aware   Temp 97.5 °F (36.4 °C) (Temporal)   Ht 5' 6\" (1.676 m)   Wt 173 lb (78.5 kg)   SpO2 97%   BMI 27.92 kg/m²       Constitutional: Awake, alert, and in no acute distress. Neurological: 1+ symmetrical DTRs in the upper extremities. 1+ symmetrical DTRs in the lower extremities. Sensation to light touch is intact. Negative Gordon's sign bilaterally. Skin: warm, dry, and intact. Musculoskeletal: Moderate pain with extension, axial loading, and less with forward flexion. No pain with internal or external rotation of her hips or bilateral pincer maneuver. Positive slump test on the right. Hip Flex  Quads Hamstrings Ankle DF EHL Ankle PF   Right +4/5 +4/5 +4/5 +4/5 +4/5 +4/5   Left +4/5 +4/5 +4/5 +4/5 +4/5 +4/5     IMAGING:    Lumbar spine MRI from 02/14/2022 was personally reviewed with the patient and demonstrated:    FINDINGS:   All numbering assumes 5 lumbar type vertebrae.      Postoperative Changes: None     Alignment:  Unremarkable.      Osseous structures/Marrow: There is endplate marrow edema at the L3-L4 level as  well as edema in the right L3 and L4 pedicles. This is progressed since prior  imaging. The patient appears to have congenitally shortened pedicles. .      The cord terminates at L1.   No cord signal abnormalities appreciated.     Retroperitoneum/Incidental: The abdominal aorta is without evidence of aneurysm. No paraaortic adenopathy appreciated. Appears to be cystic changes in the  visualized portion of the liver as well as the right kidney. Due to differences  in technique, these portions of the liver and right kidney were not covered on  the most recent prior MRI.     Lower Thoracic Spine: The majority of the lower thoracic spine is only  visualized on the sagittal views. No significant canal or neural foraminal  stenosis appreciated.      T12-L1: Mild broad-based disc bulge which is minimally progressed since prior  MRI. No significant canal or neural foraminal stenosis. Patient has a right  perineural cyst at this level.     L1-L2 level: Congenitally shortened pedicles with facet arthropathy and mild  ligament flavum buckling and broad-based disc bulge combine to cause mild canal  narrowing. This is mildly progressed since prior imaging. No significant neural  foraminal stenosis.     L2-L3: Congenitally shortened pedicles with broad-based disc bulge and facet  arthropathy and ligamentum flavum combine to cause mild canal narrowing. This is  minimally progressed since prior imaging. Mild bilateral neural foraminal  narrowing is noted.     L3-L4: Congenitally shortened pedicles are present. Moderate facet arthropathy  is noted. Ligament flavum buckling is present. There is disc osteophyte  formation which is progressed since prior imaging. This is most pronounced on  the right at the site of marrow edema. There is moderate canal narrowing with  mild right lateral recess narrowing which is mildly progressed since prior  imaging. Severe right neural foraminal narrowing is noted which is new. The left  neural foramen is mildly narrowed.     L4-L5: Congenitally shortened pedicles are present. Moderate facet arthropathy  is noted. Ligament flavum buckling is noted. Minimal disc bulge is present.   These combine to cause moderate canal narrowing. Moderate bilateral neural  foraminal narrowing is noted. There is possible abutment of the exiting nerve  root on the left. These findings are progressed.     L5-S1: Severe facet arthropathy is noted. Congenitally shortened pedicles are  present. Broad-based disc bulge is noted which is mildly progressed as prior  imaging. There is moderate canal narrowing. There is likely abutment of the  descending S1 nerve roots in the lateral recesses as they cross the disc level. Severe left neural foraminal narrowing is noted. Moderate right neural foraminal  narrowing is present.     IMPRESSION  1. Multilevel progressive degenerative disc disease, facet arthropathy,  ligamentum flavum buckling with congenitally shortened pedicles.     2. Increased endplate edema and right L3 and L4 pedicle stress edema with  progressive disc osteophyte formation and severe right neural foraminal  narrowing at L3-L4     3. Progressive changes at L5-S1 with likely compression of the descending S1  nerve roots as they cross the disc level. There is additional severe left L5-S1  neural foraminal narrowing.      4.  Progressive changes at L4-L5 with possible abutment of the exiting left  nerve root in the left neural foramen. Cervical spine 2V x-rays from 02/01/2022 were reviewed and demonstrated:      Atherosclerosis bilaterally. Mild straightening of the cervical spine. Multilevel degenerative facet. Bridging osteophytes C5-6. Severe degenerative disc C5-6, C6-7.      Lumbar spine 4V x-rays from 02/01/2022 were reviewed and demonstrated:      Extensive atherosclerosis. Multilevel degenerative facet. Severe degenerative disc L5-S1. No instability. Written by Nnacy Boyle, as dictated by Claudean Pilling, MD.  I, Dr. Claudean Pilling confirm that all documentation is accurate.

## 2022-03-08 NOTE — PROGRESS NOTES
Brianna Marie presents today for   Chief Complaint   Patient presents with    Back Pain       Is someone accompanying this pt? no    Is the patient using any DME equipment during OV? no    Depression Screening:  3 most recent PHQ Screens 12/16/2021   Little interest or pleasure in doing things Not at all   Feeling down, depressed, irritable, or hopeless Not at all   Total Score PHQ 2 0       Learning Assessment:  Learning Assessment 5/18/2018   PRIMARY LEARNER Patient   BARRIERS PRIMARY LEARNER NONE   PRIMARY LANGUAGE ENGLISH   LEARNER PREFERENCE PRIMARY READING     LISTENING     -   ANSWERED BY self   RELATIONSHIP SELF       Abuse Screening:  Abuse Screening Questionnaire 12/16/2021   Do you ever feel afraid of your partner? N   Are you in a relationship with someone who physically or mentally threatens you? N   Is it safe for you to go home? Y       Fall Risk  Fall Risk Assessment, last 12 mths 12/16/2021   Able to walk? Yes   Fall in past 12 months? 1   Do you feel unsteady? 0   Are you worried about falling 0   Is TUG test greater than 12 seconds? 0   Is the gait abnormal? 0   Number of falls in past 12 months 1   Fall with injury? 0         Coordination of Care:  1. Have you been to the ER, urgent care clinic since your last visit? no  Hospitalized since your last visit? no    2. Have you seen or consulted any other health care providers outside of the [address] [place] [address] since your last visit? no Include any pap smears or colon screening.  no

## 2022-03-08 NOTE — PATIENT INSTRUCTIONS
Herniated Disc: Exercises  Introduction  Here are some examples of exercises for you to try. The exercises may be suggested for a condition or for rehabilitation. Start each exercise slowly. Ease off the exercises if you start to have pain. You will be told when to start these exercises and which ones will work best for you. How to stay safe  These exercises can help you move easier and feel better. But when you first start doing them, you may have more pain in your back. This is normal. But it is important to pay close attention to your pain during and after each exercise. · Keep doing these exercises if your pain stays the same or moves from your leg and buttock more toward the middle of your spine. Pain moving out of your leg and buttock is a good sign. · Stop doing these exercises if your pain gets worse in your leg and buttock. Stop if you start to have pain in your leg and buttock that you didn't have before. Be sure to do these exercises in the order they appear. Note how your pain changes before you move to the next one. If your pain is much worse right after exercise and stays worse the next day, do not do any of these exercises. How to do the exercises  1. Rest on belly    1. Lie on your stomach, with your head turned to the side. 2. Try to relax your lower back muscles as much as you can. 3. Continue to lie on your stomach for 2 minutes. · Keep your arms beside your body. · If that position bothers your neck, place your hands, one on top of the other, underneath your forehead. This will help support your head and neck. If lying in this position causes or increases pain down your leg, stop this exercise and do not do the next exercises. 2. Press-up back extension    1. Lie on your stomach, with your face down and your elbows tucked into your sides and under your shoulders. 2. Press your elbows down into the floor to raise your upper back.   3. Hold this position for 15 to 30 seconds. 4. Repeat 2 to 4 times. · As you do this, relax your stomach muscles and allow your back to arch without using your back muscles. · Let your low back relax completely as you arch up. Don't let your hips or pelvis come off the floor. Then relax, and return to the start position. Over time, work up to staying in the press-up position for up to 2 minutes. If lying in this position causes or increases pain down your leg, stop this exercise and do not do the next exercises. 3. Full press-up back extension    1. Lie on your stomach with your face down, keeping your elbows tucked into your sides and under your shoulders. 2. Straighten your elbows, and push your upper body up as far as you can. 3. Hold this position for 5 seconds, and then relax. 4. Repeat 10 times. Allow your lower back to sag. Keep your hips, pelvis, and legs relaxed. Each time, try to raise your upper body a little higher and hold your arms a bit straighter. If lying in this position causes or increases pain down your leg, stop this exercise and do not do the next exercises. If you can't do this exercise, you may instead try the backward bend exercise that follows. 4. Backward bend    1. Stand with your feet hip-width apart, and don't lock your knees. 2. Place your hands in the small of your back. 3. Bend backward as far as you can, keeping your knees straight. 4. Repeat 2 to 4 times. Your toes should be pointing forward. Hold this position for 2 to 3 seconds. Then return to your starting position. Each time, try to bend backward a little farther, until you bend backward as far as you can. If standing in this position causes or increases pain down your leg, stop this exercise. Follow-up care is a key part of your treatment and safety. Be sure to make and go to all appointments, and call your doctor if you are having problems. It's also a good idea to know your test results and keep a list of the medicines you take.   Where can you learn more? Go to http://www.gray.com/  Enter Z594 in the search box to learn more about \"Herniated Disc: Exercises. \"  Current as of: July 1, 2021               Content Version: 13.2  © 2648-1730 [employer]. Care instructions adapted under license by [employer] (which disclaims liability or warranty for this information). If you have questions about a medical condition or this instruction, always ask your healthcare professional. [place] [address] any warranty or liability for your use of this information.

## 2022-03-19 PROBLEM — L72.3 SEBACEOUS CYST: Status: ACTIVE | Noted: 2018-05-18

## 2022-03-20 PROBLEM — N60.81 SEBACEOUS CYST OF BREAST, RIGHT: Status: ACTIVE | Noted: 2018-05-18

## 2022-03-23 ENCOUNTER — TELEPHONE (OUTPATIENT)
Dept: ORTHOPEDIC SURGERY | Age: 67
End: 2022-03-23

## 2022-04-21 RX ORDER — MONTELUKAST SODIUM 10 MG/1
10 TABLET ORAL DAILY
Qty: 90 TABLET | Refills: 0 | Status: SHIPPED | OUTPATIENT
Start: 2022-04-21 | End: 2022-06-28 | Stop reason: SDUPTHER

## 2022-05-26 ENCOUNTER — OFFICE VISIT (OUTPATIENT)
Dept: ORTHOPEDIC SURGERY | Age: 67
End: 2022-05-26
Payer: MEDICARE

## 2022-05-26 VITALS — OXYGEN SATURATION: 98 % | HEART RATE: 65 BPM | WEIGHT: 177 LBS | TEMPERATURE: 96.2 F | BODY MASS INDEX: 28.57 KG/M2

## 2022-05-26 DIAGNOSIS — M48.061 LUMBAR STENOSIS WITHOUT NEUROGENIC CLAUDICATION: ICD-10-CM

## 2022-05-26 DIAGNOSIS — M47.816 LUMBAR FACET ARTHROPATHY: Primary | ICD-10-CM

## 2022-05-26 PROCEDURE — 1101F PT FALLS ASSESS-DOCD LE1/YR: CPT | Performed by: PHYSICAL MEDICINE & REHABILITATION

## 2022-05-26 PROCEDURE — G8432 DEP SCR NOT DOC, RNG: HCPCS | Performed by: PHYSICAL MEDICINE & REHABILITATION

## 2022-05-26 PROCEDURE — 99213 OFFICE O/P EST LOW 20 MIN: CPT | Performed by: PHYSICAL MEDICINE & REHABILITATION

## 2022-05-26 PROCEDURE — G8399 PT W/DXA RESULTS DOCUMENT: HCPCS | Performed by: PHYSICAL MEDICINE & REHABILITATION

## 2022-05-26 PROCEDURE — G9899 SCRN MAM PERF RSLTS DOC: HCPCS | Performed by: PHYSICAL MEDICINE & REHABILITATION

## 2022-05-26 PROCEDURE — G8417 CALC BMI ABV UP PARAM F/U: HCPCS | Performed by: PHYSICAL MEDICINE & REHABILITATION

## 2022-05-26 PROCEDURE — G8536 NO DOC ELDER MAL SCRN: HCPCS | Performed by: PHYSICAL MEDICINE & REHABILITATION

## 2022-05-26 PROCEDURE — 1123F ACP DISCUSS/DSCN MKR DOCD: CPT | Performed by: PHYSICAL MEDICINE & REHABILITATION

## 2022-05-26 PROCEDURE — G8427 DOCREV CUR MEDS BY ELIG CLIN: HCPCS | Performed by: PHYSICAL MEDICINE & REHABILITATION

## 2022-05-26 PROCEDURE — 3017F COLORECTAL CA SCREEN DOC REV: CPT | Performed by: PHYSICAL MEDICINE & REHABILITATION

## 2022-05-26 PROCEDURE — 1090F PRES/ABSN URINE INCON ASSESS: CPT | Performed by: PHYSICAL MEDICINE & REHABILITATION

## 2022-05-26 NOTE — PROGRESS NOTES
[facility] [place] [address]  [address]  [facility], [address]  Phone: [phone]  Fax: [phone]        Jenni Marie  : 1955  PCP: Gigi Walker NP    PROGRESS NOTE      ASSESSMENT AND PLAN    Diagnoses and all orders for this visit:    1. Lumbar facet arthropathy    2. Lumbar stenosis without neurogenic claudication        1. Francisca Gibbs is a 78 y.o. female with primary right sided axial low back pain. She is not having much in the way of stenotic symptoms. 2. Patient given information on RFA  3. Risks, benefits, alternatives, and limitations of RFA discussed with patient. Patient wishes to hold off at this time. Follow-up and Dispositions    · Return for continue fu w/Dr. Pedroza. HISTORY OF PRESENT ILLNESS      Brianna Marie is a 78 y.o. female presents at the request of Dr. Blair Arrtank for follow up of back pain/RFA consultation. LV 3/2022 with Dr. Pedroza, prescribed of Lyrica 75/150. She reports constant right-sided back pain and intermittent sciatica. Back pain > sciatica. Her pain is exacerbated with sitting and transitioning from sit to stand. She states her pain is relieved with walking. Patient notes she has flares that lasts for days. Patient has intermittent numbness and tingling in his left foot and toes. She takes Ibuprofen PRN with benefit. Denies side effects. Patient states Lyrica is ineffective, though she admits she did not take it for more than a few weeks. She is compliant with her HEP. Pain Assessment  2022   Location of Pain Back   Pain Location Comment -   Location Modifiers Right   Severity of Pain 4   Quality of Pain Burning; Edman Irais; Throbbing   Duration of Pain A few days   Frequency of Pain Rarely   Date Pain First Started -   Aggravating Factors Bending   Aggravating Factors Comment -   Limiting Behavior Some   Relieving Factors Other (Comment)   Relieving Factors Comment walking   Result of Injury No   Work-Related Injury -   Type of Injury -   Type of Injury Comment -     Onset: chronic    Investigations:   L MRI 2/2022: stenosis L5-S1 with FA  Spine surgery consult: yes with Dr. Stephanie Castillo, recommended RFA    Treatments:  Physical therapy: no  Spinal injections: unknown  Spinal surgery- no  Beneficial medications: Ibuprofen  Failed medications: none    Work Status: retired  Pertinent PMHx:  RLS, thyroid disease, GERD      PHYSICAL EXAMINATION    Visit Vitals  Pulse 65   Temp (!) 96.2 °F (35.7 °C) (Oral)   Wt 177 lb (80.3 kg)   SpO2 98%   BMI 28.57 kg/m²       TTP L4-5 (R>L), L5-S1, B/L trochanteric bursa  Lumbar flexion to knees  Back pain with right hip ROM   Lower extremity strength intact  Straight leg raise is negative  No clonus              Written by Reshma Blanton, as dictated by Maru Morse MD.

## 2022-05-26 NOTE — Clinical Note
5/27/2022    Patient: Ronni Marie   YOB: 1955   Date of Visit: 5/26/2022     Jefferson Paulson NP  [address] [facility] [place]  [address] [facility]  [address]  [address] [address]    Dear JANNY Carr MD,      Thank you for referring Ms. Brianna Marie to [facility] for evaluation. My notes for this consultation are attached. If you have questions, please do not hesitate to call me. I look forward to following your patient along with you.       Sincerely,    Victor Manuel Gudino MD

## 2022-05-26 NOTE — PATIENT INSTRUCTIONS
Learning About Medial Branch Block and Neurotomy  What are medial branch block and neurotomy? Facet joints connect your vertebrae to each other. Problems in these joints can cause chronic (long-term) pain in the neck or back. Medial branch nerves are the nerves that carry many of the pain messages from your facet joints. Radiofrequency medial branch neurotomy is a type of medial branch neurotomy that is used to relieve arthritis pain. It uses radio waves to damage nerves in your neck or back so that they can no longer send pain messages to your brain. Before your doctor knows if a neurotomy will help you, you will get a medial branch block to find out if certain nerves are the ones that are a source of your pain. You will need two separate visits to the outpatient center or hospital to have both procedures. You will need someone to drive you home. How is a medial branch block done? The doctor will use a tiny needle to numb the skin where you will get the block. Then the doctor puts the block needle into the numbed area. You may feel some pressure, but you should not feel pain. Using fluoroscopy (live X-ray) to guide the needle, the doctor injects medicine onto one or more nerves to make them numb. If you get relief from your pain in the next 4 to 6 hours, it's a sign that those nerves may be contributing to your pain. The relief will last only a short time. You may then have a medial branch neurotomy at a later visit to try to get longer relief. How is a medial branch neurotomy done? The doctor will use a tiny needle to numb the skin where you will get the neurotomy. Then the doctor puts the neurotomy needle into the numbed area. You may feel some pressure. Using fluoroscopy (live X-ray) to guide the needle, the doctor sends radio waves through the needle to the nerve for 60 to 90 seconds. The radio waves heat the nerve, which damages it. The doctor may do this several times.  And more than one nerve may be treated. How long do medial branch block and neurotomy take? It takes 20 to 30 minutes to get the block. You can go home after the doctor watches you for about an hour. It takes 45 to 90 minutes to get a neurotomy, depending on how many nerves are heated. You will probably go home 30 to 60 minutes after the procedure. What can you expect after a neurotomy? You will get instructions on how to report how much pain you have when you are at home. You may feel a little sore or tender at the injection site at first. But after a successful neurotomy, most people have pain relief right away. It often lasts for several months, but your pain may come back. If your pain does come back, it may mean that the damaged nerve has healed and can send pain messages again. Or it can mean that a different nerve is causing pain. Your doctor will discuss your options with you. Follow-up care is a key part of your treatment and safety. Be sure to make and go to all appointments, and call your doctor if you are having problems. It's also a good idea to know your test results and keep a list of the medicines you take. Where can you learn more? Go to http://www.gray.com/  Enter T494 in the search box to learn more about \"Learning About Medial Branch Block and Neurotomy. \"  Current as of: December 13, 2021               Content Version: 13.2  © 6163-7789 Healthwise, Incorporated. Care instructions adapted under license by [employer] (which disclaims liability or warranty for this information). If you have questions about a medical condition or this instruction, always ask your healthcare professional. [place] [address] any warranty or liability for your use of this information. Learning About Medial Branch Block and Neurotomy  What are medial branch block and neurotomy? Facet joints connect your vertebrae to each other.  Problems in these joints can cause chronic (long-term) pain in the neck or back. Medial branch nerves are the nerves that carry many of the pain messages from your facet joints. Radiofrequency medial branch neurotomy is a type of medial branch neurotomy that is used to relieve arthritis pain. It uses radio waves to damage nerves in your neck or back so that they can no longer send pain messages to your brain. Before your doctor knows if a neurotomy will help you, you will get a medial branch block to find out if certain nerves are the ones that are a source of your pain. You will need two separate visits to the outpatient center or hospital to have both procedures. You will need someone to drive you home. How is a medial branch block done? The doctor will use a tiny needle to numb the skin where you will get the block. Then the doctor puts the block needle into the numbed area. You may feel some pressure, but you should not feel pain. Using fluoroscopy (live X-ray) to guide the needle, the doctor injects medicine onto one or more nerves to make them numb. If you get relief from your pain in the next 4 to 6 hours, it's a sign that those nerves may be contributing to your pain. The relief will last only a short time. You may then have a medial branch neurotomy at a later visit to try to get longer relief. How is a medial branch neurotomy done? The doctor will use a tiny needle to numb the skin where you will get the neurotomy. Then the doctor puts the neurotomy needle into the numbed area. You may feel some pressure. Using fluoroscopy (live X-ray) to guide the needle, the doctor sends radio waves through the needle to the nerve for 60 to 90 seconds. The radio waves heat the nerve, which damages it. The doctor may do this several times. And more than one nerve may be treated. How long do medial branch block and neurotomy take? It takes 20 to 30 minutes to get the block. You can go home after the doctor watches you for about an hour.   It takes 45 to 90 minutes to get a neurotomy, depending on how many nerves are heated. You will probably go home 30 to 60 minutes after the procedure. What can you expect after a neurotomy? You will get instructions on how to report how much pain you have when you are at home. You may feel a little sore or tender at the injection site at first. But after a successful neurotomy, most people have pain relief right away. It often lasts for several months, but your pain may come back. If your pain does come back, it may mean that the damaged nerve has healed and can send pain messages again. Or it can mean that a different nerve is causing pain. Your doctor will discuss your options with you. Follow-up care is a key part of your treatment and safety. Be sure to make and go to all appointments, and call your doctor if you are having problems. It's also a good idea to know your test results and keep a list of the medicines you take. Where can you learn more? Go to http://www.gray.com/  Enter T494 in the search box to learn more about \"Learning About Medial Branch Block and Neurotomy. \"  Current as of: December 13, 2021               Content Version: 13.2  © 1475-7729 Healthwise, Incorporated. Care instructions adapted under license by [employer] (which disclaims liability or warranty for this information). If you have questions about a medical condition or this instruction, always ask your healthcare professional. [place] [address] any warranty or liability for your use of this information.

## 2022-06-28 ENCOUNTER — OFFICE VISIT (OUTPATIENT)
Dept: FAMILY MEDICINE CLINIC | Age: 67
End: 2022-06-28
Payer: MEDICARE

## 2022-06-28 VITALS
OXYGEN SATURATION: 96 % | SYSTOLIC BLOOD PRESSURE: 142 MMHG | WEIGHT: 177 LBS | BODY MASS INDEX: 28.45 KG/M2 | HEIGHT: 66 IN | DIASTOLIC BLOOD PRESSURE: 73 MMHG | RESPIRATION RATE: 18 BRPM | TEMPERATURE: 98.4 F | HEART RATE: 75 BPM

## 2022-06-28 DIAGNOSIS — J45.31 MILD PERSISTENT ASTHMA WITH ACUTE EXACERBATION: ICD-10-CM

## 2022-06-28 DIAGNOSIS — J01.90 ACUTE NON-RECURRENT SINUSITIS, UNSPECIFIED LOCATION: Primary | ICD-10-CM

## 2022-06-28 DIAGNOSIS — E03.9 HYPOTHYROIDISM, UNSPECIFIED TYPE: ICD-10-CM

## 2022-06-28 DIAGNOSIS — F17.200 TOBACCO USE DISORDER: ICD-10-CM

## 2022-06-28 PROCEDURE — 1101F PT FALLS ASSESS-DOCD LE1/YR: CPT | Performed by: STUDENT IN AN ORGANIZED HEALTH CARE EDUCATION/TRAINING PROGRAM

## 2022-06-28 PROCEDURE — 99214 OFFICE O/P EST MOD 30 MIN: CPT | Performed by: STUDENT IN AN ORGANIZED HEALTH CARE EDUCATION/TRAINING PROGRAM

## 2022-06-28 PROCEDURE — 1090F PRES/ABSN URINE INCON ASSESS: CPT | Performed by: STUDENT IN AN ORGANIZED HEALTH CARE EDUCATION/TRAINING PROGRAM

## 2022-06-28 PROCEDURE — G8399 PT W/DXA RESULTS DOCUMENT: HCPCS | Performed by: STUDENT IN AN ORGANIZED HEALTH CARE EDUCATION/TRAINING PROGRAM

## 2022-06-28 PROCEDURE — G8536 NO DOC ELDER MAL SCRN: HCPCS | Performed by: STUDENT IN AN ORGANIZED HEALTH CARE EDUCATION/TRAINING PROGRAM

## 2022-06-28 PROCEDURE — 1123F ACP DISCUSS/DSCN MKR DOCD: CPT | Performed by: STUDENT IN AN ORGANIZED HEALTH CARE EDUCATION/TRAINING PROGRAM

## 2022-06-28 PROCEDURE — 3017F COLORECTAL CA SCREEN DOC REV: CPT | Performed by: STUDENT IN AN ORGANIZED HEALTH CARE EDUCATION/TRAINING PROGRAM

## 2022-06-28 PROCEDURE — G8510 SCR DEP NEG, NO PLAN REQD: HCPCS | Performed by: STUDENT IN AN ORGANIZED HEALTH CARE EDUCATION/TRAINING PROGRAM

## 2022-06-28 PROCEDURE — G8417 CALC BMI ABV UP PARAM F/U: HCPCS | Performed by: STUDENT IN AN ORGANIZED HEALTH CARE EDUCATION/TRAINING PROGRAM

## 2022-06-28 PROCEDURE — G8427 DOCREV CUR MEDS BY ELIG CLIN: HCPCS | Performed by: STUDENT IN AN ORGANIZED HEALTH CARE EDUCATION/TRAINING PROGRAM

## 2022-06-28 PROCEDURE — G9899 SCRN MAM PERF RSLTS DOC: HCPCS | Performed by: STUDENT IN AN ORGANIZED HEALTH CARE EDUCATION/TRAINING PROGRAM

## 2022-06-28 RX ORDER — METHYLPREDNISOLONE 4 MG/1
TABLET ORAL
Qty: 1 DOSE PACK | Refills: 0 | Status: SHIPPED | OUTPATIENT
Start: 2022-06-28

## 2022-06-28 RX ORDER — MONTELUKAST SODIUM 10 MG/1
10 TABLET ORAL DAILY
Qty: 90 TABLET | Refills: 1 | Status: SHIPPED | OUTPATIENT
Start: 2022-06-28

## 2022-06-28 NOTE — PROGRESS NOTES
Shea Kumar is a 78 y.o. female presenting today for Establish Care and Sinus Infection  . Chief Complaint   Patient presents with   [facility] Establish Care    Sinus Infection       HPI:  Brianna Marie presents to the office today to establish care. Patient has a past medical history of HLD, hypothyroidism, vitamin D deficiency. Patient states she started feeling sick last week and was seen for Sinusitis last Monday at [facility] -he was prescribed amoxicillin for 10 days. She has been taking the antibiotic but continues to feel very congested and stuffy. No fever or chills. Slight intermittent headache. Patient is a smoker. She smokes 5 cigs/day. Patient has a history of allergies. She takes Flonase for allergic rhinitis-she has not been taking this recently. She has mild asthma for which she takes albuterol as needed. Denies any shortness of breath or nighttime symptoms. Reports occasional wheezing. Uses the albuterol inhaler twice in a week. HLD: Lipid panel in 11/21 showed triglyceride 88, HDL 63 and .4. Hypothyroidism: TSH in 12/20 was 5. 18. Continue taking Synthroid  112 mcg daily. Follows with Dr. Britt Young. Lumbar stenosis: Patient follows with Dr. Kristina Goel. She is prescribed Lyrica and Robaxin. Pain improves with ibuprofen as needed. MRI lumbar spine in 2/22 showed multilevel progressive degenerative disc disease severe left L5-S1 neural foraminal narrowing. Review of Systems   Constitutional: Negative for chills, diaphoresis, fever, malaise/fatigue and weight loss. HENT: Positive for congestion and sinus pain. Negative for ear discharge, ear pain, hearing loss, nosebleeds, sore throat and tinnitus. Eyes: Negative for blurred vision, double vision and photophobia. Respiratory: Negative for cough, sputum production, shortness of breath, wheezing and stridor. Cardiovascular: Negative for chest pain, palpitations, orthopnea, claudication and leg swelling. Gastrointestinal: Negative for abdominal pain, constipation, diarrhea, heartburn, nausea and vomiting. Genitourinary: Negative for dysuria, flank pain, frequency, hematuria and urgency. Musculoskeletal: Positive for back pain. Negative for joint pain, myalgias and neck pain. Skin: Negative for rash. Neurological: Positive for headaches. Negative for tingling, tremors, sensory change, speech change, focal weakness, seizures and weakness. Psychiatric/Behavioral: Negative for depression. The patient is not nervous/anxious. All other systems reviewed and are negative. Allergies   Allergen Reactions    Atorvastatin Other (comments)     Leg cramps and swelling    Sulfa (Sulfonamide Antibiotics) Hives       PHQ Screening   3 most recent PHQ Screens 6/28/2022   Little interest or pleasure in doing things Not at all   Feeling down, depressed, irritable, or hopeless Not at all   Total Score PHQ 2 0       History  Past Medical History:   Diagnosis Date    Asthma     childhood diagnosis; occasional inhaler use    Chest pain     Diverticulitis     Dyslipidemia     \"borderline\" per pt.  Endometriosis     GERD (gastroesophageal reflux disease)     Hypothyroidism     Normal nuclear stress test 09/14/2011    No ischemia or infarct. EF 75%. No WMA. Neg max EST w/nondiagnostic EKG changes and atypical chest discomfort at peak exercise.     Restless leg syndrome     Thyroid disease     Tobacco use disorder     ongoing       Past Surgical History:   Procedure Laterality Date    FOOT/TOES SURGERY PROC UNLISTED      HX HYSTERECTOMY      HX HYSTEROSCOPY      HX OVARIAN CYST REMOVAL      teenager    HX TUBAL LIGATION      30 years ago       Social History     Socioeconomic History    Marital status:      Spouse name: Not on file    Number of children: Not on file    Years of education: Not on file    Highest education level: Not on file   Occupational History    Not on file   Tobacco Use  Smoking status: Current Every Day Smoker     Packs/day: 0.25     Years: 25.00     Pack years: 6.25    Smokeless tobacco: Never Used   Substance and Sexual Activity    Alcohol use: Yes     Comment: social    Drug use: No    Sexual activity: Yes     Partners: Male     Birth control/protection: None   Other Topics Concern    Not on file   Social History Narrative    Not on file     Social Determinants of Health     Financial Resource Strain:     Difficulty of Paying Living Expenses: Not on file   Food Insecurity:     Worried About Running Out of Food in the Last Year: Not on file    Sudhir of Food in the Last Year: Not on file   Transportation Needs:     Lack of Transportation (Medical): Not on file    Lack of Transportation (Non-Medical): Not on file   Physical Activity:     Days of Exercise per Week: Not on file    Minutes of Exercise per Session: Not on file   Stress:     Feeling of Stress : Not on file   Social Connections:     Frequency of Communication with Friends and Family: Not on file    Frequency of Social Gatherings with Friends and Family: Not on file    Attends Faith Services: Not on file    Active Member of [address] [place] [address] or Organizations: Not on file    Attends Club or Organization Meetings: Not on file    Marital Status: Not on file   Intimate Partner Violence:     Fear of Current or Ex-Partner: Not on file    Emotionally Abused: Not on file    Physically Abused: Not on file    Sexually Abused: Not on file   Housing Stability:     Unable to Pay for Housing in the Last Year: Not on file    Number of [place] in the Last Year: Not on file    Unstable Housing in the Last Year: Not on file       Current Outpatient Medications   Medication Sig Dispense Refill    montelukast (SINGULAIR) 10 mg tablet Take 1 Tablet by mouth daily. Indications: controller medication for asthma 90 Tablet 1    methylPREDNISolone (MEDROL DOSEPACK) 4 mg tablet Take as directed on instructions.  1 Dose Pack 0    valACYclovir (VALTREX) 1 gram tablet TAKE 1 TABLET BY MOUTH TWICE DAILY AS NEEDED 20 Tablet 3    pregabalin (Lyrica) 75 mg capsule Take 1 cap by mouth in the morning and 2 at night as directed. 90 Capsule 1    ergocalciferol (ERGOCALCIFEROL) 1,250 mcg (50,000 unit) capsule TAKE 1 CAPSULE BY MOUTH EVERY 7 DAYS 12 Capsule 0    methocarbamoL (ROBAXIN) 500 mg tablet Take 1 tab by mouth BID-TID as needed for muscle spasm. 60 Tablet 1    fluticasone propionate (FLONASE) 50 mcg/actuation nasal spray SHAKE LIQUID AND USE 2 SPRAYS IN EACH NOSTRIL DAILY 48 g 1    albuterol (PROVENTIL HFA, VENTOLIN HFA, PROAIR HFA) 90 mcg/actuation inhaler INHALE 2 PUFFS BY MOUTH EVERY 4 HOURS AS NEEDED FOR WHEEZING 18 g 2    levothyroxine (SYNTHROID) 112 mcg tablet TAKE 1 TABLET BY MOUTH EVERY DAY BEFORE BREAKFAST 90 Tab 0    pravastatin (PRAVACHOL) 40 mg tablet TAKE 1 TABLET BY MOUTH EVERY NIGHT 30 Tab 0    naltrexone-buPROPion (CONTRAVE) 8-90 mg TbER ER tablet Week 1 1 tab PO QAM, Week 2 1QAM 1QHS, Week 3 2QAM 1 QHS, Week 4 & beyond 2QAM 2QHS (Patient taking differently: Take 1 Tablet by mouth as needed. Week 1 1 tab PO QAM, Week 2 1QAM 1QHS, Week 3 2QAM 1 QHS, Week 4 & beyond 2QAM 2QHS   ) 162 Tab 0         Vitals:    06/28/22 1101   BP: (!) 142/73   Pulse: 75   Resp: 18   Temp: 98.4 °F (36.9 °C)   TempSrc: Temporal   SpO2: 96%   Weight: 177 lb (80.3 kg)   Height: 5' 6\" (1.676 m)   PainSc:   0 - No pain       Physical Exam  Vitals and nursing note reviewed. Constitutional:       General: She is not in acute distress. Appearance: Normal appearance. She is not ill-appearing, toxic-appearing or diaphoretic. HENT:      Head: Normocephalic and atraumatic. Nose: Congestion present. No rhinorrhea. Comments: Swollen turbinates with erythema     Mouth/Throat:      Pharynx: Oropharynx is clear. No oropharyngeal exudate or posterior oropharyngeal erythema. Eyes:      General: No scleral icterus. Extraocular Movements: Extraocular movements intact. Conjunctiva/sclera: Conjunctivae normal.      Pupils: Pupils are equal, round, and reactive to light. Cardiovascular:      Rate and Rhythm: Normal rate and regular rhythm. Pulses: Normal pulses. Heart sounds: No murmur heard. Pulmonary:      Effort: Pulmonary effort is normal. No respiratory distress. Breath sounds: Wheezing present. No rales. Abdominal:      General: Bowel sounds are normal. There is no distension. Palpations: Abdomen is soft. Tenderness: There is no abdominal tenderness. There is no guarding. Musculoskeletal:         General: Normal range of motion. Cervical back: Normal range of motion. Right lower leg: No edema. Left lower leg: No edema. Skin:     General: Skin is warm and dry. Coloration: Skin is not jaundiced or pale. Neurological:      General: No focal deficit present. Mental Status: She is alert and oriented to person, place, and time. Mental status is at baseline. Cranial Nerves: No cranial nerve deficit. Motor: No weakness. Gait: Gait normal.   Psychiatric:         Mood and Affect: Mood normal.         Behavior: Behavior normal.         Thought Content: Thought content normal.         Judgment: Judgment normal.         No visits with results within 3 Month(s) from this visit. Latest known visit with results is:   Abstract on 01/18/2022   Component Date Value Ref Range Status    SARS-CoV-2, TAMMY 08/25/2021 Not Detected   Final       No results found for any visits on 06/28/22. Patient Care Team:  Patient Care Team:  Arjun Aguilar MD as PCP - General (Internal Medicine Physician)  Mariaa Magallanes NP as PCP - [facility]  Christian Downing, RN as Nurse Julio Sahu MD (Surgery Physician)  Filippo Dyer MD (Physical Medicine and Rehabilitation Physician)      Assessment / Plan:      ICD-10-CM ICD-9-CM    1. Acute non-recurrent sinusitis, unspecified location  J01.90 461.9 methylPREDNISolone (MEDROL DOSEPACK) 4 mg tablet   2. Mild persistent asthma with acute exacerbation  J45.31 493.92 montelukast (SINGULAIR) 10 mg tablet      methylPREDNISolone (MEDROL DOSEPACK) 4 mg tablet   3. Hypothyroidism, unspecified type  E03.9 244.9    4. Tobacco use disorder  F17.200 305.1      Sinusitis: Advised to complete amoxicillin dose. Will prescribe Medrol Dosepak. Advised to use nasal spray. Asthma: Notable soft wheeze on exam. Continue albuterol as needed. Refill singulair. Counseled on smoking cessation. Hypothyroidism: Stable. Continue Synthroid. Patient is following with Dr. Sam Carter. Discussed vaccinations. Patient has received 1 dose of COVID-19 vaccine. She does not want to continue with the series. Does not want to get the shingles vaccine due to severe side effects experienced by her sister. Agreeable to get PCV 20 at next visit. Due for mammogram.  States that she will self schedule it. Follow-up and Dispositions    · Return in about 4 months (around 10/28/2022). I asked the patient if she  had any questions and answered her  questions. The patient stated that she understands the treatment plan and agrees with the treatment plan    This document was created with a voice activated dictation system and may contain transcription errors.

## 2022-10-25 ENCOUNTER — OFFICE VISIT (OUTPATIENT)
Dept: FAMILY MEDICINE CLINIC | Age: 67
End: 2022-10-25
Payer: MEDICARE

## 2022-10-25 VITALS
RESPIRATION RATE: 15 BRPM | WEIGHT: 181 LBS | SYSTOLIC BLOOD PRESSURE: 144 MMHG | OXYGEN SATURATION: 96 % | DIASTOLIC BLOOD PRESSURE: 68 MMHG | HEIGHT: 66 IN | BODY MASS INDEX: 29.09 KG/M2

## 2022-10-25 DIAGNOSIS — J45.40 MODERATE PERSISTENT ASTHMA, UNSPECIFIED WHETHER COMPLICATED: Primary | ICD-10-CM

## 2022-10-25 DIAGNOSIS — R03.0 ELEVATED BP WITHOUT DIAGNOSIS OF HYPERTENSION: ICD-10-CM

## 2022-10-25 DIAGNOSIS — J43.9 PULMONARY EMPHYSEMA, UNSPECIFIED EMPHYSEMA TYPE (HCC): ICD-10-CM

## 2022-10-25 DIAGNOSIS — Z87.09 HISTORY OF ASTHMA: ICD-10-CM

## 2022-10-25 DIAGNOSIS — Z23 NEEDS FLU SHOT: ICD-10-CM

## 2022-10-25 PROCEDURE — G8417 CALC BMI ABV UP PARAM F/U: HCPCS | Performed by: STUDENT IN AN ORGANIZED HEALTH CARE EDUCATION/TRAINING PROGRAM

## 2022-10-25 PROCEDURE — G9899 SCRN MAM PERF RSLTS DOC: HCPCS | Performed by: STUDENT IN AN ORGANIZED HEALTH CARE EDUCATION/TRAINING PROGRAM

## 2022-10-25 PROCEDURE — G0008 ADMIN INFLUENZA VIRUS VAC: HCPCS | Performed by: STUDENT IN AN ORGANIZED HEALTH CARE EDUCATION/TRAINING PROGRAM

## 2022-10-25 PROCEDURE — 1101F PT FALLS ASSESS-DOCD LE1/YR: CPT | Performed by: STUDENT IN AN ORGANIZED HEALTH CARE EDUCATION/TRAINING PROGRAM

## 2022-10-25 PROCEDURE — G8536 NO DOC ELDER MAL SCRN: HCPCS | Performed by: STUDENT IN AN ORGANIZED HEALTH CARE EDUCATION/TRAINING PROGRAM

## 2022-10-25 PROCEDURE — 1123F ACP DISCUSS/DSCN MKR DOCD: CPT | Performed by: STUDENT IN AN ORGANIZED HEALTH CARE EDUCATION/TRAINING PROGRAM

## 2022-10-25 PROCEDURE — 1090F PRES/ABSN URINE INCON ASSESS: CPT | Performed by: STUDENT IN AN ORGANIZED HEALTH CARE EDUCATION/TRAINING PROGRAM

## 2022-10-25 PROCEDURE — 90694 VACC AIIV4 NO PRSRV 0.5ML IM: CPT | Performed by: STUDENT IN AN ORGANIZED HEALTH CARE EDUCATION/TRAINING PROGRAM

## 2022-10-25 PROCEDURE — G8399 PT W/DXA RESULTS DOCUMENT: HCPCS | Performed by: STUDENT IN AN ORGANIZED HEALTH CARE EDUCATION/TRAINING PROGRAM

## 2022-10-25 PROCEDURE — G8427 DOCREV CUR MEDS BY ELIG CLIN: HCPCS | Performed by: STUDENT IN AN ORGANIZED HEALTH CARE EDUCATION/TRAINING PROGRAM

## 2022-10-25 PROCEDURE — 99213 OFFICE O/P EST LOW 20 MIN: CPT | Performed by: STUDENT IN AN ORGANIZED HEALTH CARE EDUCATION/TRAINING PROGRAM

## 2022-10-25 PROCEDURE — 3017F COLORECTAL CA SCREEN DOC REV: CPT | Performed by: STUDENT IN AN ORGANIZED HEALTH CARE EDUCATION/TRAINING PROGRAM

## 2022-10-25 PROCEDURE — G8510 SCR DEP NEG, NO PLAN REQD: HCPCS | Performed by: STUDENT IN AN ORGANIZED HEALTH CARE EDUCATION/TRAINING PROGRAM

## 2022-10-25 RX ORDER — ALBUTEROL SULFATE 90 UG/1
2 AEROSOL, METERED RESPIRATORY (INHALATION)
Qty: 18 G | Refills: 2 | Status: SHIPPED | OUTPATIENT
Start: 2022-10-25

## 2022-10-25 NOTE — PROGRESS NOTES
Bria Mendez is a 78 y.o. female presenting today for Follow Up Chronic Condition (Productive cough with green sputum. States having congestion and eye watering. Was seen on 10/10 and 8/24 for sinus related issues. Denies any other issues or concerns. )  . Chief Complaint   Patient presents with    Follow Up Chronic Condition     Productive cough with green sputum. States having congestion and eye watering. Was seen on 10/10 and 8/24 for sinus related issues. Denies any other issues or concerns. HPI:  Nnamdi Marie presents to the office today for follow up. Patient has a past medical history of HLD, hypothyroidism, vitamin D deficiency. Patient states she started feeling sick last week and was seen for Sinusitis last Monday at [facility] -he was prescribed amoxicillin for 10 days. She has been taking the antibiotic but continues to feel very congested and stuffy. No fever or chills. Slight intermittent headache. Patient has had recurrent respiratory infections requiring visit to the ED. Patient is a smoker. She smokes 5 cigs/day. CT lung cancer screening in 6/2021 showed small 2 to 3 mm pulmonary nodules, mild emphysema. Patient has a history of allergies. She takes Flonase for allergic rhinitis-she has not been taking this recently. She has mild asthma for which she takes albuterol as needed. Denies any shortness of breath or nighttime symptoms. Reports occasional wheezing. Uses the albuterol inhaler twice in a week. HLD: Lipid panel in 11/21 showed triglyceride 88, HDL 63 and .4. Hypothyroidism: TSH in 12/20 was 5. 18. Continue taking Synthroid  112 mcg daily. Follows with Dr. Soraya Birch. Lumbar stenosis: Patient follows with Dr. Sweta Page. She is prescribed Lyrica and Robaxin. Pain improves with ibuprofen as needed. MRI lumbar spine in 2/22 showed multilevel progressive degenerative disc disease severe left L5-S1 neural foraminal narrowing.     Review of Systems   Constitutional:  Negative for chills, diaphoresis, fever, malaise/fatigue and weight loss. HENT:  Positive for congestion and sinus pain. Negative for ear discharge, ear pain, hearing loss, nosebleeds, sore throat and tinnitus. Eyes:  Negative for blurred vision, double vision and photophobia. Respiratory:  Negative for cough, sputum production, shortness of breath, wheezing and stridor. Cardiovascular:  Negative for chest pain, palpitations, orthopnea, claudication and leg swelling. Gastrointestinal:  Negative for abdominal pain, constipation, diarrhea, heartburn, nausea and vomiting. Genitourinary:  Negative for dysuria, flank pain, frequency, hematuria and urgency. Musculoskeletal:  Positive for back pain. Negative for joint pain, myalgias and neck pain. Skin:  Negative for rash. Neurological:  Positive for headaches. Negative for tingling, tremors, sensory change, speech change, focal weakness, seizures and weakness. Psychiatric/Behavioral:  Negative for depression. The patient is not nervous/anxious. All other systems reviewed and are negative. Allergies   Allergen Reactions    Atorvastatin Other (comments)     Leg cramps and swelling    Sulfa (Sulfonamide Antibiotics) Hives       PHQ Screening   3 most recent PHQ Screens 10/25/2022   Little interest or pleasure in doing things Not at all   Feeling down, depressed, irritable, or hopeless Not at all   Total Score PHQ 2 0       History  Past Medical History:   Diagnosis Date    Asthma     childhood diagnosis; occasional inhaler use    Chest pain     Diverticulitis     Dyslipidemia     \"borderline\" per pt. Endometriosis     GERD (gastroesophageal reflux disease)     Hypothyroidism     Normal nuclear stress test 09/14/2011    No ischemia or infarct. EF 75%. No WMA. Neg max EST w/nondiagnostic EKG changes and atypical chest discomfort at peak exercise.     Restless leg syndrome     Thyroid disease     Tobacco use disorder ongoing       Past Surgical History:   Procedure Laterality Date    FOOT/TOES SURGERY PROC UNLISTED      HX HYSTERECTOMY      HX HYSTEROSCOPY      HX OVARIAN CYST REMOVAL      teenager    HX TUBAL LIGATION      30 years ago       Social History     Socioeconomic History    Marital status:      Spouse name: Not on file    Number of children: Not on file    Years of education: Not on file    Highest education level: Not on file   Occupational History    Not on file   Tobacco Use    Smoking status: Every Day     Packs/day: 0.25     Years: 25.00     Pack years: 6.25     Types: Cigarettes    Smokeless tobacco: Never   Substance and Sexual Activity    Alcohol use: Yes     Comment: social    Drug use: No    Sexual activity: Yes     Partners: Male     Birth control/protection: None   Other Topics Concern    Not on file   Social History Narrative    Not on file     Social Determinants of Health     Financial Resource Strain: Not on file   Food Insecurity: Not on file   Transportation Needs: Not on file   Physical Activity: Not on file   Stress: Not on file   Social Connections: Not on file   Intimate Partner Violence: Not on file   Housing Stability: Not on file       Current Outpatient Medications   Medication Sig Dispense Refill    albuterol (PROVENTIL HFA, VENTOLIN HFA, PROAIR HFA) 90 mcg/actuation inhaler Take 2 Puffs by inhalation every four (4) hours as needed for Wheezing. 18 g 2    montelukast (SINGULAIR) 10 mg tablet Take 1 Tablet by mouth daily. Indications: controller medication for asthma 90 Tablet 1    valACYclovir (VALTREX) 1 gram tablet TAKE 1 TABLET BY MOUTH TWICE DAILY AS NEEDED 20 Tablet 3    ergocalciferol (ERGOCALCIFEROL) 1,250 mcg (50,000 unit) capsule TAKE 1 CAPSULE BY MOUTH EVERY 7 DAYS 12 Capsule 0    methocarbamoL (ROBAXIN) 500 mg tablet Take 1 tab by mouth BID-TID as needed for muscle spasm.  60 Tablet 1    fluticasone propionate (FLONASE) 50 mcg/actuation nasal spray SHAKE LIQUID AND USE 2 SPRAYS IN EACH NOSTRIL DAILY 48 g 1    levothyroxine (SYNTHROID) 112 mcg tablet TAKE 1 TABLET BY MOUTH EVERY DAY BEFORE BREAKFAST 90 Tab 0    methylPREDNISolone (MEDROL DOSEPACK) 4 mg tablet Take as directed on instructions. (Patient not taking: Reported on 10/25/2022) 1 Dose Pack 0    pregabalin (Lyrica) 75 mg capsule Take 1 cap by mouth in the morning and 2 at night as directed. (Patient not taking: Reported on 10/25/2022) 90 Capsule 1    pravastatin (PRAVACHOL) 40 mg tablet TAKE 1 TABLET BY MOUTH EVERY NIGHT (Patient not taking: Reported on 10/25/2022) 30 Tab 0    naltrexone-buPROPion (CONTRAVE) 8-90 mg TbER ER tablet Week 1 1 tab PO QAM, Week 2 1QAM 1QHS, Week 3 2QAM 1 QHS, Week 4 & beyond 2QAM 2QHS (Patient not taking: Reported on 10/25/2022) 162 Tab 0         Vitals:    10/25/22 1113   BP: (!) 144/68   Resp: 15   SpO2: 96%   Weight: 181 lb (82.1 kg)   Height: 5' 6\" (1.676 m)       Physical Exam  Vitals and nursing note reviewed. Constitutional:       General: She is not in acute distress. Appearance: Normal appearance. She is not ill-appearing, toxic-appearing or diaphoretic. HENT:      Head: Normocephalic and atraumatic. Nose: Congestion present. No rhinorrhea. Comments: Swollen turbinates with erythema     Mouth/Throat:      Pharynx: Oropharynx is clear. No oropharyngeal exudate or posterior oropharyngeal erythema. Eyes:      General: No scleral icterus. Extraocular Movements: Extraocular movements intact. Conjunctiva/sclera: Conjunctivae normal.      Pupils: Pupils are equal, round, and reactive to light. Cardiovascular:      Rate and Rhythm: Normal rate and regular rhythm. Pulses: Normal pulses. Heart sounds: No murmur heard. Pulmonary:      Effort: Pulmonary effort is normal. No respiratory distress. Breath sounds: Wheezing present. No rales. Abdominal:      General: Bowel sounds are normal. There is no distension. Palpations: Abdomen is soft. Tenderness: no abdominal tenderness There is no guarding. Musculoskeletal:         General: Normal range of motion. Cervical back: Normal range of motion. Right lower leg: No edema. Left lower leg: No edema. Skin:     General: Skin is warm and dry. Coloration: Skin is not jaundiced or pale. Neurological:      General: No focal deficit present. Mental Status: She is alert and oriented to person, place, and time. Mental status is at baseline. Cranial Nerves: No cranial nerve deficit. Motor: No weakness. Gait: Gait normal.   Psychiatric:         Mood and Affect: Mood normal.         Behavior: Behavior normal.         Thought Content: Thought content normal.         Judgment: Judgment normal.       No visits with results within 3 Month(s) from this visit. Latest known visit with results is:   Abstract on 01/18/2022   Component Date Value Ref Range Status    SARS-CoV-2, TAMMY 08/25/2021 Not Detected   Final       No results found for any visits on 10/25/22. Patient Care Team:  Patient Care Team:  Lynne Jim MD as PCP - General (Internal Medicine Physician)  Lynne Jim MD as PCP - [address] [place] [address] Provider  Martín Hull, RN as Nurse Rizwana Benitez MD (Surgery Physician)  Julio Cesar Price MD (Physical Medicine and Rehabilitation Physician)      Assessment / Plan:      ICD-10-CM ICD-9-CM    1. Moderate persistent asthma, unspecified whether complicated  J38.10 938.86 REFERRAL TO PULMONARY DISEASE      2. Elevated BP without diagnosis of hypertension  R03.0 796.2       3. Pulmonary emphysema, unspecified emphysema type ([facility]  J43.9 492.8 REFERRAL TO PULMONARY DISEASE      4. History of asthma  Z87.09 V12.69 albuterol (PROVENTIL HFA, VENTOLIN HFA, PROAIR HFA) 90 mcg/actuation inhaler      5. Needs flu shot  Z23 V04.81 INFLUENZA, FLUAD, (AGE 65 Y+), IM, PF, 0.5 ML          Asthma/COPD: Refill albuterol.   Patient reports chronic cough with recurrent infections. Will refer to pulmonology. Counseled on smoking cessation. Hypothyroidism: Stable. Continue Synthroid. Patient is following with Dr. Dipti Burch. Elevated BP: Recheck at next visit. Discussed vaccinations. Patient has received 1 dose of COVID-19 vaccine. She does not want to continue with the series. Does not want to get the shingles vaccine due to severe side effects experienced by her sister. Received flu shot today. Due for mammogram.  States that she will self schedule it. Follow-up and Dispositions    Return in about 4 months (around 2/25/2023) for BP check. I asked the patient if she  had any questions and answered her  questions. The patient stated that she understands the treatment plan and agrees with the treatment plan    This document was created with a voice activated dictation system and may contain transcription errors.

## 2022-11-04 ENCOUNTER — DOCUMENTATION ONLY (OUTPATIENT)
Dept: PULMONOLOGY | Age: 67
End: 2022-11-04

## 2023-02-16 ENCOUNTER — TELEPHONE (OUTPATIENT)
Facility: CLINIC | Age: 68
End: 2023-02-16

## 2023-02-16 DIAGNOSIS — E78.5 HYPERLIPIDEMIA, UNSPECIFIED HYPERLIPIDEMIA TYPE: ICD-10-CM

## 2023-02-16 DIAGNOSIS — E03.9 ACQUIRED HYPOTHYROIDISM: Primary | ICD-10-CM

## 2023-02-16 DIAGNOSIS — J43.9 PULMONARY EMPHYSEMA, UNSPECIFIED EMPHYSEMA TYPE (HCC): ICD-10-CM

## 2023-02-28 ENCOUNTER — OFFICE VISIT (OUTPATIENT)
Facility: CLINIC | Age: 68
End: 2023-02-28
Payer: MEDICARE

## 2023-02-28 VITALS
OXYGEN SATURATION: 95 % | WEIGHT: 177 LBS | BODY MASS INDEX: 28.45 KG/M2 | DIASTOLIC BLOOD PRESSURE: 64 MMHG | RESPIRATION RATE: 16 BRPM | HEART RATE: 69 BPM | HEIGHT: 66 IN | SYSTOLIC BLOOD PRESSURE: 126 MMHG

## 2023-02-28 DIAGNOSIS — E03.9 ACQUIRED HYPOTHYROIDISM: ICD-10-CM

## 2023-02-28 DIAGNOSIS — J45.30 MILD PERSISTENT ASTHMA WITHOUT COMPLICATION: ICD-10-CM

## 2023-02-28 DIAGNOSIS — M54.40 CHRONIC LOW BACK PAIN WITH SCIATICA, SCIATICA LATERALITY UNSPECIFIED, UNSPECIFIED BACK PAIN LATERALITY: ICD-10-CM

## 2023-02-28 DIAGNOSIS — G89.29 CHRONIC LOW BACK PAIN WITH SCIATICA, SCIATICA LATERALITY UNSPECIFIED, UNSPECIFIED BACK PAIN LATERALITY: ICD-10-CM

## 2023-02-28 DIAGNOSIS — B00.1 HERPES LABIALIS: ICD-10-CM

## 2023-02-28 DIAGNOSIS — Z87.891 PERSONAL HISTORY OF TOBACCO USE: ICD-10-CM

## 2023-02-28 DIAGNOSIS — R03.0 ELEVATED BLOOD-PRESSURE READING, WITHOUT DIAGNOSIS OF HYPERTENSION: ICD-10-CM

## 2023-02-28 DIAGNOSIS — J30.89 NON-SEASONAL ALLERGIC RHINITIS, UNSPECIFIED TRIGGER: Primary | ICD-10-CM

## 2023-02-28 PROCEDURE — 99213 OFFICE O/P EST LOW 20 MIN: CPT | Performed by: STUDENT IN AN ORGANIZED HEALTH CARE EDUCATION/TRAINING PROGRAM

## 2023-02-28 PROCEDURE — G8427 DOCREV CUR MEDS BY ELIG CLIN: HCPCS | Performed by: STUDENT IN AN ORGANIZED HEALTH CARE EDUCATION/TRAINING PROGRAM

## 2023-02-28 PROCEDURE — 3017F COLORECTAL CA SCREEN DOC REV: CPT | Performed by: STUDENT IN AN ORGANIZED HEALTH CARE EDUCATION/TRAINING PROGRAM

## 2023-02-28 PROCEDURE — G8417 CALC BMI ABV UP PARAM F/U: HCPCS | Performed by: STUDENT IN AN ORGANIZED HEALTH CARE EDUCATION/TRAINING PROGRAM

## 2023-02-28 PROCEDURE — G8484 FLU IMMUNIZE NO ADMIN: HCPCS | Performed by: STUDENT IN AN ORGANIZED HEALTH CARE EDUCATION/TRAINING PROGRAM

## 2023-02-28 PROCEDURE — G0296 VISIT TO DETERM LDCT ELIG: HCPCS | Performed by: STUDENT IN AN ORGANIZED HEALTH CARE EDUCATION/TRAINING PROGRAM

## 2023-02-28 PROCEDURE — 1123F ACP DISCUSS/DSCN MKR DOCD: CPT | Performed by: STUDENT IN AN ORGANIZED HEALTH CARE EDUCATION/TRAINING PROGRAM

## 2023-02-28 PROCEDURE — 1090F PRES/ABSN URINE INCON ASSESS: CPT | Performed by: STUDENT IN AN ORGANIZED HEALTH CARE EDUCATION/TRAINING PROGRAM

## 2023-02-28 PROCEDURE — G8399 PT W/DXA RESULTS DOCUMENT: HCPCS | Performed by: STUDENT IN AN ORGANIZED HEALTH CARE EDUCATION/TRAINING PROGRAM

## 2023-02-28 PROCEDURE — 4004F PT TOBACCO SCREEN RCVD TLK: CPT | Performed by: STUDENT IN AN ORGANIZED HEALTH CARE EDUCATION/TRAINING PROGRAM

## 2023-02-28 RX ORDER — ACETAMINOPHEN 500 MG
500 TABLET ORAL EVERY 4 HOURS PRN
COMMUNITY
Start: 2022-10-10

## 2023-02-28 RX ORDER — LEVOTHYROXINE SODIUM 0.1 MG/1
TABLET ORAL
COMMUNITY
Start: 2022-12-26

## 2023-02-28 RX ORDER — TRIAMCINOLONE ACETONIDE 1 MG/G
CREAM TOPICAL
COMMUNITY
Start: 2023-01-18

## 2023-02-28 RX ORDER — FLUTICASONE PROPIONATE 50 MCG
SPRAY, SUSPENSION (ML) NASAL
Qty: 16 G | Refills: 2 | Status: SHIPPED | OUTPATIENT
Start: 2023-02-28

## 2023-02-28 SDOH — ECONOMIC STABILITY: FOOD INSECURITY: WITHIN THE PAST 12 MONTHS, YOU WORRIED THAT YOUR FOOD WOULD RUN OUT BEFORE YOU GOT MONEY TO BUY MORE.: NEVER TRUE

## 2023-02-28 SDOH — ECONOMIC STABILITY: HOUSING INSECURITY
IN THE LAST 12 MONTHS, WAS THERE A TIME WHEN YOU DID NOT HAVE A STEADY PLACE TO SLEEP OR SLEPT IN A SHELTER (INCLUDING NOW)?: NO

## 2023-02-28 SDOH — ECONOMIC STABILITY: INCOME INSECURITY: HOW HARD IS IT FOR YOU TO PAY FOR THE VERY BASICS LIKE FOOD, HOUSING, MEDICAL CARE, AND HEATING?: NOT HARD AT ALL

## 2023-02-28 SDOH — ECONOMIC STABILITY: FOOD INSECURITY: WITHIN THE PAST 12 MONTHS, THE FOOD YOU BOUGHT JUST DIDN'T LAST AND YOU DIDN'T HAVE MONEY TO GET MORE.: NEVER TRUE

## 2023-02-28 ASSESSMENT — ANXIETY QUESTIONNAIRES
3. WORRYING TOO MUCH ABOUT DIFFERENT THINGS: 0
2. NOT BEING ABLE TO STOP OR CONTROL WORRYING: 0
7. FEELING AFRAID AS IF SOMETHING AWFUL MIGHT HAPPEN: 0
4. TROUBLE RELAXING: 0
5. BEING SO RESTLESS THAT IT IS HARD TO SIT STILL: 1
1. FEELING NERVOUS, ANXIOUS, OR ON EDGE: 1
GAD7 TOTAL SCORE: 2
6. BECOMING EASILY ANNOYED OR IRRITABLE: 0
IF YOU CHECKED OFF ANY PROBLEMS ON THIS QUESTIONNAIRE, HOW DIFFICULT HAVE THESE PROBLEMS MADE IT FOR YOU TO DO YOUR WORK, TAKE CARE OF THINGS AT HOME, OR GET ALONG WITH OTHER PEOPLE: NOT DIFFICULT AT ALL

## 2023-02-28 ASSESSMENT — ENCOUNTER SYMPTOMS
BACK PAIN: 1
VOMITING: 0
DIARRHEA: 0
COUGH: 0
RHINORRHEA: 0
ABDOMINAL PAIN: 0
CHEST TIGHTNESS: 0
NAUSEA: 0
BLOOD IN STOOL: 0
SHORTNESS OF BREATH: 0
WHEEZING: 0

## 2023-02-28 ASSESSMENT — PATIENT HEALTH QUESTIONNAIRE - PHQ9
2. FEELING DOWN, DEPRESSED OR HOPELESS: 0
1. LITTLE INTEREST OR PLEASURE IN DOING THINGS: 0
SUM OF ALL RESPONSES TO PHQ QUESTIONS 1-9: 0
SUM OF ALL RESPONSES TO PHQ9 QUESTIONS 1 & 2: 0
SUM OF ALL RESPONSES TO PHQ QUESTIONS 1-9: 0

## 2023-02-28 NOTE — PATIENT INSTRUCTIONS

## 2023-02-28 NOTE — PROGRESS NOTES
Candace Arizmendi is a 80 y.o. female presenting today for Follow-up Chronic Condition (Recently had a sinus infection and was seen about 2 months ago, was given amoxicillin and now has a break out on top lip. Denies any other concerns. Has not been taking cholesterol meds, has not had labs, is not fasting due to creamer in coffee. )  . Chief Complaint   Patient presents with    Follow-up Chronic Condition     Recently had a sinus infection and was seen about 2 months ago, was given amoxicillin and now has a break out on top lip. Denies any other concerns. Has not been taking cholesterol meds, has not had labs, is not fasting due to creamer in coffee. HPI:  Paty He presents to the office today for follow up. Patient has a past medical history of HLD, hypothyroidism, vitamin D deficiency. Patient has had recurrent respiratory infections requiring visit to the ED. she recently had sinusitis and was prescribed amoxicillin with Medrol dose pack after which she had a cold sore breakout on her top lip. She was prescribed Valtrex. Patient is a smoker. She smokes 5 cigs/day. CT lung cancer screening in 6/2021 showed small 2 to 3 mm pulmonary nodules, mild emphysema. Patient has a history of allergies. She takes Flonase for allergic rhinitis-she has not been taking this recently. She has mild asthma for which she takes albuterol as needed. Denies any shortness of breath or nighttime symptoms. Reports occasional wheezing. Uses the albuterol inhaler twice in a week. She has tried anti-histamines with no significant improvement. HLD: Lipid panel in 11/21 showed triglyceride 88, HDL 63 and .4. Hypothyroidism: TSH in 12/20 was 5. 18. Continue taking Synthroid  100 mcg daily. Follows with Dr. Pelayo. Lumbar stenosis: Patient follows with Dr. Omayra Rolle. She takes ibuprofen as needed with relief. Was previously prescribed Lyrica but stopped taking it due to side effects.   Pain improves with ibuprofen as needed. MRI lumbar spine in 2/22 showed multilevel progressive degenerative disc disease severe left L5-S1 neural foraminal narrowing. Repeat labs are pending. Review of Systems   Constitutional:  Negative for activity change, appetite change, chills, diaphoresis, fatigue, fever and unexpected weight change. HENT:  Positive for postnasal drip. Negative for congestion, nosebleeds and rhinorrhea. Respiratory:  Negative for cough, chest tightness, shortness of breath and wheezing. Cardiovascular:  Negative for chest pain and leg swelling. Gastrointestinal:  Negative for abdominal pain, blood in stool, diarrhea, nausea and vomiting. Endocrine: Negative for polydipsia and polyphagia. Genitourinary:  Negative for decreased urine volume, dysuria, frequency and pelvic pain. Musculoskeletal:  Positive for back pain. Negative for myalgias and neck pain. Skin:  Positive for rash. Allergic/Immunologic: Positive for environmental allergies. Neurological:  Positive for numbness. Negative for dizziness, tremors, seizures, syncope, speech difficulty, weakness and headaches. Psychiatric/Behavioral:  Negative for agitation and confusion. The patient is not nervous/anxious. All other systems reviewed and are negative. Allergies   Allergen Reactions    Atorvastatin Other (See Comments)     Leg cramps and swelling    Sulfa Antibiotics Hives     Other reaction(s): rash/itching       PHQ Screening   No flowsheet data found. History  Past Medical History:   Diagnosis Date    Asthma     childhood diagnosis; occasional inhaler use    Chest pain     Diverticulitis     Dyslipidemia     \"borderline\" per pt.     Endometriosis     GERD (gastroesophageal reflux disease)     Hypothyroidism     Restless leg syndrome     Thyroid disease     Tobacco use disorder     ongoing       Past Surgical History:   Procedure Laterality Date    FOOT/TOES SURGERY PROC UNLISTED      HYSTERECTOMY (CERVIX STATUS UNKNOWN)      HYSTEROSCOPY      OVARIAN CYST REMOVAL      teenager    TUBAL LIGATION      30 years ago       Social History     Socioeconomic History    Marital status:      Spouse name: Not on file    Number of children: Not on file    Years of education: Not on file    Highest education level: Not on file   Occupational History    Not on file   Tobacco Use    Smoking status: Every Day     Packs/day: 0.25     Years: 37.00     Pack years: 9.25     Types: Cigarettes    Smokeless tobacco: Never   Vaping Use    Vaping Use: Never used   Substance and Sexual Activity    Alcohol use: Yes    Drug use: No    Sexual activity: Not on file   Other Topics Concern    Not on file   Social History Narrative    Not on file     Social Determinants of Health     Financial Resource Strain: Low Risk     Difficulty of Paying Living Expenses: Not hard at all   Food Insecurity: No Food Insecurity    Worried About Running Out of Food in the Last Year: Never true    [address] in the Last Year: Never true   Transportation Needs: Unknown    Lack of Transportation (Medical): Not on file    Lack of Transportation (Non-Medical):  No   Physical Activity: Not on file   Stress: Not on file   Social Connections: Not on file   Intimate Partner Violence: Not on file   Housing Stability: Unknown    Unable to Pay for Housing in the Last Year: Not on file    Number of Places Lived in the Last Year: Not on file    Unstable Housing in the Last Year: No       Current Outpatient Medications   Medication Sig Dispense Refill    Prenatal Vit-Fe Fumarate-FA (M-VIT PO) Take by mouth      acetaminophen (TYLENOL) 500 MG tablet Take 500 mg by mouth every 4 hours as needed      levothyroxine (SYNTHROID) 100 MCG tablet TAKE 1 TABLET BY MOUTH EVERY DAY IN THE MORNING ON AN EMPTY STOMACH      mupirocin (BACTROBAN) 2 % ointment APPLY TOPICALLY TO THE AFFECTED AREA TWICE DAILY FOR 7 DAYS      triamcinolone (KENALOG) 0.1 % cream APPLY TOPICALLY TO THE AFFECTED AREA TWICE DAILY FOR 7 DAYS      fluticasone (FLONASE) 50 MCG/ACT nasal spray SHAKE LIQUID AND USE 2 SPRAYS IN EACH NOSTRIL DAILY 16 g 2    albuterol sulfate HFA (PROVENTIL;VENTOLIN;PROAIR) 108 (90 Base) MCG/ACT inhaler Inhale 2 puffs into the lungs every 4 hours as needed      ergocalciferol (ERGOCALCIFEROL) 1.25 MG (96088 UT) capsule TAKE 1 CAPSULE BY MOUTH EVERY 7 DAYS      methocarbamol (ROBAXIN) 500 MG tablet Take 1 tab by mouth BID-TID as needed for muscle spasm. montelukast (SINGULAIR) 10 MG tablet Take 10 mg by mouth daily      valACYclovir (VALTREX) 1 g tablet TAKE 1 TABLET BY MOUTH TWICE DAILY AS NEEDED       No current facility-administered medications for this visit. /64   Pulse 69   Resp 16   Ht 5' 6\" (1.676 m)   Wt 177 lb (80.3 kg)   SpO2 95%   BMI 28.57 kg/m²      Physical Exam  Vitals and nursing note reviewed. Constitutional:       General: She is not in acute distress. Appearance: Normal appearance. She is not ill-appearing, toxic-appearing or diaphoretic. HENT:      Head: Normocephalic and atraumatic. Eyes:      General: No scleral icterus. Extraocular Movements: Extraocular movements intact. Conjunctiva/sclera: Conjunctivae normal.      Pupils: Pupils are equal, round, and reactive to light. Cardiovascular:      Rate and Rhythm: Normal rate and regular rhythm. Pulses: Normal pulses. Heart sounds: Normal heart sounds. No murmur heard. Pulmonary:      Effort: Pulmonary effort is normal. No respiratory distress. Breath sounds: Normal breath sounds. No wheezing or rales. Musculoskeletal:         General: Normal range of motion. Cervical back: Normal range of motion and neck supple. Right lower leg: No edema. Left lower leg: No edema. Skin:     General: Skin is warm and dry. Findings: Rash present. Comments: Rash above upper lip. Neurological:      General: No focal deficit present.       Mental Status: She is alert and oriented to person, place, and time. Mental status is at baseline. Cranial Nerves: No cranial nerve deficit. Motor: No weakness. Gait: Gait normal.   Psychiatric:         Mood and Affect: Mood normal.         Behavior: Behavior normal.         Thought Content: Thought content normal.         Judgment: Judgment normal.        No visits with results within 3 Month(s) from this visit. Latest known visit with results is:   Abstract on 01/18/2022   Component Date Value Ref Range Status    SARS-CoV-2, ALTAGRACIA 08/25/2021 Not Detected   Final       No results found for any visits on 02/28/23. Patient Care Team:  Patient Care Team:  Rebecca Lewis MD as PCP - Jarret Smith MD as PCP - [facility] Provider      Assessment / Plan:     Diagnosis Orders   1. Non-seasonal allergic rhinitis, unspecified trigger  fluticasone (FLONASE) 50 MCG/ACT nasal spray      2. Acquired hypothyroidism        3. Elevated blood-pressure reading, without diagnosis of hypertension        4. Mild persistent asthma without complication        5. Personal history of tobacco use  MT VISIT TO DISCUSS LUNG CA SCREEN W LDCT    CT Lung Screen (Annual/Baseline)      6. Herpes labialis        7. Chronic low back pain with sciatica, sciatica laterality unspecified, unspecified back pain laterality               Asthma/COPD: Continue albuterol as needed. CT lung cancer screen ordered. Counseled on smoking cessation. Herpes labialis: Appears to be healing. Hypothyroidism: Stable. Continue Synthroid. Patient is following with Dr. Grady Roland. Allergic rhinitis: Refilled Flonase. Elevated BP: Improved today on rechecking. Chronic back pain: Continue ibuprofen as needed. Discussed vaccinations. Patient has received 1 dose of COVID-19 vaccine. She does not want to continue with the series.   Does not want to get the shingles vaccine due to severe side effects experienced by her sister.    Patient advised to consider PCV 20 vaccine.  She is hesitant due to side effects.Due for mammogram.  States that she will self schedule it.     Next visit: Medicare wellness, PCV 20 vaccine    Return in about 8 weeks (around 4/25/2023) for Medicare Wellness.     I asked the patient if she  had any questions and answered her  questions.  The patient stated that she understands the treatment plan and agrees with the treatment plan    This document was created with a voice activated dictation system and may contain transcription errors.     Discussed with the patient the current USPSTF guidelines released March 9, 2021 for screening for lung cancer.    For adults aged 50 to 80 years who have a 20 pack-year smoking history and currently smoke or have quit within the past 15 years the grade B recommendation is to:  Screen for lung cancer with low-dose computed tomography (LDCT) every year.  Stop screening once a person has not smoked for 15 years or has a health problem that limits life expectancy or the ability to have lung surgery.    The patient  reports that she has been smoking cigarettes. She has a 33 pack-year smoking history. She has never used smokeless tobacco.. Discussed with patient the risks and benefits of screening, including over-diagnosis, false positive rate, and total radiation exposure.  The patient currently exhibits no signs or symptoms suggestive of lung cancer.  Discussed with patient the importance of compliance with yearly annual lung cancer screenings and willingness to undergo diagnosis and treatment if screening scan is positive.  In addition, the patient was counseled regarding the importance of remaining smoke free and/or total smoking cessation.    Also reviewed the following if the patient has Medicare that as of February 10, 2022, Medicare only covers LDCT screening in patients aged 50-77 with at least a 20 pack-year smoking history who currently smoke or have quit in the last  15 years

## 2023-03-23 DIAGNOSIS — J45.30 MILD PERSISTENT ASTHMA WITHOUT COMPLICATION: Primary | ICD-10-CM

## 2023-03-23 RX ORDER — MONTELUKAST SODIUM 10 MG/1
TABLET ORAL
Qty: 90 TABLET | Refills: 1 | Status: SHIPPED | OUTPATIENT
Start: 2023-03-23

## 2023-03-27 ENCOUNTER — HOSPITAL ENCOUNTER (OUTPATIENT)
Facility: HOSPITAL | Age: 68
Discharge: HOME | End: 2023-03-30

## 2023-03-27 DIAGNOSIS — Z87.891 PERSONAL HISTORY OF TOBACCO USE: ICD-10-CM

## 2023-05-08 ENCOUNTER — TRANSCRIBE ORDERS (OUTPATIENT)
Facility: HOSPITAL | Age: 68
End: 2023-05-08

## 2023-05-08 DIAGNOSIS — Z12.31 VISIT FOR SCREENING MAMMOGRAM: Primary | ICD-10-CM

## 2023-05-10 ENCOUNTER — HOSPITAL ENCOUNTER (OUTPATIENT)
Facility: HOSPITAL | Age: 68
Discharge: HOME | End: 2023-05-13
Payer: MEDICARE

## 2023-05-10 LAB
ALBUMIN SERPL-MCNC: 3.8 G/DL (ref 3.4–5)
ALBUMIN/GLOB SERPL: 1.5 (ref 0.8–1.7)
ALP SERPL-CCNC: 61 U/L (ref 45–117)
ALT SERPL-CCNC: 14 U/L (ref 13–56)
ANION GAP SERPL CALC-SCNC: 5 MMOL/L (ref 3–18)
AST SERPL-CCNC: 7 U/L (ref 10–38)
BASOPHILS # BLD: 0.1 K/UL (ref 0–0.1)
BASOPHILS NFR BLD: 1 % (ref 0–2)
BILIRUB SERPL-MCNC: 0.6 MG/DL (ref 0.2–1)
BUN SERPL-MCNC: 11 MG/DL (ref 7–18)
BUN/CREAT SERPL: 16 (ref 12–20)
CALCIUM SERPL-MCNC: 9.4 MG/DL (ref 8.5–10.1)
CHLORIDE SERPL-SCNC: 107 MMOL/L (ref 100–111)
CHOLEST SERPL-MCNC: 203 MG/DL
CO2 SERPL-SCNC: 28 MMOL/L (ref 21–32)
CREAT SERPL-MCNC: 0.67 MG/DL (ref 0.6–1.3)
DIFFERENTIAL METHOD BLD: ABNORMAL
EOSINOPHIL # BLD: 0.1 K/UL (ref 0–0.4)
EOSINOPHIL NFR BLD: 2 % (ref 0–5)
ERYTHROCYTE [DISTWIDTH] IN BLOOD BY AUTOMATED COUNT: 12.1 % (ref 11.6–14.5)
GLOBULIN SER CALC-MCNC: 2.5 G/DL (ref 2–4)
GLUCOSE SERPL-MCNC: 97 MG/DL (ref 74–99)
HCT VFR BLD AUTO: 41.7 % (ref 35–45)
HDLC SERPL-MCNC: 56 MG/DL (ref 40–60)
HDLC SERPL: 3.6 (ref 0–5)
HGB BLD-MCNC: 13.7 G/DL (ref 12–16)
IMM GRANULOCYTES # BLD AUTO: 0 K/UL (ref 0–0.04)
IMM GRANULOCYTES NFR BLD AUTO: 0 % (ref 0–0.5)
LDLC SERPL CALC-MCNC: 129 MG/DL (ref 0–100)
LIPID PANEL: ABNORMAL
LYMPHOCYTES # BLD: 1.7 K/UL (ref 0.9–3.6)
LYMPHOCYTES NFR BLD: 24 % (ref 21–52)
MCH RBC QN AUTO: 31.9 PG (ref 24–34)
MCHC RBC AUTO-ENTMCNC: 32.9 G/DL (ref 31–37)
MCV RBC AUTO: 97 FL (ref 78–100)
MONOCYTES # BLD: 0.4 K/UL (ref 0.05–1.2)
MONOCYTES NFR BLD: 6 % (ref 3–10)
NEUTS SEG # BLD: 4.7 K/UL (ref 1.8–8)
NEUTS SEG NFR BLD: 67 % (ref 40–73)
NRBC # BLD: 0 K/UL (ref 0–0.01)
NRBC BLD-RTO: 0 PER 100 WBC
PLATELET # BLD AUTO: 256 K/UL (ref 135–420)
PMV BLD AUTO: 9 FL (ref 9.2–11.8)
POTASSIUM SERPL-SCNC: 4 MMOL/L (ref 3.5–5.5)
PROT SERPL-MCNC: 6.3 G/DL (ref 6.4–8.2)
RBC # BLD AUTO: 4.3 M/UL (ref 4.2–5.3)
SODIUM SERPL-SCNC: 140 MMOL/L (ref 136–145)
TRIGL SERPL-MCNC: 90 MG/DL
VLDLC SERPL CALC-MCNC: 18 MG/DL
WBC # BLD AUTO: 7 K/UL (ref 4.6–13.2)

## 2023-05-10 PROCEDURE — 36415 COLL VENOUS BLD VENIPUNCTURE: CPT

## 2023-05-10 PROCEDURE — 80061 LIPID PANEL: CPT

## 2023-05-10 PROCEDURE — 85025 COMPLETE CBC W/AUTO DIFF WBC: CPT

## 2023-05-10 PROCEDURE — 80053 COMPREHEN METABOLIC PANEL: CPT

## 2023-05-23 ENCOUNTER — OFFICE VISIT (OUTPATIENT)
Facility: CLINIC | Age: 68
End: 2023-05-23
Payer: MEDICARE

## 2023-05-23 VITALS
HEART RATE: 69 BPM | DIASTOLIC BLOOD PRESSURE: 54 MMHG | WEIGHT: 178 LBS | HEIGHT: 66 IN | OXYGEN SATURATION: 95 % | TEMPERATURE: 98.3 F | RESPIRATION RATE: 14 BRPM | BODY MASS INDEX: 28.61 KG/M2 | SYSTOLIC BLOOD PRESSURE: 135 MMHG

## 2023-05-23 DIAGNOSIS — E03.9 ACQUIRED HYPOTHYROIDISM: ICD-10-CM

## 2023-05-23 DIAGNOSIS — J43.9 PULMONARY EMPHYSEMA, UNSPECIFIED EMPHYSEMA TYPE (HCC): ICD-10-CM

## 2023-05-23 DIAGNOSIS — J30.89 NON-SEASONAL ALLERGIC RHINITIS, UNSPECIFIED TRIGGER: ICD-10-CM

## 2023-05-23 DIAGNOSIS — E78.5 HYPERLIPIDEMIA, UNSPECIFIED HYPERLIPIDEMIA TYPE: ICD-10-CM

## 2023-05-23 DIAGNOSIS — Z00.00 MEDICARE ANNUAL WELLNESS VISIT, SUBSEQUENT: Primary | ICD-10-CM

## 2023-05-23 PROCEDURE — 1123F ACP DISCUSS/DSCN MKR DOCD: CPT | Performed by: STUDENT IN AN ORGANIZED HEALTH CARE EDUCATION/TRAINING PROGRAM

## 2023-05-23 PROCEDURE — 3017F COLORECTAL CA SCREEN DOC REV: CPT | Performed by: STUDENT IN AN ORGANIZED HEALTH CARE EDUCATION/TRAINING PROGRAM

## 2023-05-23 PROCEDURE — G0439 PPPS, SUBSEQ VISIT: HCPCS | Performed by: STUDENT IN AN ORGANIZED HEALTH CARE EDUCATION/TRAINING PROGRAM

## 2023-05-23 ASSESSMENT — LIFESTYLE VARIABLES
HOW MANY STANDARD DRINKS CONTAINING ALCOHOL DO YOU HAVE ON A TYPICAL DAY: 1 OR 2
HOW OFTEN DO YOU HAVE A DRINK CONTAINING ALCOHOL: MONTHLY OR LESS

## 2023-05-23 ASSESSMENT — ANXIETY QUESTIONNAIRES
4. TROUBLE RELAXING: 0
5. BEING SO RESTLESS THAT IT IS HARD TO SIT STILL: 0
6. BECOMING EASILY ANNOYED OR IRRITABLE: 0
3. WORRYING TOO MUCH ABOUT DIFFERENT THINGS: 0
GAD7 TOTAL SCORE: 0
7. FEELING AFRAID AS IF SOMETHING AWFUL MIGHT HAPPEN: 0
1. FEELING NERVOUS, ANXIOUS, OR ON EDGE: 0
2. NOT BEING ABLE TO STOP OR CONTROL WORRYING: 0

## 2023-05-23 NOTE — PROGRESS NOTES
Jenifer He presents today for   Chief Complaint   Patient presents with    Medicare AWV       Vitals:    05/23/23 1023 05/23/23 1033   BP: (!) 124/47 (!) 129/51   Site: Right Upper Arm Left Upper Arm   Position: Sitting Sitting   Pulse: 69    Resp: 14    Temp: 98.3 °F (36.8 °C)    TempSrc: Oral    SpO2: 95%    Weight: 178 lb (80.7 kg)    Height: 5' 6\" (1.676 m)         Is someone accompanying this pt? no    Is the patient using any DME equipment during OV? no    Depression Screening:  PHQ-9 Questionaire 2/28/2023   Little interest or pleasure in doing things 0   Feeling down, depressed, or hopeless 0   PHQ-9 Total Score 0       Abuse Screening: AMB Abuse Screening 5/23/2023   Do you ever feel afraid of your partner? N   Are you in a relationship with someone who physically or mentally threatens you? N   Is it safe for you to go home? Y       Fall Screening  Fall Risk 5/23/2023   2 or more falls in past year? no   Fall with injury in past year? no       Generalized Anxiety  KASSIDY-7 SCREENING 5/23/2023 2/28/2023   Feeling nervous, anxious, or on edge Not at all Several days   Not being able to stop or control worrying Not at all Not at all   Worrying too much about different things Not at all Not at all   Trouble relaxing Not at all Not at all   Being so restless that it is hard to sit still Not at all Several days   Becoming easily annoyed or irritable Not at all Not at all   Feeling afraid as if something awful might happen Not at all Not at all   KASSIDY-7 Total Score 0 2   How difficult have these problems made it for you to do your work, take care of things at home, or get along with other people? - Not difficult at all        Health Maintenance Due   Topic Date Due    COVID-19 Vaccine (1) Never done    Pneumococcal 65+ years Vaccine (1 - PCV) Never done    DTaP/Tdap/Td vaccine (2 - Td or Tdap) 06/04/2022    Annual Wellness Visit (AWV)  12/17/2022    Breast cancer screen  04/19/2023   .       Health Maintenance
E-cigarette/Vaping       Questions Responses    E-cigarette/Vaping Use Never User    Start Date     Passive Exposure No    Quit Date     Counseling Given No    Comments         Interventions:  See counseling/recommendations below                        Objective   Vitals:    05/23/23 1023 05/23/23 1033 05/23/23 1055   BP: (!) 124/47 (!) 129/51 (!) 135/54   Site: Right Upper Arm Left Upper Arm    Position: Sitting Sitting    Pulse: 69     Resp: 14     Temp: 98.3 °F (36.8 °C)     TempSrc: Oral     SpO2: 95%     Weight: 178 lb (80.7 kg)     Height: 5' 6\" (1.676 m)        Body mass index is 28.73 kg/m². Allergies   Allergen Reactions    Atorvastatin Other (See Comments)     Leg cramps and swelling    Sulfa Antibiotics Hives     Other reaction(s): rash/itching     Prior to Visit Medications    Medication Sig Taking?  Authorizing Provider   montelukast (SINGULAIR) 10 MG tablet TAKE 1 TABLET BY MOUTH DAILY FOR ASTHMA Yes Paola Claudio MD   Prenatal Vit-Fe Fumarate-FA (M-VIT PO) Take by mouth Yes Historical Provider, MD   acetaminophen (TYLENOL) 500 MG tablet Take 1 tablet by mouth every 4 hours as needed Yes Historical Provider, MD   levothyroxine (SYNTHROID) 100 MCG tablet TAKE 1 TABLET BY MOUTH EVERY DAY IN THE MORNING ON AN EMPTY STOMACH Yes Historical Provider, MD   mupirocin (BACTROBAN) 2 % ointment APPLY TOPICALLY TO THE AFFECTED AREA TWICE DAILY FOR 7 DAYS Yes Historical Provider, MD   triamcinolone (KENALOG) 0.1 % cream APPLY TOPICALLY TO THE AFFECTED AREA TWICE DAILY FOR 7 DAYS Yes Historical Provider, MD   fluticasone (FLONASE) 50 MCG/ACT nasal spray SHAKE LIQUID AND USE 2 SPRAYS IN EACH NOSTRIL DAILY Yes Regla Hernandez MD   albuterol sulfate HFA (PROVENTIL;VENTOLIN;PROAIR) 108 (90 Base) MCG/ACT inhaler Inhale 2 puffs into the lungs every 4 hours as needed Yes Ar Automatic Reconciliation   ergocalciferol (ERGOCALCIFEROL) 1.25 MG (03282 UT) capsule TAKE 1 CAPSULE BY MOUTH EVERY 7 DAYS Yes Ar

## 2023-05-31 ENCOUNTER — HOSPITAL ENCOUNTER (OUTPATIENT)
Facility: HOSPITAL | Age: 68
Discharge: HOME | End: 2023-06-03
Payer: MEDICARE

## 2023-05-31 DIAGNOSIS — Z12.31 VISIT FOR SCREENING MAMMOGRAM: ICD-10-CM

## 2023-05-31 PROCEDURE — 77063 BREAST TOMOSYNTHESIS BI: CPT

## 2023-06-20 DIAGNOSIS — J30.89 NON-SEASONAL ALLERGIC RHINITIS, UNSPECIFIED TRIGGER: ICD-10-CM

## 2023-06-20 RX ORDER — FLUTICASONE PROPIONATE 50 MCG
SPRAY, SUSPENSION (ML) NASAL
Qty: 16 G | Refills: 2 | Status: SHIPPED | OUTPATIENT
Start: 2023-06-20

## 2023-06-20 NOTE — TELEPHONE ENCOUNTER
Requested Prescriptions     Pending Prescriptions Disp Refills    fluticasone (FLONASE) 50 MCG/ACT nasal spray 16 g 2     Sig: SHAKE LIQUID AND USE 2 SPRAYS IN EACH NOSTRIL DAILY

## 2023-12-11 ENCOUNTER — TELEPHONE (OUTPATIENT)
Facility: CLINIC | Age: 68
End: 2023-12-11

## 2023-12-11 DIAGNOSIS — B00.1 HERPES LABIALIS: Primary | ICD-10-CM

## 2023-12-11 RX ORDER — VALACYCLOVIR HYDROCHLORIDE 1 G/1
TABLET, FILM COATED ORAL
Qty: 30 TABLET | Refills: 1 | Status: SHIPPED | OUTPATIENT
Start: 2023-12-11

## 2024-01-16 ENCOUNTER — OFFICE VISIT (OUTPATIENT)
Facility: CLINIC | Age: 69
End: 2024-01-16
Payer: MEDICARE

## 2024-01-16 VITALS
DIASTOLIC BLOOD PRESSURE: 70 MMHG | TEMPERATURE: 98.3 F | RESPIRATION RATE: 16 BRPM | HEIGHT: 66 IN | HEART RATE: 67 BPM | OXYGEN SATURATION: 93 % | BODY MASS INDEX: 28.77 KG/M2 | SYSTOLIC BLOOD PRESSURE: 132 MMHG | WEIGHT: 179 LBS

## 2024-01-16 DIAGNOSIS — J06.9 VIRAL URI: ICD-10-CM

## 2024-01-16 DIAGNOSIS — E78.5 HYPERLIPIDEMIA, UNSPECIFIED HYPERLIPIDEMIA TYPE: ICD-10-CM

## 2024-01-16 DIAGNOSIS — B00.1 HERPES LABIALIS: Primary | ICD-10-CM

## 2024-01-16 DIAGNOSIS — E03.9 ACQUIRED HYPOTHYROIDISM: ICD-10-CM

## 2024-01-16 DIAGNOSIS — M54.40 CHRONIC LOW BACK PAIN WITH SCIATICA, SCIATICA LATERALITY UNSPECIFIED, UNSPECIFIED BACK PAIN LATERALITY: ICD-10-CM

## 2024-01-16 DIAGNOSIS — G89.29 CHRONIC LOW BACK PAIN WITH SCIATICA, SCIATICA LATERALITY UNSPECIFIED, UNSPECIFIED BACK PAIN LATERALITY: ICD-10-CM

## 2024-01-16 DIAGNOSIS — R03.0 ELEVATED BLOOD-PRESSURE READING, WITHOUT DIAGNOSIS OF HYPERTENSION: ICD-10-CM

## 2024-01-16 DIAGNOSIS — J43.9 PULMONARY EMPHYSEMA, UNSPECIFIED EMPHYSEMA TYPE (HCC): ICD-10-CM

## 2024-01-16 DIAGNOSIS — Z87.891 PERSONAL HISTORY OF TOBACCO USE: ICD-10-CM

## 2024-01-16 PROCEDURE — G8484 FLU IMMUNIZE NO ADMIN: HCPCS | Performed by: STUDENT IN AN ORGANIZED HEALTH CARE EDUCATION/TRAINING PROGRAM

## 2024-01-16 PROCEDURE — 1090F PRES/ABSN URINE INCON ASSESS: CPT | Performed by: STUDENT IN AN ORGANIZED HEALTH CARE EDUCATION/TRAINING PROGRAM

## 2024-01-16 PROCEDURE — 99214 OFFICE O/P EST MOD 30 MIN: CPT | Performed by: STUDENT IN AN ORGANIZED HEALTH CARE EDUCATION/TRAINING PROGRAM

## 2024-01-16 PROCEDURE — G8417 CALC BMI ABV UP PARAM F/U: HCPCS | Performed by: STUDENT IN AN ORGANIZED HEALTH CARE EDUCATION/TRAINING PROGRAM

## 2024-01-16 PROCEDURE — G8427 DOCREV CUR MEDS BY ELIG CLIN: HCPCS | Performed by: STUDENT IN AN ORGANIZED HEALTH CARE EDUCATION/TRAINING PROGRAM

## 2024-01-16 PROCEDURE — 3023F SPIROM DOC REV: CPT | Performed by: STUDENT IN AN ORGANIZED HEALTH CARE EDUCATION/TRAINING PROGRAM

## 2024-01-16 PROCEDURE — 3017F COLORECTAL CA SCREEN DOC REV: CPT | Performed by: STUDENT IN AN ORGANIZED HEALTH CARE EDUCATION/TRAINING PROGRAM

## 2024-01-16 PROCEDURE — 1123F ACP DISCUSS/DSCN MKR DOCD: CPT | Performed by: STUDENT IN AN ORGANIZED HEALTH CARE EDUCATION/TRAINING PROGRAM

## 2024-01-16 PROCEDURE — G8399 PT W/DXA RESULTS DOCUMENT: HCPCS | Performed by: STUDENT IN AN ORGANIZED HEALTH CARE EDUCATION/TRAINING PROGRAM

## 2024-01-16 PROCEDURE — 4004F PT TOBACCO SCREEN RCVD TLK: CPT | Performed by: STUDENT IN AN ORGANIZED HEALTH CARE EDUCATION/TRAINING PROGRAM

## 2024-01-16 SDOH — ECONOMIC STABILITY: FOOD INSECURITY: WITHIN THE PAST 12 MONTHS, YOU WORRIED THAT YOUR FOOD WOULD RUN OUT BEFORE YOU GOT MONEY TO BUY MORE.: NEVER TRUE

## 2024-01-16 SDOH — ECONOMIC STABILITY: INCOME INSECURITY: HOW HARD IS IT FOR YOU TO PAY FOR THE VERY BASICS LIKE FOOD, HOUSING, MEDICAL CARE, AND HEATING?: NOT HARD AT ALL

## 2024-01-16 SDOH — ECONOMIC STABILITY: FOOD INSECURITY: WITHIN THE PAST 12 MONTHS, THE FOOD YOU BOUGHT JUST DIDN'T LAST AND YOU DIDN'T HAVE MONEY TO GET MORE.: NEVER TRUE

## 2024-01-16 ASSESSMENT — ANXIETY QUESTIONNAIRES
7. FEELING AFRAID AS IF SOMETHING AWFUL MIGHT HAPPEN: 0
2. NOT BEING ABLE TO STOP OR CONTROL WORRYING: 0
6. BECOMING EASILY ANNOYED OR IRRITABLE: 0
5. BEING SO RESTLESS THAT IT IS HARD TO SIT STILL: 0
IF YOU CHECKED OFF ANY PROBLEMS ON THIS QUESTIONNAIRE, HOW DIFFICULT HAVE THESE PROBLEMS MADE IT FOR YOU TO DO YOUR WORK, TAKE CARE OF THINGS AT HOME, OR GET ALONG WITH OTHER PEOPLE: NOT DIFFICULT AT ALL
3. WORRYING TOO MUCH ABOUT DIFFERENT THINGS: 0
1. FEELING NERVOUS, ANXIOUS, OR ON EDGE: 0
GAD7 TOTAL SCORE: 0
4. TROUBLE RELAXING: 0

## 2024-01-16 ASSESSMENT — ENCOUNTER SYMPTOMS
ABDOMINAL PAIN: 0
BLOOD IN STOOL: 0
SHORTNESS OF BREATH: 0
CHEST TIGHTNESS: 0
DIARRHEA: 0
COUGH: 0
NAUSEA: 0
VOMITING: 0
WHEEZING: 0
BACK PAIN: 1
RHINORRHEA: 0

## 2024-01-16 ASSESSMENT — PATIENT HEALTH QUESTIONNAIRE - PHQ9
2. FEELING DOWN, DEPRESSED OR HOPELESS: 0
1. LITTLE INTEREST OR PLEASURE IN DOING THINGS: 0
SUM OF ALL RESPONSES TO PHQ9 QUESTIONS 1 & 2: 0
SUM OF ALL RESPONSES TO PHQ QUESTIONS 1-9: 0

## 2024-01-16 NOTE — PROGRESS NOTES
Elevated BP: Improved today on rechecking.     Chronic back pain: Continue ibuprofen as needed.     Discussed vaccinations.  Patient has received 1 dose of COVID-19 vaccine.  She does not want to continue with the series.  Does not want to get the shingles vaccine due to severe side effects experienced by her sister.  Hesitant to add PCV 20 vaccine.     Next visit: Medicare wellness    Return in about 7 months (around 8/16/2024) for Medicare Wellness.     I asked the patient if she  had any questions and answered her  questions.  The patient stated that she understands the treatment plan and agrees with the treatment plan    This document was created with a voice activated dictation system and may contain transcription errors.

## 2024-05-14 DIAGNOSIS — J30.89 NON-SEASONAL ALLERGIC RHINITIS, UNSPECIFIED TRIGGER: ICD-10-CM

## 2024-05-17 RX ORDER — FLUTICASONE PROPIONATE 50 MCG
SPRAY, SUSPENSION (ML) NASAL
Qty: 16 G | Refills: 2 | Status: SHIPPED | OUTPATIENT
Start: 2024-05-17

## 2024-08-13 ENCOUNTER — HOSPITAL ENCOUNTER (OUTPATIENT)
Facility: HOSPITAL | Age: 69
Discharge: HOME | End: 2024-08-16
Payer: MEDICARE

## 2024-08-13 DIAGNOSIS — E78.5 HYPERLIPIDEMIA, UNSPECIFIED HYPERLIPIDEMIA TYPE: ICD-10-CM

## 2024-08-13 LAB
ALBUMIN SERPL-MCNC: 3.8 G/DL (ref 3.4–5)
ALBUMIN/GLOB SERPL: 1.5 (ref 0.8–1.7)
ALP SERPL-CCNC: 66 U/L (ref 45–117)
ALT SERPL-CCNC: 12 U/L (ref 13–56)
ANION GAP SERPL CALC-SCNC: 6 MMOL/L (ref 3–18)
AST SERPL-CCNC: 3 U/L (ref 10–38)
BASOPHILS # BLD: 0.1 K/UL (ref 0–0.1)
BASOPHILS NFR BLD: 1 % (ref 0–2)
BILIRUB SERPL-MCNC: 0.5 MG/DL (ref 0.2–1)
BUN SERPL-MCNC: 9 MG/DL (ref 7–18)
BUN/CREAT SERPL: 15 (ref 12–20)
CALCIUM SERPL-MCNC: 9.3 MG/DL (ref 8.5–10.1)
CHLORIDE SERPL-SCNC: 107 MMOL/L (ref 100–111)
CHOLEST SERPL-MCNC: 196 MG/DL
CO2 SERPL-SCNC: 27 MMOL/L (ref 21–32)
CREAT SERPL-MCNC: 0.62 MG/DL (ref 0.6–1.3)
DIFFERENTIAL METHOD BLD: ABNORMAL
EOSINOPHIL # BLD: 0.1 K/UL (ref 0–0.4)
EOSINOPHIL NFR BLD: 2 % (ref 0–5)
ERYTHROCYTE [DISTWIDTH] IN BLOOD BY AUTOMATED COUNT: 12.2 % (ref 11.6–14.5)
GLOBULIN SER CALC-MCNC: 2.5 G/DL (ref 2–4)
GLUCOSE SERPL-MCNC: 88 MG/DL (ref 74–99)
HCT VFR BLD AUTO: 42.8 % (ref 35–45)
HDLC SERPL-MCNC: 50 MG/DL (ref 40–60)
HDLC SERPL: 3.9 (ref 0–5)
HGB BLD-MCNC: 14.4 G/DL (ref 12–16)
IMM GRANULOCYTES # BLD AUTO: 0 K/UL (ref 0–0.04)
IMM GRANULOCYTES NFR BLD AUTO: 0 % (ref 0–0.5)
LDLC SERPL CALC-MCNC: 117.6 MG/DL (ref 0–100)
LIPID PANEL: ABNORMAL
LYMPHOCYTES # BLD: 1.5 K/UL (ref 0.9–3.6)
LYMPHOCYTES NFR BLD: 24 % (ref 21–52)
MCH RBC QN AUTO: 32.9 PG (ref 24–34)
MCHC RBC AUTO-ENTMCNC: 33.6 G/DL (ref 31–37)
MCV RBC AUTO: 97.7 FL (ref 78–100)
MONOCYTES # BLD: 0.4 K/UL (ref 0.05–1.2)
MONOCYTES NFR BLD: 6 % (ref 3–10)
NEUTS SEG # BLD: 4.2 K/UL (ref 1.8–8)
NEUTS SEG NFR BLD: 67 % (ref 40–73)
NRBC # BLD: 0 K/UL (ref 0–0.01)
NRBC BLD-RTO: 0 PER 100 WBC
PLATELET # BLD AUTO: 267 K/UL (ref 135–420)
PMV BLD AUTO: 9 FL (ref 9.2–11.8)
POTASSIUM SERPL-SCNC: 4.2 MMOL/L (ref 3.5–5.5)
PROT SERPL-MCNC: 6.3 G/DL (ref 6.4–8.2)
RBC # BLD AUTO: 4.38 M/UL (ref 4.2–5.3)
SODIUM SERPL-SCNC: 140 MMOL/L (ref 136–145)
TRIGL SERPL-MCNC: 142 MG/DL
VLDLC SERPL CALC-MCNC: 28.4 MG/DL
WBC # BLD AUTO: 6.2 K/UL (ref 4.6–13.2)

## 2024-08-13 PROCEDURE — 36415 COLL VENOUS BLD VENIPUNCTURE: CPT

## 2024-08-13 PROCEDURE — 80061 LIPID PANEL: CPT

## 2024-08-13 PROCEDURE — 85025 COMPLETE CBC W/AUTO DIFF WBC: CPT

## 2024-08-13 PROCEDURE — 80053 COMPREHEN METABOLIC PANEL: CPT

## 2024-08-15 DIAGNOSIS — J30.89 NON-SEASONAL ALLERGIC RHINITIS, UNSPECIFIED TRIGGER: ICD-10-CM

## 2024-08-15 DIAGNOSIS — B00.1 HERPES LABIALIS: ICD-10-CM

## 2024-08-19 RX ORDER — VALACYCLOVIR HYDROCHLORIDE 1 G/1
TABLET, FILM COATED ORAL
Qty: 30 TABLET | Refills: 1 | Status: SHIPPED | OUTPATIENT
Start: 2024-08-19

## 2024-08-19 RX ORDER — FLUTICASONE PROPIONATE 50 MCG
SPRAY, SUSPENSION (ML) NASAL
Qty: 16 G | Refills: 2 | Status: SHIPPED | OUTPATIENT
Start: 2024-08-19

## 2024-08-20 ENCOUNTER — OFFICE VISIT (OUTPATIENT)
Facility: CLINIC | Age: 69
End: 2024-08-20
Payer: MEDICARE

## 2024-08-20 VITALS
TEMPERATURE: 98.4 F | BODY MASS INDEX: 27.8 KG/M2 | RESPIRATION RATE: 16 BRPM | DIASTOLIC BLOOD PRESSURE: 58 MMHG | OXYGEN SATURATION: 96 % | HEART RATE: 69 BPM | HEIGHT: 66 IN | WEIGHT: 173 LBS | SYSTOLIC BLOOD PRESSURE: 128 MMHG

## 2024-08-20 DIAGNOSIS — J30.89 NON-SEASONAL ALLERGIC RHINITIS, UNSPECIFIED TRIGGER: ICD-10-CM

## 2024-08-20 DIAGNOSIS — Z87.891 PERSONAL HISTORY OF TOBACCO USE: ICD-10-CM

## 2024-08-20 DIAGNOSIS — E03.9 ACQUIRED HYPOTHYROIDISM: ICD-10-CM

## 2024-08-20 DIAGNOSIS — J43.9 PULMONARY EMPHYSEMA, UNSPECIFIED EMPHYSEMA TYPE (HCC): ICD-10-CM

## 2024-08-20 DIAGNOSIS — J45.30 MILD PERSISTENT ASTHMA WITHOUT COMPLICATION: ICD-10-CM

## 2024-08-20 DIAGNOSIS — Z00.00 MEDICARE ANNUAL WELLNESS VISIT, SUBSEQUENT: Primary | ICD-10-CM

## 2024-08-20 DIAGNOSIS — G89.29 CHRONIC LOW BACK PAIN WITH SCIATICA, SCIATICA LATERALITY UNSPECIFIED, UNSPECIFIED BACK PAIN LATERALITY: ICD-10-CM

## 2024-08-20 DIAGNOSIS — R22.0 SWELLING OF LEFT SIDE OF FACE: ICD-10-CM

## 2024-08-20 DIAGNOSIS — B00.1 HERPES LABIALIS: ICD-10-CM

## 2024-08-20 DIAGNOSIS — M54.40 CHRONIC LOW BACK PAIN WITH SCIATICA, SCIATICA LATERALITY UNSPECIFIED, UNSPECIFIED BACK PAIN LATERALITY: ICD-10-CM

## 2024-08-20 DIAGNOSIS — E78.5 HYPERLIPIDEMIA, UNSPECIFIED HYPERLIPIDEMIA TYPE: ICD-10-CM

## 2024-08-20 PROCEDURE — G8417 CALC BMI ABV UP PARAM F/U: HCPCS | Performed by: STUDENT IN AN ORGANIZED HEALTH CARE EDUCATION/TRAINING PROGRAM

## 2024-08-20 PROCEDURE — 99214 OFFICE O/P EST MOD 30 MIN: CPT | Performed by: STUDENT IN AN ORGANIZED HEALTH CARE EDUCATION/TRAINING PROGRAM

## 2024-08-20 PROCEDURE — 1090F PRES/ABSN URINE INCON ASSESS: CPT | Performed by: STUDENT IN AN ORGANIZED HEALTH CARE EDUCATION/TRAINING PROGRAM

## 2024-08-20 PROCEDURE — G8427 DOCREV CUR MEDS BY ELIG CLIN: HCPCS | Performed by: STUDENT IN AN ORGANIZED HEALTH CARE EDUCATION/TRAINING PROGRAM

## 2024-08-20 PROCEDURE — 3017F COLORECTAL CA SCREEN DOC REV: CPT | Performed by: STUDENT IN AN ORGANIZED HEALTH CARE EDUCATION/TRAINING PROGRAM

## 2024-08-20 PROCEDURE — 4004F PT TOBACCO SCREEN RCVD TLK: CPT | Performed by: STUDENT IN AN ORGANIZED HEALTH CARE EDUCATION/TRAINING PROGRAM

## 2024-08-20 PROCEDURE — 3023F SPIROM DOC REV: CPT | Performed by: STUDENT IN AN ORGANIZED HEALTH CARE EDUCATION/TRAINING PROGRAM

## 2024-08-20 PROCEDURE — 1123F ACP DISCUSS/DSCN MKR DOCD: CPT | Performed by: STUDENT IN AN ORGANIZED HEALTH CARE EDUCATION/TRAINING PROGRAM

## 2024-08-20 PROCEDURE — G0439 PPPS, SUBSEQ VISIT: HCPCS | Performed by: STUDENT IN AN ORGANIZED HEALTH CARE EDUCATION/TRAINING PROGRAM

## 2024-08-20 PROCEDURE — G8399 PT W/DXA RESULTS DOCUMENT: HCPCS | Performed by: STUDENT IN AN ORGANIZED HEALTH CARE EDUCATION/TRAINING PROGRAM

## 2024-08-20 RX ORDER — FLUTICASONE PROPIONATE 50 MCG
SPRAY, SUSPENSION (ML) NASAL
Qty: 16 G | Refills: 2 | Status: CANCELLED | OUTPATIENT
Start: 2024-08-20

## 2024-08-20 SDOH — ECONOMIC STABILITY: FOOD INSECURITY: WITHIN THE PAST 12 MONTHS, THE FOOD YOU BOUGHT JUST DIDN'T LAST AND YOU DIDN'T HAVE MONEY TO GET MORE.: NEVER TRUE

## 2024-08-20 SDOH — ECONOMIC STABILITY: INCOME INSECURITY: HOW HARD IS IT FOR YOU TO PAY FOR THE VERY BASICS LIKE FOOD, HOUSING, MEDICAL CARE, AND HEATING?: NOT HARD AT ALL

## 2024-08-20 SDOH — ECONOMIC STABILITY: FOOD INSECURITY: WITHIN THE PAST 12 MONTHS, YOU WORRIED THAT YOUR FOOD WOULD RUN OUT BEFORE YOU GOT MONEY TO BUY MORE.: NEVER TRUE

## 2024-08-20 ASSESSMENT — ANXIETY QUESTIONNAIRES
3. WORRYING TOO MUCH ABOUT DIFFERENT THINGS: NOT AT ALL
GAD7 TOTAL SCORE: 1
1. FEELING NERVOUS, ANXIOUS, OR ON EDGE: SEVERAL DAYS
7. FEELING AFRAID AS IF SOMETHING AWFUL MIGHT HAPPEN: NOT AT ALL
4. TROUBLE RELAXING: NOT AT ALL
IF YOU CHECKED OFF ANY PROBLEMS ON THIS QUESTIONNAIRE, HOW DIFFICULT HAVE THESE PROBLEMS MADE IT FOR YOU TO DO YOUR WORK, TAKE CARE OF THINGS AT HOME, OR GET ALONG WITH OTHER PEOPLE: NOT DIFFICULT AT ALL
6. BECOMING EASILY ANNOYED OR IRRITABLE: NOT AT ALL
2. NOT BEING ABLE TO STOP OR CONTROL WORRYING: NOT AT ALL
5. BEING SO RESTLESS THAT IT IS HARD TO SIT STILL: NOT AT ALL

## 2024-08-20 ASSESSMENT — ENCOUNTER SYMPTOMS
NAUSEA: 0
RHINORRHEA: 0
SHORTNESS OF BREATH: 0
CHEST TIGHTNESS: 0
DIARRHEA: 0
ABDOMINAL PAIN: 0
BACK PAIN: 1
WHEEZING: 0
BLOOD IN STOOL: 0
VOMITING: 0
COUGH: 0

## 2024-08-20 ASSESSMENT — PATIENT HEALTH QUESTIONNAIRE - PHQ9
SUM OF ALL RESPONSES TO PHQ QUESTIONS 1-9: 0
1. LITTLE INTEREST OR PLEASURE IN DOING THINGS: NOT AT ALL
SUM OF ALL RESPONSES TO PHQ QUESTIONS 1-9: 0
SUM OF ALL RESPONSES TO PHQ QUESTIONS 1-9: 0
SUM OF ALL RESPONSES TO PHQ9 QUESTIONS 1 & 2: 0
SUM OF ALL RESPONSES TO PHQ QUESTIONS 1-9: 0
2. FEELING DOWN, DEPRESSED OR HOPELESS: NOT AT ALL

## 2024-08-20 ASSESSMENT — LIFESTYLE VARIABLES
HOW OFTEN DO YOU HAVE A DRINK CONTAINING ALCOHOL: MONTHLY OR LESS
HOW MANY STANDARD DRINKS CONTAINING ALCOHOL DO YOU HAVE ON A TYPICAL DAY: PATIENT DOES NOT DRINK

## 2024-08-20 NOTE — PROGRESS NOTES
Medicare Annual Wellness Visit    Jenifer He is here for Medicare AWV    Assessment & Plan   Medicare annual wellness visit, subsequent      Recommendations for Preventive Services Due: see orders and patient instructions/AVS.  Recommended screening schedule for the next 5-10 years is provided to the patient in written form: see Patient Instructions/AVS.     Provided ACP documents.  Counseled on smoking cessation.      Return in about 6 months (around 2/20/2025).     Subjective       Patient's complete Health Risk Assessment and screening values have been reviewed and are found in Flowsheets. The following problems were reviewed today and where indicated follow up appointments were made and/or referrals ordered.    Positive Risk Factor Screenings with Interventions:                 Poor Eating Habits/Diet:  Do you eat balanced/healthy meals regularly?: (!) No  Interventions:  See AVS for additional education material          Advanced Directives:  Do you have a Living Will?: (!) No    Intervention:  has NO advanced directive - information provided        Tobacco Use:    Tobacco Use      Smoking status: Every Day        Packs/day: 0.25        Years: 0.3 packs/day for 37.0 years (9.3 ttl pk-yrs)        Types: Cigarettes      Smokeless tobacco: Never     Interventions:  Patient is not ready to quit at this time.                      Objective   Vitals:    08/20/24 1013   BP: (!) 128/58   Site: Left Upper Arm   Position: Sitting   Cuff Size: Large Adult   Pulse: 69   Resp: 16   Temp: 98.4 °F (36.9 °C)   TempSrc: Temporal   SpO2: 96%   Weight: 78.5 kg (173 lb)   Height: 1.676 m (5' 6\")      Body mass index is 27.92 kg/m².                  Allergies   Allergen Reactions    Atorvastatin Other (See Comments)     Leg cramps and swelling    Sulfa Antibiotics Hives     Other reaction(s): rash/itching     Prior to Visit Medications    Medication Sig Taking? Authorizing Provider   fluticasone (FLONASE) 50 MCG/ACT nasal spray 
\"Have you been to the ER, urgent care clinic since your last visit?  Hospitalized since your last visit?\"    NO    “Have you seen or consulted any other health care providers outside of [facility] since your last visit?”    NO            Click Here for Release of Records Request   
results in this chemical assay. Please  be sure to submit blood samples obtained  BEFORE administration of either of these  drugs to assure correct results.      HDL 08/13/2024 50  40 - 60 MG/DL Final    LDL Cholesterol 08/13/2024 117.6 (H)  0 - 100 MG/DL Final    VLDL Cholesterol Calculated 08/13/2024 28.4  MG/DL Final    Chol/HDL Ratio 08/13/2024 3.9  0 - 5.0   Final       No results found for any visits on 08/20/24.    Patient Care Team:  Patient Care Team:  Regla Hernandez MD as PCP - General  Regla Hernandez MD [facility]      Assessment / Plan:     Diagnosis Orders   1. Swelling of left side of face  US HEAD NECK SOFT TISSUE THYROID      2. Non-seasonal allergic rhinitis, unspecified trigger        3. Acquired hypothyroidism        4. Herpes labialis        5. Pulmonary emphysema, unspecified emphysema type (HCC)        6. Hyperlipidemia, unspecified hyperlipidemia type        7. Chronic low back pain with sciatica, sciatica laterality unspecified, unspecified back pain laterality        8. Personal history of tobacco use        9. Mild persistent asthma without complication            Labs reviewed and discussed with patient.    Soft swelling at left side of face: US ordered.     Asthma/COPD: Continue albuterol as needed.    Patient does not want to proceed further with CT lung cancer screening.    HLD: Counseled on lifestyle changes.  She does not want to start on a statin    Herpes labialis: Continue Valtrex as needed.     Hypothyroidism: Stable. Continue Synthroid.  Patient is following with Dr. Jin.    Allergic rhinitis: Continue Flonase.    Chronic back pain: Continue ibuprofen as needed.     Discussed vaccinations.  Declined vaccines      Return in about 6 months (around 2/20/2025).     I asked the patient if she  had any questions and answered her  questions.  The patient stated that she understands the treatment plan and agrees with the treatment plan    This document was

## 2024-08-20 NOTE — PATIENT INSTRUCTIONS
Eating Healthy Foods: Care Instructions  With every meal, you can make healthy food choices. Try to eat a variety of fruits, vegetables, whole grains, lean proteins, and low-fat dairy products. This can help you get the right balance of nutrients, including vitamins and minerals. Small changes add up over time. You can start by adding one healthy food to your meals each day.    Try to make half your plate fruits and vegetables, one-fourth whole grains, and one-fourth lean proteins. Try including dairy with your meals.   Eat more fruits and vegetables. Try to have them with most meals and snacks.   Foods for healthy eating        Fruits   These can be fresh, frozen, canned, or dried.  Try to choose whole fruit rather than fruit juice.  Eat a variety of colors.        Vegetables   These can be fresh, frozen, canned, or dried.  Beans, peas, and lentils count too.        Whole grains   Choose whole-grain breads, cereals, and noodles.  Try brown rice.        Lean proteins   These can include lean meat, poultry, fish, and eggs.  You can also have tofu, beans, peas, lentils, nuts, and seeds.        Dairy   Try milk, yogurt, and cheese.  Choose low-fat or fat-free when you can.  If you need to, use lactose-free milk or fortified plant-based milk products, such as soy milk.        Water   Drink water when you're thirsty.  Limit sugar-sweetened drinks, including soda, fruit drinks, and sports drinks.  Where can you learn more?  Go to https://www.[employer].net/patientEd and enter T756 to learn more about \"Eating Healthy Foods: Care Instructions.\"  Current as of: September 20, 2023  Content Version: 14.1  © 7924-7001 [employer].   Care instructions adapted under license by [employer]. If you have questions about a medical condition or this instruction, always ask your healthcare professional. [employer] disclaims any warranty or liability for your use of this information.           [employer]

## 2024-09-03 ENCOUNTER — HOSPITAL ENCOUNTER (OUTPATIENT)
Facility: HOSPITAL | Age: 69
Discharge: HOME | End: 2024-09-06
Attending: STUDENT IN AN ORGANIZED HEALTH CARE EDUCATION/TRAINING PROGRAM
Payer: MEDICARE

## 2024-09-03 DIAGNOSIS — R22.0 SWELLING OF LEFT SIDE OF FACE: ICD-10-CM

## 2024-09-03 PROCEDURE — 76536 US EXAM OF HEAD AND NECK: CPT

## 2024-09-05 ENCOUNTER — TELEPHONE (OUTPATIENT)
Facility: CLINIC | Age: 69
End: 2024-09-05

## 2024-09-05 NOTE — TELEPHONE ENCOUNTER
Please inform patient that ultrasound just showed a dilated blood vessel-no other concerning findings.

## 2024-09-05 NOTE — TELEPHONE ENCOUNTER
Patient called in wanting to speak with provider or nurse about her recent Ultrasound results. Patient would like a call back at [phone].

## 2024-09-11 ENCOUNTER — TELEPHONE (OUTPATIENT)
Facility: CLINIC | Age: 69
End: 2024-09-11

## 2024-09-11 NOTE — TELEPHONE ENCOUNTER
TC-call was received. Pt stated she was returning a call no one placed. Pt stated she was calling because she was upset that provider has not call about results. I explained to pt that provider is in clinic at the time. However; I will give message to provider. I informed pt that provided my have not called to discuss if results have not be reviewed yet and some results discussion results in a follow up appointment. Pt was not happy. Pt was not in agreement to reasons.. Pt ended call.

## 2024-09-13 ENCOUNTER — TELEPHONE (OUTPATIENT)
Facility: CLINIC | Age: 69
End: 2024-09-13

## 2024-09-13 DIAGNOSIS — I72.8: Primary | ICD-10-CM

## 2024-10-01 ENCOUNTER — OFFICE VISIT (OUTPATIENT)
Age: 69
End: 2024-10-01
Payer: MEDICARE

## 2024-10-01 VITALS
DIASTOLIC BLOOD PRESSURE: 84 MMHG | OXYGEN SATURATION: 97 % | HEIGHT: 66 IN | SYSTOLIC BLOOD PRESSURE: 130 MMHG | HEART RATE: 67 BPM | WEIGHT: 172 LBS | BODY MASS INDEX: 27.64 KG/M2

## 2024-10-01 DIAGNOSIS — I72.8: Primary | ICD-10-CM

## 2024-10-01 PROCEDURE — G8484 FLU IMMUNIZE NO ADMIN: HCPCS | Performed by: SURGERY

## 2024-10-01 PROCEDURE — 4004F PT TOBACCO SCREEN RCVD TLK: CPT | Performed by: SURGERY

## 2024-10-01 PROCEDURE — 99203 OFFICE O/P NEW LOW 30 MIN: CPT | Performed by: SURGERY

## 2024-10-01 PROCEDURE — G8399 PT W/DXA RESULTS DOCUMENT: HCPCS | Performed by: SURGERY

## 2024-10-01 PROCEDURE — 1090F PRES/ABSN URINE INCON ASSESS: CPT | Performed by: SURGERY

## 2024-10-01 PROCEDURE — 3017F COLORECTAL CA SCREEN DOC REV: CPT | Performed by: SURGERY

## 2024-10-01 PROCEDURE — G8427 DOCREV CUR MEDS BY ELIG CLIN: HCPCS | Performed by: SURGERY

## 2024-10-01 PROCEDURE — G8417 CALC BMI ABV UP PARAM F/U: HCPCS | Performed by: SURGERY

## 2024-10-01 PROCEDURE — 1123F ACP DISCUSS/DSCN MKR DOCD: CPT | Performed by: SURGERY

## 2024-10-01 NOTE — PROGRESS NOTES
SURGERY PROC UNLISTED      HYSTERECTOMY (CERVIX STATUS UNKNOWN)      HYSTEROSCOPY      OVARIAN CYST REMOVAL      teenager    TUBAL LIGATION      30 years ago     Current Outpatient Medications   Medication Sig Dispense Refill    fluticasone (FLONASE) 50 MCG/ACT nasal spray SHAKE LIQUID AND USE 2 SPRAYS IN EACH NOSTRIL DAILY 16 g 2    valACYclovir (VALTREX) 1 g tablet TAKE 1 TABLET BY MOUTH DAILY FOR 5 DAYS FOR OUTBREAK 30 tablet 1    acetaminophen (TYLENOL) 500 MG tablet Take 1 tablet by mouth every 4 hours as needed      levothyroxine (SYNTHROID) 100 MCG tablet TAKE 1 TABLET BY MOUTH EVERY DAY IN THE MORNING ON AN EMPTY STOMACH      albuterol sulfate HFA (PROVENTIL;VENTOLIN;PROAIR) 108 (90 Base) MCG/ACT inhaler Inhale 2 puffs into the lungs every 4 hours as needed      ergocalciferol (ERGOCALCIFEROL) 1.25 MG (44257 UT) capsule TAKE 1 CAPSULE BY MOUTH EVERY 7 DAYS      montelukast (SINGULAIR) 10 MG tablet TAKE 1 TABLET BY MOUTH DAILY FOR ASTHMA (Patient not taking: Reported on 1/16/2024) 90 tablet 1    Prenatal Vit-Fe Fumarate-FA (M-VIT PO) Take by mouth (Patient not taking: Reported on 1/16/2024)      mupirocin (BACTROBAN) 2 % ointment APPLY TOPICALLY TO THE AFFECTED AREA TWICE DAILY FOR 7 DAYS (Patient not taking: Reported on 1/16/2024)      triamcinolone (KENALOG) 0.1 % cream APPLY TOPICALLY TO THE AFFECTED AREA TWICE DAILY FOR 7 DAYS (Patient not taking: Reported on 1/16/2024)      methocarbamol (ROBAXIN) 500 MG tablet Take 1 tab by mouth BID-TID as needed for muscle spasm. (Patient not taking: Reported on 1/16/2024)       No current facility-administered medications for this visit.     Allergies   Allergen Reactions    Atorvastatin Other (See Comments)     Leg cramps and swelling    Sulfa Antibiotics Hives     Other reaction(s): rash/itching      Social History     Tobacco Use    Smoking status: Every Day     Current packs/day: 0.25     Average packs/day: 0.3 packs/day for 37.0 years (9.3 ttl pk-yrs)     Types:

## 2024-10-01 NOTE — PROGRESS NOTES
aneurysms, or connective tissue disorders. Remote hx of hitting this area of her head on a car door.     Plan:  Will obtain CTA head/neck to further characterize and to evaluate for any signs of intracranial aneurysms or disease.   RTC after the CTA to discuss findings and recommendations.       Nena Escobar MD  Vascular Surgeon  [facility]      I spent approximately 35 minutes on this patient encounter, which includes but is not limited to performing a history and physical exam, reviewing primary care and consultant documentation, reviewing imaging/studies, and speaking with her regarding my impression and plan.       Past Medical History:   Diagnosis Date    Asthma     childhood diagnosis; occasional inhaler use    Chest pain     Diverticulitis     Dyslipidemia     \"borderline\" per pt.    Endometriosis     GERD (gastroesophageal reflux disease)     Hypothyroidism     Restless leg syndrome     Thyroid disease     Tobacco use disorder     ongoing     Patient Active Problem List   Diagnosis    Asthma    Dyslipidemia    Sigmoid diverticulitis    Hypothyroidism    Restless leg syndrome    Thyroid disease    Endometriosis    Sebaceous cyst    Chest pain    Tobacco use disorder    Sebaceous cyst of breast, right    Pulmonary emphysema, unspecified emphysema type (HCC)     Past Surgical History:   Procedure Laterality Date    FOOT/TOES SURGERY PROC UNLISTED      HYSTERECTOMY (CERVIX STATUS UNKNOWN)      HYSTEROSCOPY      OVARIAN CYST REMOVAL      teenager    TUBAL LIGATION      30 years ago     Current Outpatient Medications   Medication Sig Dispense Refill    fluticasone (FLONASE) 50 MCG/ACT nasal spray SHAKE LIQUID AND USE 2 SPRAYS IN EACH NOSTRIL DAILY 16 g 2    valACYclovir (VALTREX) 1 g tablet TAKE 1 TABLET BY MOUTH DAILY FOR 5 DAYS FOR OUTBREAK 30 tablet 1    acetaminophen (TYLENOL) 500 MG tablet Take 1 tablet by mouth every 4 hours as needed      levothyroxine

## 2024-10-08 ENCOUNTER — HOSPITAL ENCOUNTER (OUTPATIENT)
Facility: HOSPITAL | Age: 69
Discharge: HOME | End: 2024-10-11
Attending: SURGERY
Payer: MEDICARE

## 2024-10-08 DIAGNOSIS — I72.8: ICD-10-CM

## 2024-10-08 LAB — CREAT UR-MCNC: 0.7 MG/DL (ref 0.6–1.3)

## 2024-10-08 PROCEDURE — 70496 CT ANGIOGRAPHY HEAD: CPT

## 2024-10-08 PROCEDURE — 6360000004 HC RX CONTRAST MEDICATION: Performed by: SURGERY

## 2024-10-08 PROCEDURE — 82565 ASSAY OF CREATININE: CPT

## 2024-10-08 RX ORDER — IOPAMIDOL 755 MG/ML
80 INJECTION, SOLUTION INTRAVASCULAR
Status: COMPLETED | OUTPATIENT
Start: 2024-10-08 | End: 2024-10-08

## 2024-10-08 RX ADMIN — IOPAMIDOL 80 ML: 755 INJECTION, SOLUTION INTRAVENOUS at 14:12

## 2024-10-14 NOTE — PROGRESS NOTES
Patient: Jenifer He                MRN: [id number]       SSN: [id number]  YOB: 1955        AGE: 69 y.o.        SEX: female      PCP: Regla Hernandez MD  10/17/24    Chief Complaint   Patient presents with    Knee Pain     left    Left thumb pain     Plantar callosity left foot     HISTORY:  Jenifer He is a 69 y.o. female seen for left knee and left hand pain.  She denies any recent hand or knee injury. She pain and swelling at the volar base of her left thumb. She feels a painful catching and popping with left thumb movement. She feels left thumb pain with pinching and gripping.     She feels left knee pain with standing, walking and stair climbing.  She experiences left knee startup pain after sitting.  She was previously seen for bilateral knee pain.  She states she has swelling episodes that prevented her from walking when she was younger.     She was previously seen in 2017 for left hand injury. She reports that she accidentally stabbed her left hand in the thenar eminence on 10/24/17 when she was trying to scoop up a crusty brownie from the edge of a baking pan. The sharp knife slipped and went right into her palm at the base of her left thumb.  The wound bled profusely.  She was seen at  ED wheere her bleeding was stopped and her laceration sutured.    She has noted a thick callus beneath the third metatarsal head left foot.  She does not recall any foot injury.  She has seen Dr. Thomas in the past for foot problems.     Occupation, etc:  Ms. He is retired. 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  She now runs [employer] in [place] a few nights a week. She walks and rides a bicycle for exercise.  She lives in [place] with her .  Her son owns his own [employer] business in [place]. She also has two daughters who live in the area.  She has 7 grandchildren.  Wt Readings from Last 3 Encounters:   10/17/24 80.7 kg (178 lb)

## 2024-10-17 ENCOUNTER — OFFICE VISIT (OUTPATIENT)
Age: 69
End: 2024-10-17

## 2024-10-17 VITALS — HEIGHT: 66 IN | WEIGHT: 178 LBS | BODY MASS INDEX: 28.61 KG/M2 | TEMPERATURE: 97.1 F

## 2024-10-17 DIAGNOSIS — L84 PLANTAR CALLOSITY: ICD-10-CM

## 2024-10-17 DIAGNOSIS — M65.312 TRIGGER THUMB OF LEFT HAND: ICD-10-CM

## 2024-10-17 DIAGNOSIS — M17.12 UNILATERAL PRIMARY OSTEOARTHRITIS, LEFT KNEE: ICD-10-CM

## 2024-10-17 DIAGNOSIS — M25.562 CHRONIC PAIN OF LEFT KNEE: Primary | ICD-10-CM

## 2024-10-17 DIAGNOSIS — M79.642 HAND PAIN, LEFT: ICD-10-CM

## 2024-10-17 DIAGNOSIS — G89.29 CHRONIC PAIN OF LEFT KNEE: Primary | ICD-10-CM

## 2024-10-17 RX ORDER — BETAMETHASONE SODIUM PHOSPHATE AND BETAMETHASONE ACETATE 3; 3 MG/ML; MG/ML
3 INJECTION, SUSPENSION INTRA-ARTICULAR; INTRALESIONAL; INTRAMUSCULAR; SOFT TISSUE ONCE
Status: COMPLETED | OUTPATIENT
Start: 2024-10-17 | End: 2024-10-17

## 2024-10-17 RX ORDER — BUPIVACAINE HYDROCHLORIDE 5 MG/ML
4 INJECTION, SOLUTION PERINEURAL ONCE
Status: COMPLETED | OUTPATIENT
Start: 2024-10-17 | End: 2024-10-17

## 2024-10-17 RX ORDER — BUPIVACAINE HYDROCHLORIDE 5 MG/ML
0.5 INJECTION, SOLUTION PERINEURAL ONCE
Status: COMPLETED | OUTPATIENT
Start: 2024-10-17 | End: 2024-10-17

## 2024-10-17 RX ADMIN — BUPIVACAINE HYDROCHLORIDE 2.5 MG: 5 INJECTION, SOLUTION PERINEURAL at 09:41

## 2024-10-17 RX ADMIN — BETAMETHASONE SODIUM PHOSPHATE AND BETAMETHASONE ACETATE 3 MG: 3; 3 INJECTION, SUSPENSION INTRA-ARTICULAR; INTRALESIONAL; INTRAMUSCULAR; SOFT TISSUE at 09:39

## 2024-10-17 RX ADMIN — BUPIVACAINE HYDROCHLORIDE 20 MG: 5 INJECTION, SOLUTION PERINEURAL at 09:41

## 2024-10-28 ENCOUNTER — TELEPHONE (OUTPATIENT)
Facility: CLINIC | Age: 69
End: 2024-10-28

## 2024-10-31 ENCOUNTER — OFFICE VISIT (OUTPATIENT)
Facility: CLINIC | Age: 69
End: 2024-10-31

## 2024-10-31 VITALS — TEMPERATURE: 97.9 F | BODY MASS INDEX: 28.12 KG/M2 | WEIGHT: 175 LBS | RESPIRATION RATE: 16 BRPM | HEIGHT: 66 IN

## 2024-10-31 DIAGNOSIS — Z72.0 TOBACCO ABUSE: ICD-10-CM

## 2024-10-31 DIAGNOSIS — K57.92 DIVERTICULITIS: ICD-10-CM

## 2024-10-31 DIAGNOSIS — R10.32 LEFT LOWER QUADRANT ABDOMINAL PAIN: Primary | ICD-10-CM

## 2024-10-31 DIAGNOSIS — I72.8: ICD-10-CM

## 2024-10-31 DIAGNOSIS — J43.9 PULMONARY EMPHYSEMA, UNSPECIFIED EMPHYSEMA TYPE (HCC): ICD-10-CM

## 2024-10-31 RX ORDER — ALBUTEROL SULFATE 90 UG/1
2 INHALANT RESPIRATORY (INHALATION) EVERY 4 HOURS PRN
Qty: 18 G | Refills: 2 | Status: SHIPPED | OUTPATIENT
Start: 2024-10-31

## 2024-10-31 RX ORDER — METRONIDAZOLE 500 MG/1
500 TABLET ORAL 2 TIMES DAILY
Qty: 10 TABLET | Refills: 0 | Status: SHIPPED | OUTPATIENT
Start: 2024-10-31 | End: 2024-11-05

## 2024-10-31 RX ORDER — CIPROFLOXACIN 500 MG/1
500 TABLET, FILM COATED ORAL 2 TIMES DAILY
Qty: 10 TABLET | Refills: 0 | Status: SHIPPED | OUTPATIENT
Start: 2024-10-31 | End: 2024-11-05

## 2024-10-31 SDOH — ECONOMIC STABILITY: FOOD INSECURITY: WITHIN THE PAST 12 MONTHS, YOU WORRIED THAT YOUR FOOD WOULD RUN OUT BEFORE YOU GOT MONEY TO BUY MORE.: NEVER TRUE

## 2024-10-31 SDOH — ECONOMIC STABILITY: INCOME INSECURITY: HOW HARD IS IT FOR YOU TO PAY FOR THE VERY BASICS LIKE FOOD, HOUSING, MEDICAL CARE, AND HEATING?: NOT HARD AT ALL

## 2024-10-31 SDOH — ECONOMIC STABILITY: FOOD INSECURITY: WITHIN THE PAST 12 MONTHS, THE FOOD YOU BOUGHT JUST DIDN'T LAST AND YOU DIDN'T HAVE MONEY TO GET MORE.: NEVER TRUE

## 2024-10-31 ASSESSMENT — ANXIETY QUESTIONNAIRES
6. BECOMING EASILY ANNOYED OR IRRITABLE: NOT AT ALL
1. FEELING NERVOUS, ANXIOUS, OR ON EDGE: SEVERAL DAYS
4. TROUBLE RELAXING: NOT AT ALL
2. NOT BEING ABLE TO STOP OR CONTROL WORRYING: NOT AT ALL
IF YOU CHECKED OFF ANY PROBLEMS ON THIS QUESTIONNAIRE, HOW DIFFICULT HAVE THESE PROBLEMS MADE IT FOR YOU TO DO YOUR WORK, TAKE CARE OF THINGS AT HOME, OR GET ALONG WITH OTHER PEOPLE: NOT DIFFICULT AT ALL
5. BEING SO RESTLESS THAT IT IS HARD TO SIT STILL: NOT AT ALL
7. FEELING AFRAID AS IF SOMETHING AWFUL MIGHT HAPPEN: NOT AT ALL
3. WORRYING TOO MUCH ABOUT DIFFERENT THINGS: NOT AT ALL
GAD7 TOTAL SCORE: 1

## 2024-10-31 ASSESSMENT — PATIENT HEALTH QUESTIONNAIRE - PHQ9
1. LITTLE INTEREST OR PLEASURE IN DOING THINGS: NOT AT ALL
SUM OF ALL RESPONSES TO PHQ QUESTIONS 1-9: 0
SUM OF ALL RESPONSES TO PHQ9 QUESTIONS 1 & 2: 0
2. FEELING DOWN, DEPRESSED OR HOPELESS: NOT AT ALL
SUM OF ALL RESPONSES TO PHQ QUESTIONS 1-9: 0

## 2024-10-31 ASSESSMENT — ENCOUNTER SYMPTOMS
WHEEZING: 0
COUGH: 0
SHORTNESS OF BREATH: 0
RHINORRHEA: 0
BACK PAIN: 1
ABDOMINAL PAIN: 1
VOMITING: 0
BLOOD IN STOOL: 0
CHEST TIGHTNESS: 0
NAUSEA: 0
DIARRHEA: 0

## 2024-11-05 ENCOUNTER — OFFICE VISIT (OUTPATIENT)
Age: 69
End: 2024-11-05
Payer: MEDICARE

## 2024-11-05 VITALS
BODY MASS INDEX: 27.97 KG/M2 | DIASTOLIC BLOOD PRESSURE: 68 MMHG | SYSTOLIC BLOOD PRESSURE: 128 MMHG | HEART RATE: 63 BPM | RESPIRATION RATE: 18 BRPM | HEIGHT: 66 IN | WEIGHT: 174 LBS | OXYGEN SATURATION: 97 %

## 2024-11-05 DIAGNOSIS — I65.23 CAROTID STENOSIS, ASYMPTOMATIC, BILATERAL: Primary | ICD-10-CM

## 2024-11-05 PROCEDURE — G8427 DOCREV CUR MEDS BY ELIG CLIN: HCPCS | Performed by: SURGERY

## 2024-11-05 PROCEDURE — G8417 CALC BMI ABV UP PARAM F/U: HCPCS | Performed by: SURGERY

## 2024-11-05 PROCEDURE — 99213 OFFICE O/P EST LOW 20 MIN: CPT | Performed by: SURGERY

## 2024-11-05 PROCEDURE — 1090F PRES/ABSN URINE INCON ASSESS: CPT | Performed by: SURGERY

## 2024-11-05 PROCEDURE — G8399 PT W/DXA RESULTS DOCUMENT: HCPCS | Performed by: SURGERY

## 2024-11-05 PROCEDURE — 1159F MED LIST DOCD IN RCRD: CPT | Performed by: SURGERY

## 2024-11-05 PROCEDURE — 4004F PT TOBACCO SCREEN RCVD TLK: CPT | Performed by: SURGERY

## 2024-11-05 PROCEDURE — G8484 FLU IMMUNIZE NO ADMIN: HCPCS | Performed by: SURGERY

## 2024-11-05 PROCEDURE — 1123F ACP DISCUSS/DSCN MKR DOCD: CPT | Performed by: SURGERY

## 2024-11-05 PROCEDURE — 3017F COLORECTAL CA SCREEN DOC REV: CPT | Performed by: SURGERY

## 2024-11-05 NOTE — PROGRESS NOTES
[facility]    Vascular Surgery Office Visit    Jenifer He  Chief Complaint   Patient presents with    Follow-up     Aneurysm of left superficial temporal artery       History:  69 y.o. female was recently referred by her PCM for evaluation of a L temporal artery possible aneurysm or pseudoaneurysm. She had a small nodule in the region of the temporal artery, and soft tissue duplex had suggested possible pseudoaneurysm. I ordered a CTA head/neck to further characterize the area of concern.  Of note, I care for the patient's . She returns today to discuss findings from the CTA. No changes to her history. No sx of CVA, TIA, AF. She continues to smoke.       Physical Exam:    /68 (Site: Right Upper Arm, Position: Sitting, Cuff Size: Medium Adult)   Pulse 63   Resp 18   Ht 1.676 m (5' 6\")   Wt 78.9 kg (174 lb)   SpO2 97%   BMI 28.08 kg/m²      Constitutional:  Patient is well developed, well nourished, and not distressed. She appears anxious.   HEENT: Atraumatic, normocephalic. No carotid bruits appreciated.  Palpable L temporal artery. No discrete mass present on my exam. There is fullness in the L temporal region. This is unchanged from the prior exam.   Cardiovascular:  Normal rate, regular rhythm, normal heart sounds.  Pulmonary/Chest: Effort normal and breath sounds normal.    Abdominal:   Soft, non-distended. No tenderness to palpation.   Extremities: BUE and BLE warm.   Neurological:  she  is alert and oriented x3. Motor & sensory grossly intact in all 4 limbs.       Imaging/Studies:   Oct 2024  CTA head/neck: Temporal artery branch with mild dilation to 2mm. It is tortuous. Mild 30-40% stenosis of the bilateral internal carotid arteries. Emphysematous changes with new ground glass densities noted. No other incidental findings.     Sep 2024  Soft tissue ultrasound of the head/neck: 1 x 0.6 . 0.3 cm dilated artery with turbulent flow and a reported yin-yang flow

## 2024-11-13 ENCOUNTER — TELEPHONE (OUTPATIENT)
Facility: CLINIC | Age: 69
End: 2024-11-13

## 2024-11-13 ENCOUNTER — HOSPITAL ENCOUNTER (OUTPATIENT)
Facility: HOSPITAL | Age: 69
Discharge: HOME | End: 2024-11-16
Attending: STUDENT IN AN ORGANIZED HEALTH CARE EDUCATION/TRAINING PROGRAM
Payer: MEDICARE

## 2024-11-13 DIAGNOSIS — Z72.0 TOBACCO ABUSE: ICD-10-CM

## 2024-11-13 DIAGNOSIS — R10.32 LEFT LOWER QUADRANT ABDOMINAL PAIN: ICD-10-CM

## 2024-11-13 DIAGNOSIS — J43.9 PULMONARY EMPHYSEMA, UNSPECIFIED EMPHYSEMA TYPE (HCC): ICD-10-CM

## 2024-11-13 PROCEDURE — 6360000004 HC RX CONTRAST MEDICATION: Performed by: STUDENT IN AN ORGANIZED HEALTH CARE EDUCATION/TRAINING PROGRAM

## 2024-11-13 PROCEDURE — 2500000003 HC RX 250 WO HCPCS: Performed by: STUDENT IN AN ORGANIZED HEALTH CARE EDUCATION/TRAINING PROGRAM

## 2024-11-13 PROCEDURE — 74177 CT ABD & PELVIS W/CONTRAST: CPT

## 2024-11-13 PROCEDURE — 82565 ASSAY OF CREATININE: CPT

## 2024-11-13 RX ORDER — IOPAMIDOL 612 MG/ML
100 INJECTION, SOLUTION INTRAVASCULAR
Status: COMPLETED | OUTPATIENT
Start: 2024-11-13 | End: 2024-11-13

## 2024-11-13 RX ADMIN — IOPAMIDOL 100 ML: 612 INJECTION, SOLUTION INTRAVENOUS at 12:31

## 2024-11-13 RX ADMIN — BARIUM SULFATE 900 ML: 20 SUSPENSION ORAL at 10:33

## 2024-11-13 NOTE — TELEPHONE ENCOUNTER
Issac at [facility] Radiology dept. Called [phone]    He is asking about the imaging test ordered-and wants to know if you will cosign on combining the 2 test together.  Ct chest and ct abdomen/pelvis with contrast.    Please call [phone]

## 2024-11-15 LAB — CREAT UR-MCNC: 0.7 MG/DL (ref 0.6–1.3)

## 2025-01-02 ENCOUNTER — TELEPHONE (OUTPATIENT)
Facility: CLINIC | Age: 70
End: 2025-01-02

## 2025-01-26 SDOH — ECONOMIC STABILITY: INCOME INSECURITY: IN THE LAST 12 MONTHS, WAS THERE A TIME WHEN YOU WERE NOT ABLE TO PAY THE MORTGAGE OR RENT ON TIME?: NO

## 2025-01-26 SDOH — ECONOMIC STABILITY: FOOD INSECURITY: WITHIN THE PAST 12 MONTHS, THE FOOD YOU BOUGHT JUST DIDN'T LAST AND YOU DIDN'T HAVE MONEY TO GET MORE.: NEVER TRUE

## 2025-01-26 SDOH — ECONOMIC STABILITY: TRANSPORTATION INSECURITY
IN THE PAST 12 MONTHS, HAS LACK OF TRANSPORTATION KEPT YOU FROM MEETINGS, WORK, OR FROM GETTING THINGS NEEDED FOR DAILY LIVING?: NO

## 2025-01-26 SDOH — ECONOMIC STABILITY: TRANSPORTATION INSECURITY
IN THE PAST 12 MONTHS, HAS THE LACK OF TRANSPORTATION KEPT YOU FROM MEDICAL APPOINTMENTS OR FROM GETTING MEDICATIONS?: NO

## 2025-01-26 SDOH — ECONOMIC STABILITY: FOOD INSECURITY: WITHIN THE PAST 12 MONTHS, YOU WORRIED THAT YOUR FOOD WOULD RUN OUT BEFORE YOU GOT MONEY TO BUY MORE.: NEVER TRUE

## 2025-01-28 ENCOUNTER — OFFICE VISIT (OUTPATIENT)
Facility: CLINIC | Age: 70
End: 2025-01-28
Payer: MEDICARE

## 2025-01-28 VITALS
DIASTOLIC BLOOD PRESSURE: 59 MMHG | WEIGHT: 177 LBS | BODY MASS INDEX: 28.45 KG/M2 | HEIGHT: 66 IN | OXYGEN SATURATION: 97 % | SYSTOLIC BLOOD PRESSURE: 141 MMHG | HEART RATE: 69 BPM

## 2025-01-28 DIAGNOSIS — M54.40 CHRONIC LOW BACK PAIN WITH SCIATICA, SCIATICA LATERALITY UNSPECIFIED, UNSPECIFIED BACK PAIN LATERALITY: ICD-10-CM

## 2025-01-28 DIAGNOSIS — Z72.0 TOBACCO ABUSE: ICD-10-CM

## 2025-01-28 DIAGNOSIS — Z23 ENCOUNTER FOR IMMUNIZATION: ICD-10-CM

## 2025-01-28 DIAGNOSIS — J45.30 MILD PERSISTENT ASTHMA WITHOUT COMPLICATION: ICD-10-CM

## 2025-01-28 DIAGNOSIS — G89.29 CHRONIC LOW BACK PAIN WITH SCIATICA, SCIATICA LATERALITY UNSPECIFIED, UNSPECIFIED BACK PAIN LATERALITY: ICD-10-CM

## 2025-01-28 DIAGNOSIS — J43.9 PULMONARY EMPHYSEMA, UNSPECIFIED EMPHYSEMA TYPE (HCC): ICD-10-CM

## 2025-01-28 DIAGNOSIS — Z78.0 ASYMPTOMATIC POSTMENOPAUSAL STATE: ICD-10-CM

## 2025-01-28 DIAGNOSIS — E78.5 HYPERLIPIDEMIA, UNSPECIFIED HYPERLIPIDEMIA TYPE: Primary | ICD-10-CM

## 2025-01-28 DIAGNOSIS — R03.0 ELEVATED BLOOD-PRESSURE READING, WITHOUT DIAGNOSIS OF HYPERTENSION: ICD-10-CM

## 2025-01-28 DIAGNOSIS — E03.9 ACQUIRED HYPOTHYROIDISM: ICD-10-CM

## 2025-01-28 PROCEDURE — G8428 CUR MEDS NOT DOCUMENT: HCPCS | Performed by: STUDENT IN AN ORGANIZED HEALTH CARE EDUCATION/TRAINING PROGRAM

## 2025-01-28 PROCEDURE — 1126F AMNT PAIN NOTED NONE PRSNT: CPT | Performed by: STUDENT IN AN ORGANIZED HEALTH CARE EDUCATION/TRAINING PROGRAM

## 2025-01-28 PROCEDURE — 3017F COLORECTAL CA SCREEN DOC REV: CPT | Performed by: STUDENT IN AN ORGANIZED HEALTH CARE EDUCATION/TRAINING PROGRAM

## 2025-01-28 PROCEDURE — 90653 IIV ADJUVANT VACCINE IM: CPT | Performed by: STUDENT IN AN ORGANIZED HEALTH CARE EDUCATION/TRAINING PROGRAM

## 2025-01-28 PROCEDURE — 1090F PRES/ABSN URINE INCON ASSESS: CPT | Performed by: STUDENT IN AN ORGANIZED HEALTH CARE EDUCATION/TRAINING PROGRAM

## 2025-01-28 PROCEDURE — 99214 OFFICE O/P EST MOD 30 MIN: CPT | Performed by: STUDENT IN AN ORGANIZED HEALTH CARE EDUCATION/TRAINING PROGRAM

## 2025-01-28 PROCEDURE — G8417 CALC BMI ABV UP PARAM F/U: HCPCS | Performed by: STUDENT IN AN ORGANIZED HEALTH CARE EDUCATION/TRAINING PROGRAM

## 2025-01-28 PROCEDURE — G8399 PT W/DXA RESULTS DOCUMENT: HCPCS | Performed by: STUDENT IN AN ORGANIZED HEALTH CARE EDUCATION/TRAINING PROGRAM

## 2025-01-28 PROCEDURE — 3023F SPIROM DOC REV: CPT | Performed by: STUDENT IN AN ORGANIZED HEALTH CARE EDUCATION/TRAINING PROGRAM

## 2025-01-28 PROCEDURE — G0008 ADMIN INFLUENZA VIRUS VAC: HCPCS | Performed by: STUDENT IN AN ORGANIZED HEALTH CARE EDUCATION/TRAINING PROGRAM

## 2025-01-28 PROCEDURE — 1123F ACP DISCUSS/DSCN MKR DOCD: CPT | Performed by: STUDENT IN AN ORGANIZED HEALTH CARE EDUCATION/TRAINING PROGRAM

## 2025-01-28 PROCEDURE — 4004F PT TOBACCO SCREEN RCVD TLK: CPT | Performed by: STUDENT IN AN ORGANIZED HEALTH CARE EDUCATION/TRAINING PROGRAM

## 2025-01-28 ASSESSMENT — ENCOUNTER SYMPTOMS
VOMITING: 0
ABDOMINAL PAIN: 0
DIARRHEA: 0
CHEST TIGHTNESS: 0
BACK PAIN: 1
BLOOD IN STOOL: 0
RHINORRHEA: 0
WHEEZING: 0
COUGH: 0
SHORTNESS OF BREATH: 0
NAUSEA: 0

## 2025-01-28 ASSESSMENT — PATIENT HEALTH QUESTIONNAIRE - PHQ9
SUM OF ALL RESPONSES TO PHQ9 QUESTIONS 1 & 2: 0
SUM OF ALL RESPONSES TO PHQ QUESTIONS 1-9: 0
2. FEELING DOWN, DEPRESSED OR HOPELESS: NOT AT ALL
1. LITTLE INTEREST OR PLEASURE IN DOING THINGS: NOT AT ALL

## 2025-01-28 NOTE — PROGRESS NOTES
Jenifer He is a 69 y.o. year old female who presents today for   Chief Complaint   Patient presents with    Follow-up     PT presents for follow up visit.         Is someone accompanying this pt? No    Is the patient using any DME equipment during OV? No    Depression Screenin/28/2025    11:27 AM 10/31/2024     2:47 PM 2024    10:08 AM 2024     9:40 AM 2023    10:40 AM 10/25/2022    11:08 AM 2022    11:01 AM   PHQ-9 Questionaire   Little interest or pleasure in doing things 0 0 0 0 0 0 0   Feeling down, depressed, or hopeless 0 0 0 0 0 0 0   PHQ-9 Total Score 0 0 0 0 0 0 0       Abuse Screenin/23/2023    10:00 AM   AMB Abuse Screening   Do you ever feel afraid of your partner? N   Are you in a relationship with someone who physically or mentally threatens you? N   Is it safe for you to go home? Y       Learning Assessment:  No question data found.    Fall Risk:      2025    11:27 AM 10/31/2024     2:47 PM 2024    10:09 AM 2024     9:40 AM 2023    10:25 AM 2023    10:39 AM   Fall Risk   Do you feel unsteady or are you worried about falling?  no no no no no no   2 or more falls in past year? no no no no no no   Fall with injury in past year? no no no no no no           Coordination of Care:   1. \"Have you been to the ER, urgent care clinic since your last visit?  Hospitalized since your last visit?\" NO    2. \"Have you seen or consulted any other health care providers outside of the [facility] since your last visit?\" No    3. For patients aged 45-75: Has the patient had a colonoscopy / FIT/ Cologuard? YES     If the patient is female:    4. For patients aged 40-74: Has the patient had a mammogram within the past 2 years? NO    5. For patients aged 21-65: Has the patient had a pap smear? N/A    Health Maintenance: reviewed and discussed and ordered per Provider.    Health Maintenance Due   Topic Date Due    Pneumococcal 65+ years Vaccine (1 
Thought content normal.         Judgment: Judgment normal.          Hospital Outpatient Visit on 11/13/2024   Component Date Value Ref Range Status    POC Creatinine 11/13/2024 0.7  0.6 - 1.3 MG/DL Final    eGFR, POC 11/13/2024 >90  >60 ml/min/1.73m2 Final    Comment:    Pediatric calculator link: https://www.kidney.org/professionals/kdoqi/gfr_calculatorped     These results are not intended for use in patients <18 years of age.     eGFR results are calculated without a race factor using  the 2021 CKD-EPI equation. Careful clinical correlation is recommended, particularly when comparing to results calculated using previous equations.  The CKD-EPI equation is less accurate in patients with extremes of muscle mass, extra-renal metabolism of creatinine, excessive creatine ingestion, or following therapy that affects renal tubular secretion.         No results found for any visits on 01/28/25.    Patient Care Team:  Patient Care Team:  Regla Hernandez MD as PCP - General  [facility]      Assessment / Plan:     Diagnosis Orders   1. Hyperlipidemia, unspecified hyperlipidemia type  CBC with Auto Differential    Comprehensive Metabolic Panel    Lipid Panel      2. Pulmonary emphysema, unspecified emphysema type (HCC)        3. Asymptomatic postmenopausal state  DEXA BONE DENSITY AXIAL SKELETON      4. Tobacco abuse        5. Acquired hypothyroidism  TSH + Free T4 Panel      6. Chronic low back pain with sciatica, sciatica laterality unspecified, unspecified back pain laterality        7. Mild persistent asthma without complication        8. Elevated blood-pressure reading, without diagnosis of hypertension        9. Encounter for immunization  Influenza, FLUAD Trivalent, (age 65 y+), IM, Preservative Free, 0.5mL            Notes reviewed.     Asthma/COPD: Continue albuterol as needed.    HLD: Counseled on lifestyle changes.  She does not want to start on a statin    Herpes labialis:

## 2025-02-26 NOTE — PROGRESS NOTES
Patient: Jenifer He                MRN: [id number]       SSN: [id number]  YOB: 1955        AGE: 70 y.o.        SEX: female      PCP: Regla Hernandez MD  02/27/25    Chief Complaint   Patient presents with    Hand Pain     left     HISTORY:  Jenifer He is a 70 y.o. female who is seen for increased left thumb triggering, catching, and popping. She has responded to previous left trigger thumb cortisone injections but her left thumb pains have returned. She states her triggering pain is so severe at times that she can barely bend her thumb.     She fell on 2/8/25 while at a basketball game for her grandson. She was seen at [facility] ED the same day where left shoulder, left wrist, and bilateral knee x-rays were negative for anything acute but left elbow x-rays revealed an intra-articular radial head fracture with associated joint effusion. She is being treated by Dr. Ga at [facility] for that injury--treated with a sling.     She was previously seen in 2017 for left hand injury. She reports that she accidentally stabbed her left hand in the thenar eminence on 10/24/17 when she was trying to scoop up a crusty brownie from the edge of a baking pan. The sharp knife slipped and went right into her palm at the base of her left thumb.       She has seen Dr. Thomas in the past for foot problems.     Occupation, etc:  Ms. He is retired. 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  She now runs [employer] in [place] a few nights a week. She walks and rides a bicycle for exercise.  She lives in [place] with her .  Her son owns his own [employer] business in [place]. She also has two daughters who live in the area.  She has 7 grandchildren.  Wt Readings from Last 3 Encounters:   02/27/25 82.6 kg (182 lb)   01/28/25 80.3 kg (177 lb)   11/05/24 78.9 kg (174 lb)      Body mass index is 29.38 kg/m².    Patient Active Problem List

## 2025-02-27 ENCOUNTER — OFFICE VISIT (OUTPATIENT)
Age: 70
End: 2025-02-27

## 2025-02-27 VITALS — WEIGHT: 182 LBS | HEIGHT: 66 IN | TEMPERATURE: 97.8 F | BODY MASS INDEX: 29.25 KG/M2

## 2025-02-27 DIAGNOSIS — M65.80 STENOSING TENOSYNOVITIS: ICD-10-CM

## 2025-02-27 DIAGNOSIS — S52.125A CLOSED NONDISPLACED FRACTURE OF HEAD OF LEFT RADIUS, INITIAL ENCOUNTER: ICD-10-CM

## 2025-02-27 DIAGNOSIS — M65.312 TRIGGER THUMB OF LEFT HAND: ICD-10-CM

## 2025-02-27 DIAGNOSIS — M79.642 HAND PAIN, LEFT: Primary | ICD-10-CM

## 2025-02-27 RX ORDER — BUPIVACAINE HYDROCHLORIDE 5 MG/ML
0.5 INJECTION, SOLUTION PERINEURAL ONCE
Status: COMPLETED | OUTPATIENT
Start: 2025-02-27 | End: 2025-02-27

## 2025-02-27 RX ORDER — BETAMETHASONE SODIUM PHOSPHATE AND BETAMETHASONE ACETATE 3; 3 MG/ML; MG/ML
3 INJECTION, SUSPENSION INTRA-ARTICULAR; INTRALESIONAL; INTRAMUSCULAR; SOFT TISSUE ONCE
Status: COMPLETED | OUTPATIENT
Start: 2025-02-27 | End: 2025-02-27

## 2025-02-27 RX ADMIN — BUPIVACAINE HYDROCHLORIDE 2.5 MG: 5 INJECTION, SOLUTION PERINEURAL at 15:52

## 2025-02-27 RX ADMIN — BETAMETHASONE SODIUM PHOSPHATE AND BETAMETHASONE ACETATE 3 MG: 3; 3 INJECTION, SUSPENSION INTRA-ARTICULAR; INTRALESIONAL; INTRAMUSCULAR; SOFT TISSUE at 15:51

## 2025-06-04 NOTE — PROGRESS NOTES
3/17/16  INTERPRETATION/FINDINGS  Right leg :  1. Deep veins visualized include the common femoral, femoral, popliteal, posterior tibial and peroneal veins.  2. No evidence of deep venous thrombosis detected in the veins visualized.  3. No evidence of deep vein thrombosis in the contralateral common femoral vein.  4. Superficial veins visualized include the great saphenous vein.  5. No evidence of superficial thrombosis detected.  6. There is a large  (3.5 x 3.1 cm) avascular collection in the right popliteal fossa c/w a Bakers Cyst.     RADIOGRAPHS:   2/8/25 XR LT KNEE 3V [facility] ED  No acute fracture or subluxation.      2/8/25 XR RT KNEE 3V [facility] ED  No acute fracture or subluxation.      2/8/25 XR LT SHOULDER 4V [facility] ED  No acute fracture or subluxation.      2/8/25 XR LT WRIST 4V [facility] ED  1. No acute fracture or subluxation.     2. Moderate degeneration at the first carpometacarpal joint.      2/8/25 XR LT ELBOW 4V [facility] ED  1. Intra-articular radial head fracture with associated joint effusion.      10/17/24  3 VIEWS LT HAND ISAAC  Three views of left hand: no fractures, no joint space narrowing.     10/17/24  3 VIEWS LT KNEE ISAAC  Three views of left knee: no fractures, no effusion, Kellgren Ehsan grade 2 with moderate joint space narrowing, ++ osteophytes present.     XR RIGHT KNEE 3/17/16  IMPRESSION:  No fractures, no effusion, mild joint space narrowing, osteophytes present, femoral condyle flattening, left osteochondroma present.    PROCEDURE: Left thumb A1 pulley injected 0.5 cc 0.25% Marcaine and 0.5 cc (3 mg) Celestone.  Chart reviewed for the following:   King HALLMAN MD, have reviewed the History, Physical and updated the Allergic reactions for Jenifer L Self     TIME OUT performed immediately prior to start of procedure:  King HALLMAN MD, have performed the following reviews on Jenifer L Self prior to

## 2025-06-06 ENCOUNTER — OFFICE VISIT (OUTPATIENT)
Age: 70
End: 2025-06-06

## 2025-06-06 VITALS — BODY MASS INDEX: 29.02 KG/M2 | HEIGHT: 66 IN | WEIGHT: 180.6 LBS

## 2025-06-06 DIAGNOSIS — M79.602 LEFT ARM PAIN: ICD-10-CM

## 2025-06-06 DIAGNOSIS — M65.312 TRIGGER THUMB OF LEFT HAND: ICD-10-CM

## 2025-06-06 DIAGNOSIS — R20.0 NUMBNESS AND TINGLING IN LEFT HAND: ICD-10-CM

## 2025-06-06 DIAGNOSIS — M65.80 STENOSING TENOSYNOVITIS: ICD-10-CM

## 2025-06-06 DIAGNOSIS — M79.642 HAND PAIN, LEFT: Primary | ICD-10-CM

## 2025-06-06 DIAGNOSIS — R20.2 NUMBNESS AND TINGLING IN LEFT HAND: ICD-10-CM

## 2025-06-06 RX ORDER — BETAMETHASONE SODIUM PHOSPHATE AND BETAMETHASONE ACETATE 3; 3 MG/ML; MG/ML
3 INJECTION, SUSPENSION INTRA-ARTICULAR; INTRALESIONAL; INTRAMUSCULAR; SOFT TISSUE ONCE
Status: COMPLETED | OUTPATIENT
Start: 2025-06-06 | End: 2025-06-06

## 2025-06-06 RX ORDER — BUPIVACAINE HYDROCHLORIDE 5 MG/ML
0.5 INJECTION, SOLUTION PERINEURAL ONCE
Status: COMPLETED | OUTPATIENT
Start: 2025-06-06 | End: 2025-06-06

## 2025-06-06 RX ADMIN — BUPIVACAINE HYDROCHLORIDE 2.5 MG: 5 INJECTION, SOLUTION PERINEURAL at 09:33

## 2025-06-06 RX ADMIN — BETAMETHASONE SODIUM PHOSPHATE AND BETAMETHASONE ACETATE 3 MG: 3; 3 INJECTION, SUSPENSION INTRA-ARTICULAR; INTRALESIONAL; INTRAMUSCULAR; SOFT TISSUE at 09:32

## 2025-06-26 ENCOUNTER — HOSPITAL ENCOUNTER (OUTPATIENT)
Facility: HOSPITAL | Age: 70
Discharge: HOME | End: 2025-06-29
Attending: STUDENT IN AN ORGANIZED HEALTH CARE EDUCATION/TRAINING PROGRAM
Payer: MEDICARE

## 2025-06-26 VITALS — BODY MASS INDEX: 29.16 KG/M2 | HEIGHT: 66 IN

## 2025-06-26 DIAGNOSIS — Z78.0 ASYMPTOMATIC POSTMENOPAUSAL STATE: ICD-10-CM

## 2025-06-26 DIAGNOSIS — Z12.31 VISIT FOR SCREENING MAMMOGRAM: ICD-10-CM

## 2025-06-26 PROCEDURE — 77063 BREAST TOMOSYNTHESIS BI: CPT

## 2025-06-26 PROCEDURE — 77080 DXA BONE DENSITY AXIAL: CPT

## 2025-07-30 ENCOUNTER — PROCEDURE VISIT (OUTPATIENT)
Age: 70
End: 2025-07-30
Payer: MEDICARE

## 2025-07-30 VITALS
WEIGHT: 180.2 LBS | HEIGHT: 66 IN | HEART RATE: 65 BPM | TEMPERATURE: 96.7 F | DIASTOLIC BLOOD PRESSURE: 64 MMHG | SYSTOLIC BLOOD PRESSURE: 113 MMHG | BODY MASS INDEX: 28.96 KG/M2 | OXYGEN SATURATION: 96 %

## 2025-07-30 DIAGNOSIS — R20.2 NUMBNESS AND TINGLING IN LEFT HAND: Primary | ICD-10-CM

## 2025-07-30 DIAGNOSIS — R20.0 NUMBNESS AND TINGLING IN LEFT HAND: Primary | ICD-10-CM

## 2025-07-30 PROCEDURE — 95886 MUSC TEST DONE W/N TEST COMP: CPT | Performed by: PHYSICAL MEDICINE & REHABILITATION

## 2025-07-30 PROCEDURE — 95909 NRV CNDJ TST 5-6 STUDIES: CPT | Performed by: PHYSICAL MEDICINE & REHABILITATION

## 2025-07-30 NOTE — PROGRESS NOTES
[facility]  [facility] [facility], [address]  [place] [address]  Phone: [phone]  Fax: [phone]    Jenifer He  : 1955  PCP: Regla Hernandez MD  2025    ELECTROMYOGRAPHY AND NERVE CONDUCTION STUDIES    Jenifer He was referred by King Baer MD for electrodiagnostic evaluation of numbness/tingling of left hand.    NCV & EMG Findings:  All nerve conduction studies (as indicated in the following tables) were within normal limits.    All examined muscles (as indicated in the following table) showed no evidence of electrical instability     INTERPRETATION  This was a normal nerve conduction and EMG study showing there to be no signs of neuropathy, myopathy, or radiculopathy in the nerves and muscles tested.         CLINICAL INTERPRETATION  The electrodiagnostic findings do not appear to explain her left hand symptoms.       HISTORY OF PRESENT ILLNESS  Jenifer He is a 70 y.o. female.    Pt presents today with LUE EMG evaluation for numbness/tingling of left hand.    PAST MEDICAL HISTORY   Past Medical History:   Diagnosis Date    Asthma     childhood diagnosis; occasional inhaler use    Chest pain     Diverticulitis     Dyslipidemia     \"borderline\" per pt.    Endometriosis     GERD (gastroesophageal reflux disease)     Hypothyroidism     Restless leg syndrome     Thyroid disease     Tobacco use disorder     ongoing       Past Surgical History:   Procedure Laterality Date    FOOT/TOES SURGERY PROC UNLISTED      HYSTERECTOMY (CERVIX STATUS UNKNOWN)      HYSTEROSCOPY      OVARIAN CYST REMOVAL      teenager    TUBAL LIGATION      30 years ago       MEDICATIONS    Current Outpatient Medications   Medication Sig Dispense Refill    albuterol sulfate HFA (PROVENTIL;VENTOLIN;PROAIR) 108 (90 Base) MCG/ACT inhaler Inhale 2 puffs into the lungs every 4 hours as needed for Shortness of Breath or Wheezing 18 g 2    fluticasone (FLONASE) 50 MCG/ACT nasal spray

## 2025-08-20 ENCOUNTER — HOSPITAL ENCOUNTER (OUTPATIENT)
Facility: HOSPITAL | Age: 70
Setting detail: SPECIMEN
Discharge: HOME | End: 2025-08-23
Payer: MEDICARE

## 2025-08-20 DIAGNOSIS — E03.9 ACQUIRED HYPOTHYROIDISM: ICD-10-CM

## 2025-08-20 DIAGNOSIS — E78.5 HYPERLIPIDEMIA, UNSPECIFIED HYPERLIPIDEMIA TYPE: ICD-10-CM

## 2025-08-20 LAB
ALBUMIN SERPL-MCNC: 3.9 G/DL (ref 3.4–5)
ALBUMIN/GLOB SERPL: 1.5 (ref 0.8–1.7)
ALP SERPL-CCNC: 65 U/L (ref 45–117)
ALT SERPL-CCNC: 9 U/L (ref 10–35)
ANION GAP SERPL CALC-SCNC: 12 MMOL/L (ref 3–18)
APPEARANCE UR: CLEAR
AST SERPL-CCNC: 10 U/L (ref 10–38)
BACTERIA URNS QL MICRO: ABNORMAL /HPF
BASOPHILS # BLD: 0.08 K/UL (ref 0–0.1)
BASOPHILS NFR BLD: 1.1 % (ref 0–2)
BILIRUB SERPL-MCNC: 0.4 MG/DL (ref 0.2–1)
BILIRUB UR QL: NEGATIVE
BUN SERPL-MCNC: 15 MG/DL (ref 6–23)
BUN/CREAT SERPL: 20 (ref 12–20)
CALCIUM SERPL-MCNC: 9.7 MG/DL (ref 8.5–10.1)
CHLORIDE SERPL-SCNC: 103 MMOL/L (ref 98–107)
CHOLEST SERPL-MCNC: 214 MG/DL
CO2 SERPL-SCNC: 24 MMOL/L (ref 21–32)
COLOR UR: YELLOW
CREAT SERPL-MCNC: 0.74 MG/DL (ref 0.6–1.3)
DIFFERENTIAL METHOD BLD: ABNORMAL
EOSINOPHIL # BLD: 0.19 K/UL (ref 0–0.4)
EOSINOPHIL NFR BLD: 2.6 % (ref 0–5)
EPITH CASTS URNS QL MICRO: ABNORMAL /LPF (ref 0–5)
ERYTHROCYTE [DISTWIDTH] IN BLOOD BY AUTOMATED COUNT: 12.3 % (ref 11.6–14.5)
GLOBULIN SER CALC-MCNC: 2.6 G/DL (ref 2–4)
GLUCOSE SERPL-MCNC: 90 MG/DL (ref 74–108)
GLUCOSE UR STRIP.AUTO-MCNC: NEGATIVE MG/DL
HCT VFR BLD AUTO: 42.1 % (ref 35–45)
HDLC SERPL-MCNC: 53 MG/DL (ref 40–60)
HDLC SERPL: 4.1 (ref 0–5)
HGB BLD-MCNC: 13.7 G/DL (ref 12–16)
HGB UR QL STRIP: ABNORMAL
IMM GRANULOCYTES # BLD AUTO: 0.02 K/UL (ref 0–0.04)
IMM GRANULOCYTES NFR BLD AUTO: 0.3 % (ref 0–0.5)
KETONES UR QL STRIP.AUTO: NEGATIVE MG/DL
LDLC SERPL CALC-MCNC: 144 MG/DL (ref 0–100)
LEUKOCYTE ESTERASE UR QL STRIP.AUTO: ABNORMAL
LIPASE SERPL-CCNC: 43 U/L (ref 13–75)
LYMPHOCYTES # BLD: 2.92 K/UL (ref 0.9–3.6)
LYMPHOCYTES NFR BLD: 39.2 % (ref 21–52)
MCH RBC QN AUTO: 31.6 PG (ref 24–34)
MCHC RBC AUTO-ENTMCNC: 32.5 G/DL (ref 31–37)
MCV RBC AUTO: 97.2 FL (ref 78–100)
MONOCYTES # BLD: 0.5 K/UL (ref 0.05–1.2)
MONOCYTES NFR BLD: 6.7 % (ref 3–10)
NEUTS SEG # BLD: 3.74 K/UL (ref 1.8–8)
NEUTS SEG NFR BLD: 50.1 % (ref 40–73)
NITRITE UR QL STRIP.AUTO: NEGATIVE
NRBC # BLD: 0 K/UL (ref 0–0.01)
NRBC BLD-RTO: 0 PER 100 WBC
PH UR STRIP: 6 (ref 5–8)
PLATELET # BLD AUTO: 278 K/UL (ref 135–420)
PMV BLD AUTO: 8.9 FL (ref 9.2–11.8)
POTASSIUM SERPL-SCNC: 4 MMOL/L (ref 3.5–5.5)
PROT SERPL-MCNC: 6.5 G/DL (ref 6.4–8.2)
PROT UR STRIP-MCNC: NEGATIVE MG/DL
RBC # BLD AUTO: 4.33 M/UL (ref 4.2–5.3)
RBC #/AREA URNS HPF: ABNORMAL /HPF (ref 0–5)
SODIUM SERPL-SCNC: 139 MMOL/L (ref 136–145)
SP GR UR REFRACTOMETRY: 1.01 (ref 1–1.03)
T4 FREE SERPL-MCNC: 1.5 NG/DL (ref 0.9–1.7)
TRIGL SERPL-MCNC: 88 MG/DL (ref 0–150)
TSH, 3RD GENERATION: 6.59 UIU/ML (ref 0.27–4.2)
UROBILINOGEN UR QL STRIP.AUTO: 0.2 EU/DL (ref 0.2–1)
VLDLC SERPL CALC-MCNC: 18 MG/DL
WBC # BLD AUTO: 7.5 K/UL (ref 4.6–13.2)
WBC URNS QL MICRO: ABNORMAL /HPF (ref 0–5)

## 2025-08-20 PROCEDURE — 84443 ASSAY THYROID STIM HORMONE: CPT

## 2025-08-20 PROCEDURE — 80061 LIPID PANEL: CPT

## 2025-08-20 PROCEDURE — 36415 COLL VENOUS BLD VENIPUNCTURE: CPT

## 2025-08-20 PROCEDURE — 85025 COMPLETE CBC W/AUTO DIFF WBC: CPT

## 2025-08-20 PROCEDURE — 84439 ASSAY OF FREE THYROXINE: CPT

## 2025-08-20 PROCEDURE — 80053 COMPREHEN METABOLIC PANEL: CPT

## 2025-08-20 PROCEDURE — 83690 ASSAY OF LIPASE: CPT

## 2025-08-20 PROCEDURE — 81001 URINALYSIS AUTO W/SCOPE: CPT

## 2025-08-26 ENCOUNTER — OFFICE VISIT (OUTPATIENT)
Facility: CLINIC | Age: 70
End: 2025-08-26

## 2025-08-26 VITALS
SYSTOLIC BLOOD PRESSURE: 135 MMHG | BODY MASS INDEX: 28.73 KG/M2 | HEIGHT: 66 IN | OXYGEN SATURATION: 97 % | DIASTOLIC BLOOD PRESSURE: 73 MMHG | HEART RATE: 69 BPM | WEIGHT: 178.8 LBS

## 2025-08-26 DIAGNOSIS — M85.851 OSTEOPENIA OF NECKS OF BOTH FEMURS: ICD-10-CM

## 2025-08-26 DIAGNOSIS — G89.29 CHRONIC LOW BACK PAIN WITH SCIATICA, SCIATICA LATERALITY UNSPECIFIED, UNSPECIFIED BACK PAIN LATERALITY: ICD-10-CM

## 2025-08-26 DIAGNOSIS — H91.93 DECREASED HEARING OF BOTH EARS: ICD-10-CM

## 2025-08-26 DIAGNOSIS — Z72.0 TOBACCO ABUSE: ICD-10-CM

## 2025-08-26 DIAGNOSIS — J43.9 PULMONARY EMPHYSEMA, UNSPECIFIED EMPHYSEMA TYPE (HCC): ICD-10-CM

## 2025-08-26 DIAGNOSIS — E78.5 HYPERLIPIDEMIA, UNSPECIFIED HYPERLIPIDEMIA TYPE: ICD-10-CM

## 2025-08-26 DIAGNOSIS — J30.89 NON-SEASONAL ALLERGIC RHINITIS, UNSPECIFIED TRIGGER: ICD-10-CM

## 2025-08-26 DIAGNOSIS — M85.852 OSTEOPENIA OF NECKS OF BOTH FEMURS: ICD-10-CM

## 2025-08-26 DIAGNOSIS — R03.0 ELEVATED BLOOD-PRESSURE READING, WITHOUT DIAGNOSIS OF HYPERTENSION: ICD-10-CM

## 2025-08-26 DIAGNOSIS — M54.40 CHRONIC LOW BACK PAIN WITH SCIATICA, SCIATICA LATERALITY UNSPECIFIED, UNSPECIFIED BACK PAIN LATERALITY: ICD-10-CM

## 2025-08-26 DIAGNOSIS — E03.9 ACQUIRED HYPOTHYROIDISM: ICD-10-CM

## 2025-08-26 DIAGNOSIS — Z00.00 MEDICARE ANNUAL WELLNESS VISIT, SUBSEQUENT: Primary | ICD-10-CM

## 2025-08-26 DIAGNOSIS — J45.30 MILD PERSISTENT ASTHMA WITHOUT COMPLICATION: ICD-10-CM

## 2025-08-26 SDOH — ECONOMIC STABILITY: FOOD INSECURITY: WITHIN THE PAST 12 MONTHS, THE FOOD YOU BOUGHT JUST DIDN'T LAST AND YOU DIDN'T HAVE MONEY TO GET MORE.: NEVER TRUE

## 2025-08-26 SDOH — ECONOMIC STABILITY: FOOD INSECURITY: WITHIN THE PAST 12 MONTHS, YOU WORRIED THAT YOUR FOOD WOULD RUN OUT BEFORE YOU GOT MONEY TO BUY MORE.: NEVER TRUE

## 2025-08-26 ASSESSMENT — LIFESTYLE VARIABLES
HOW OFTEN DO YOU HAVE A DRINK CONTAINING ALCOHOL: MONTHLY OR LESS
HOW MANY STANDARD DRINKS CONTAINING ALCOHOL DO YOU HAVE ON A TYPICAL DAY: 1 OR 2

## 2025-08-26 ASSESSMENT — PATIENT HEALTH QUESTIONNAIRE - PHQ9
SUM OF ALL RESPONSES TO PHQ QUESTIONS 1-9: 0
SUM OF ALL RESPONSES TO PHQ QUESTIONS 1-9: 0
2. FEELING DOWN, DEPRESSED OR HOPELESS: NOT AT ALL
SUM OF ALL RESPONSES TO PHQ QUESTIONS 1-9: 0
SUM OF ALL RESPONSES TO PHQ QUESTIONS 1-9: 0
1. LITTLE INTEREST OR PLEASURE IN DOING THINGS: NOT AT ALL

## 2025-08-26 ASSESSMENT — ENCOUNTER SYMPTOMS
CHEST TIGHTNESS: 0
RHINORRHEA: 0
VOMITING: 0
BLOOD IN STOOL: 0
DIARRHEA: 0
SHORTNESS OF BREATH: 0
NAUSEA: 0
ABDOMINAL PAIN: 0
WHEEZING: 0
BACK PAIN: 1
COUGH: 0